# Patient Record
Sex: MALE | Race: WHITE | Employment: FULL TIME | URBAN - METROPOLITAN AREA
[De-identification: names, ages, dates, MRNs, and addresses within clinical notes are randomized per-mention and may not be internally consistent; named-entity substitution may affect disease eponyms.]

---

## 2017-02-09 ENCOUNTER — TRANSFERRED RECORDS (OUTPATIENT)
Dept: HEALTH INFORMATION MANAGEMENT | Facility: CLINIC | Age: 27
End: 2017-02-09

## 2017-02-13 ENCOUNTER — TRANSFERRED RECORDS (OUTPATIENT)
Dept: HEALTH INFORMATION MANAGEMENT | Facility: CLINIC | Age: 27
End: 2017-02-13

## 2017-05-05 ENCOUNTER — TRANSFERRED RECORDS (OUTPATIENT)
Dept: HEALTH INFORMATION MANAGEMENT | Facility: CLINIC | Age: 27
End: 2017-05-05

## 2017-05-08 ENCOUNTER — THERAPY VISIT (OUTPATIENT)
Dept: PHYSICAL THERAPY | Facility: CLINIC | Age: 27
End: 2017-05-08
Payer: COMMERCIAL

## 2017-05-08 DIAGNOSIS — M54.6 PAIN IN THORACIC SPINE: Primary | ICD-10-CM

## 2017-05-08 DIAGNOSIS — G89.29 CHRONIC BILATERAL LOW BACK PAIN WITHOUT SCIATICA: ICD-10-CM

## 2017-05-08 DIAGNOSIS — M54.50 CHRONIC BILATERAL LOW BACK PAIN WITHOUT SCIATICA: ICD-10-CM

## 2017-05-08 PROCEDURE — 97110 THERAPEUTIC EXERCISES: CPT | Mod: GP | Performed by: PHYSICAL THERAPIST

## 2017-05-08 PROCEDURE — 97161 PT EVAL LOW COMPLEX 20 MIN: CPT | Mod: GP | Performed by: PHYSICAL THERAPIST

## 2017-05-08 NOTE — PROGRESS NOTES
Physical Therapy Initial Evaluation  May 8, 2017     Precautions/Restrictions/MD instructions: PT eval and treat. 2x/wk for 12 weeks, stretching and strengthening, US, ergonomics, back school, send back to physician for follow-up when therapy is completed. When pt done with therapy, fax Oswestry scores for first and last visits.     Subjective:   Date of Onset: Chronic since age 11  Primary Symptoms/Location of Pain: Mid back pain as well as bilateral LBP. Quality of pain is dull, aching and stabbing. Pains are described as constant in nature. Pain is worse: constant. Pain is rated 3/10.   History of symptoms: Pains began gradually as the result of insidious onset when he was a child - possibly diagnosed with scoliosis.   Worsened by: Worse as day goes on, but also first thing in the morning. Bending forward makes it worse. Lifting objects.  Alleviated by: Nothing.    General health as reported by patient: good  Pertinent medical/surgical history: refer to health history. Imaging: MRI/discogram (T6,7,8 DDD, herniated nucleus pulposus). 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 Patient's goals are: decrease pain. Return to MD:  When done with therapy.     Therapist Impression:   Hong Santiago is a 26 year old male with chronic history of back pain. He presents with signs and symptoms consistent with mechanical thoracic and lumbar back pain. These impairments limit his ability to bend forward and lift objects for work. Skilled PT services are necessary in order to reduce impairments and improve independent function.    Objective:  LUMBAR EXAMINATION    Posture: Poor/fair in sitting  Baseline mid and LBP  Active ROM Limitation   Flexion     Juan? Min loss, Incr mid back pain  Positive   Extension Min loss, no effect    L R   Sideglide + on R low back -     Movement Loss Percy Mod Min Nil Pain   Flexion   X  Incr midback pain   Extension  X   Incr midback pain   Rotation R    X -   Rotation L    X -    Other          (* = patient s pain)  Myotomal Strength (MMT) L R   Resisted Hip Flexion (L2) 5 5   Resisted Knee Ext (L3) 5 5   Resisted Ankle DF (L4) 5 5   Resisted Great Toe Ext (L5) 5 5   Resisted Peroneals (S1) 5 5   Resisted Knee Flexion (S1-2) 5 5     Sensory/Reflex Tests: light touch sensation symmetrical for bilateral LEs     Dural Signs:   L R   Slump - -     Assessment/Plan:    The patient is a 26 year old male with chief complaint of mid and low back pain.    The patient has the following significant findings with corresponding treatment plan.  Diagnosis 1:  Thoracic pain, HNP  Pain -  hot/cold therapy, US, electric stimulation, mechanical traction, manual therapy, splint/taping/bracing/orthotics, self management, education, directional preference exercise and home program  Decreased ROM/flexibility - manual therapy, therapeutic exercise and home program  Decreased joint mobility - manual therapy and therapeutic exercise  Decreased strength - therapeutic exercise, therapeutic activities and home program  Impaired muscle performance - neuro re-education  Decreased function - therapeutic activities  Impaired posture - neuro re-education    Diagnosis 2:  Mechanical LBP     Pain -  hot/cold therapy, US, electric stimulation, mechanical traction, manual therapy, splint/taping/bracing/orthotics, self management, education, directional preference exercise and home program  Decreased ROM/flexibility - manual therapy, therapeutic exercise and home program  Decreased joint mobility - manual therapy and therapeutic exercise  Decreased strength - therapeutic exercise, therapeutic activities and home program  Impaired muscle performance - neuro re-education  Decreased function - therapeutic activities  Impaired posture - neuro re-education    Therapy Evaluation Codes:   1) History comprised of:   Personal factors that impact the plan of care:      Overall behavior pattern, Past/current experiences and Time since  onset of symptoms.    Comorbidity factors that impact the plan of care are:      None.     Medications impacting care: Pain.  2) Examination of Body Systems comprised of:   Body structures and functions that impact the plan of care:      Lumbar spine and Thoracic Spine.   Activity limitations that impact the plan of care are:      Bending and lifting.   Clinical presentation characteristics are:    Stable/Uncomplicated.  3) Presentation comprised of:   Presentation scored as Low complexity with uncomplicated characteristics..  4) Decision-Making    Low complexity using standardized patient assessment instrument and/or measureable assessment of functional outcome.  Cumulative Therapy Evaluation is: Low complexity.    Previous and current functional limitations:  (See Goal Flow Sheet for this information)    Short term and Long term goals: (See Goal Flow Sheet for this information)     Communication ability:  Patient appears to be able to clearly communicate and understand verbal and written communication and follow directions correctly.  Treatment Explanation - The following has been discussed with the patient: RX ordered/plan of care, anticipated outcomes, and possible risks and side effects.  This patient would benefit from PT intervention to resume normal activities.   Rehab potential is poor given comorbidities listed above as well as failure to improve with prior treatment and chronicity of symptoms.    Frequency:  2 X week, once daily  Duration:  for 3 weeks  Discharge Plan: Achieve all LTGs, be independent in home treatment program, and reach maximal therapeutic benefit.    Please refer to the daily flowsheet for treatment today, total treatment time and time spent performing 1:1 timed codes.

## 2017-05-08 NOTE — PROGRESS NOTES
Subjective:    Patient is a 26 year old male presenting with rehab left ankle/foot hpi.                                      Pertinent medical history includes:  Depression.    Other surgeries include:  Orthopedic surgery (R foot).  Current medications:  Sleep medication, pain medication and anti-depressants.  Current occupation is [employer].    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                                Objective:    System    Physical Exam    General     ROS    Assessment/Plan:

## 2017-05-08 NOTE — LETTER
[facility] PHYSICAL THERAPY  [address]  [place] [address]  [phone]    May 8, 2017  Re: Hong DIOP Pamela   :   1990  MRN:  [id number]   REFERRING PHYSICIAN:   Bobo Lopez    [facility] PHYSICAL [facility]  Date of Initial Evaluation:  17  Visits: 1 Rxs Used: 1  Reason for Referral:     Pain in thoracic spine  Chronic bilateral low back pain without sciatica  Physical Therapy Initial Evaluation  May 8, 2017  Precautions/Restrictions/MD instructions: PT eval and treat. 2x/wk for 12 weeks, stretching and strengthening, US, ergonomics, back school, send back to physician for follow-up when therapy is completed. When pt done with therapy, fax Oswestry scores for first and last visits.     Subjective:   Date of Onset: Chronic since age 11  Primary Symptoms/Location of Pain: Mid back pain as well as bilateral LBP. Quality of pain is dull, aching and stabbing. Pains are described as constant in nature. Pain is worse: constant. Pain is rated 3/10.   History of symptoms: Pains began gradually as the result of insidious onset when he was a child - possibly diagnosed with scoliosis.   Worsened by: Worse as day goes on, but also first thing in the morning. Bending forward makes it worse. Lifting objects.  Alleviated by: Nothing.    General health as reported by patient: good  Pertinent medical/surgical history: Depression. Other surgeries include: Orthopedic surgery (R foot). Current medications: Sleep medication, pain medication and anti-depressants. Current occupation is [employer]. 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  Imaging: MRI/discogram (T6,7,8 DDD, herniated nucleus pulposus). 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 Patient's goals are: decrease pain. Return to MD:  When done with therapy.     Re: Hong DIOP  Pamela   :   1990    Therapist Impression:   Hong Santiago is a 26 year old male with chronic history of back pain. He presents with signs and symptoms consistent with mechanical thoracic and lumbar back pain. These impairments limit his ability to bend forward and lift objects for work. Skilled PT services are necessary in order to reduce impairments and improve independent function.    Objective:  LUMBAR EXAMINATION    Posture: Poor/fair in sitting  Baseline mid and LBP  Active ROM Limitation   Flexion     Juan? Min loss, Incr mid back pain  Positive   Extension Min loss, no effect    L R   Sideglide + on R low back -     Movement Loss Percy Mod Min Nil Pain   Flexion   X  Incr midback pain   Extension  X   Incr midback pain   Rotation R    X -   Rotation L    X -   Other          (* = patient s pain)  Myotomal Strength (MMT) L R   Resisted Hip Flexion (L2) 5 5   Resisted Knee Ext (L3) 5 5   Resisted Ankle DF (L4) 5 5   Resisted Great Toe Ext (L5) 5 5   Resisted Peroneals (S1) 5 5   Resisted Knee Flexion (S1-2) 5 5     Sensory/Reflex Tests: light touch sensation symmetrical for bilateral LEs     Dural Signs:   L R   Slump - -     Assessment/Plan:    The patient is a 26 year old male with chief complaint of mid and low back pain.    The patient has the following significant findings with corresponding treatment plan.  Diagnosis 1:  Thoracic pain, HNP    Re: Hong Santiago   :   1990    Pain -  hot/cold therapy, US, electric stimulation, mechanical traction, manual therapy, splint/taping/bracing/orthotics, self management, education, directional preference exercise and home program  Decreased ROM/flexibility - manual therapy, therapeutic exercise and home program  Decreased joint mobility - manual therapy and therapeutic exercise  Decreased strength - therapeutic exercise, therapeutic activities and home program  Impaired muscle performance - neuro re-education  Decreased function - therapeutic  activities  Impaired posture - neuro re-education  Diagnosis 2:  Mechanical LBP     Pain -  hot/cold therapy, US, electric stimulation, mechanical traction, manual therapy, splint/taping/bracing/orthotics, self management, education, directional preference exercise and home program  Decreased ROM/flexibility - manual therapy, therapeutic exercise and home program  Decreased joint mobility - manual therapy and therapeutic exercise  Decreased strength - therapeutic exercise, therapeutic activities and home program  Impaired muscle performance - neuro re-education  Decreased function - therapeutic activities  Impaired posture - neuro re-education    Therapy Evaluation Codes:   1) History comprised of:   Personal factors that impact the plan of care:      Overall behavior pattern, Past/current experiences and Time since onset of symptoms.    Comorbidity factors that impact the plan of care are:      None.     Medications impacting care: Pain.  2) Examination of Body Systems comprised of:   Body structures and functions that impact the plan of care:      Lumbar spine and Thoracic Spine.   Activity limitations that impact the plan of care are:      Bending and lifting.   Clinical presentation characteristics are:    Stable/Uncomplicated.  3) Presentation comprised of:   Presentation scored as Low complexity with uncomplicated characteristics..  4) Decision-Making    Low complexity using standardized patient assessment instrument and/or measureable assessment of functional outcome.  Cumulative Therapy Evaluation is: Low complexity.  Previous and current functional limitations:  (See Goal Flow Sheet for this information)    Short term and Long term goals: (See Goal Flow Sheet for this information)   Communication ability:  Patient appears to be able to clearly communicate and understand verbal and written communication and follow directions correctly.  Treatment Explanation - The following has been discussed with the  patient: RX ordered/plan of care, anticipated outcomes, and possible risks and side effects.  Re: Hong Santiago   :   1990    This patient would benefit from PT intervention to resume normal activities.   Rehab potential is poor given comorbidities listed above as well as failure to improve with prior treatment and chronicity of symptoms.  Frequency:  2 X week, once daily  Duration:  for 3 weeks  Discharge Plan: Achieve all LTGs, be independent in home treatment program, and reach maximal therapeutic benefit.      Thank you for your referral.    INQUIRIES  Therapist: Flakito Loyola DPT  [facility]  [address] [place] [address] [id number]  [facility] [address]  Phone: [phone]  Fax: [phone]

## 2017-05-12 ENCOUNTER — THERAPY VISIT (OUTPATIENT)
Dept: PHYSICAL THERAPY | Facility: CLINIC | Age: 27
End: 2017-05-12
Payer: COMMERCIAL

## 2017-05-12 DIAGNOSIS — M54.50 CHRONIC BILATERAL LOW BACK PAIN WITHOUT SCIATICA: ICD-10-CM

## 2017-05-12 DIAGNOSIS — G89.29 CHRONIC BILATERAL LOW BACK PAIN WITHOUT SCIATICA: ICD-10-CM

## 2017-05-12 DIAGNOSIS — M54.6 PAIN IN THORACIC SPINE: ICD-10-CM

## 2017-05-12 PROCEDURE — 97110 THERAPEUTIC EXERCISES: CPT | Mod: GP | Performed by: PHYSICAL THERAPIST

## 2017-05-12 PROCEDURE — 97112 NEUROMUSCULAR REEDUCATION: CPT | Mod: GP | Performed by: PHYSICAL THERAPIST

## 2017-05-12 PROCEDURE — 97140 MANUAL THERAPY 1/> REGIONS: CPT | Mod: GP | Performed by: PHYSICAL THERAPIST

## 2017-05-16 ENCOUNTER — THERAPY VISIT (OUTPATIENT)
Dept: PHYSICAL THERAPY | Facility: CLINIC | Age: 27
End: 2017-05-16
Payer: COMMERCIAL

## 2017-05-16 DIAGNOSIS — G89.29 CHRONIC BILATERAL LOW BACK PAIN WITHOUT SCIATICA: ICD-10-CM

## 2017-05-16 DIAGNOSIS — M54.50 CHRONIC BILATERAL LOW BACK PAIN WITHOUT SCIATICA: ICD-10-CM

## 2017-05-16 DIAGNOSIS — M54.6 PAIN IN THORACIC SPINE: ICD-10-CM

## 2017-05-16 PROCEDURE — 97530 THERAPEUTIC ACTIVITIES: CPT | Mod: GP | Performed by: PHYSICAL THERAPIST

## 2017-05-16 PROCEDURE — 97140 MANUAL THERAPY 1/> REGIONS: CPT | Mod: GP | Performed by: PHYSICAL THERAPIST

## 2017-05-24 ENCOUNTER — TELEPHONE (OUTPATIENT)
Dept: PALLIATIVE MEDICINE | Facility: CLINIC | Age: 27
End: 2017-05-24

## 2017-05-24 ENCOUNTER — OFFICE VISIT (OUTPATIENT)
Dept: NEUROSURGERY | Facility: CLINIC | Age: 27
End: 2017-05-24
Attending: NURSE PRACTITIONER
Payer: COMMERCIAL

## 2017-05-24 VITALS
HEART RATE: 97 BPM | BODY MASS INDEX: 24.62 KG/M2 | TEMPERATURE: 97.6 F | DIASTOLIC BLOOD PRESSURE: 73 MMHG | OXYGEN SATURATION: 97 % | SYSTOLIC BLOOD PRESSURE: 136 MMHG | WEIGHT: 186.6 LBS

## 2017-05-24 DIAGNOSIS — M54.9 SPINE PAIN, MULTILEVEL: Primary | ICD-10-CM

## 2017-05-24 PROCEDURE — 99205 OFFICE O/P NEW HI 60 MIN: CPT | Performed by: NURSE PRACTITIONER

## 2017-05-24 PROCEDURE — 99211 OFF/OP EST MAY X REQ PHY/QHP: CPT | Performed by: NURSE PRACTITIONER

## 2017-05-24 RX ORDER — DIAZEPAM 5 MG
5 TABLET ORAL EVERY 12 HOURS PRN
Qty: 40 TABLET | Refills: 0 | Status: SHIPPED | OUTPATIENT
Start: 2017-05-24 | End: 2018-04-23

## 2017-05-24 NOTE — PATIENT INSTRUCTIONS
1. Recommend 2nd opinions.  Possible options:    - Dr. Prince Pineda at [facility]    - Dr. Gracia at the [facility]    Both neurosurgeons.       2.  Pain clinic referral. Someone will contact to schedule    3. No surgery indicated at this time per Dr. Nito Esquivel

## 2017-05-24 NOTE — LETTER
May 24, 2017    Hong Santiago  [address] [address]    Dear Hong                                                                 Welcome to the [facility] at the [facility]. We are located on the 6th floor, [address], of the [facility] located at [address] [place] [address]. For general parking, the [address] is the closest to our building.     Your appointment at the [facility] has been scheduled on June 26th at 3:00pm with Laura Thurman NP.    At your first visit, you will meet your team of caregivers who will help you to develop pain management strategies that will last a lifetime. You will meet with our support staff to validate parking at a reduced rate, review your insurance information, and collect your co-payment if required by your insurance company. You will also meet with a medical pain specialist and care coordinator who will assess your pain and develop a plan of care for your successful pain rehabilitation. You should expect to spend 1-2 hours at your first visit with us. Usually, patients work with us for a period of 6-12 months, and eventually return to their primary doctor once their pain management has stabilized.      To help us make your visit go as smoothly as possible, please bring the following items with you on your visit:   Completed Pain Questionnaire enclosed in this packet.  If you do not bring the completed questionnaire, we may have to reschedule your appointment.  List of any medicines that you are currently taking or have been prescribed  Important NON-[place] medical information such as medical records or tests results (X-rays, or laboratory tests)  Your health insurance card  Financial resources to cover your co-payment or balance due at the time of service (cash, personal check, Visa, and MasterCard are acceptable methods of payment)      Due to the demand for new patient evaluations, you must notify the scheduling department 48 hours in advance if you are not able to keep this appointment.  Failure to do so could affect your ability to reschedule with our clinic. Please do not assume that you will  receive any prescription medications at your first visit.    Please call [phone] with any questions regarding your appointment.  We look forward to meeting you and working to address your health care needs.       Sincerely,    [facility]

## 2017-05-24 NOTE — PROGRESS NOTES
Dr. Nito Esquivel  [facility]  Neurosurgery Clinic Visit        CC: spine pain     Primary care Provider: Yasmine Bonilla      Reason For Visit:   I was asked by Dr. Bonilla to consult on the patient for spine pain.      HPI: Hong Santiago is a 26 year old male with spine pain.  He states that he has been treated at the [facility] pain clinic in the past for his pain. He also has had a discogram. He states that his pain began about 16 years ago in his SI joint area.  He states that it started in the pelvic area.  He states that the pain progressed up to his lumbar, thoracic and neck pain.  He states that he has cervical and lumbar radicular pain. He has seen Dr. Falk who was recommend a SI joint fusion and OLIF procedure. He was told that he would walk the next day and feel so much better.  He has had multiple injections.  He states that he is unable to sleep. 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      Pain at its worst 10  Pain right now:  8    Past Medical History:   Diagnosis Date     Anxiety        Past Medical History reviewed with patient during visit.    Past Surgical History:   Procedure Laterality Date     ORTHOPEDIC SURGERY       Past Surgical History reviewed with patient during visit.    Current Outpatient Prescriptions   Medication     Amphetamine-Dextroamphetamine (ADDERALL PO)     Sertraline HCl (ZOLOFT PO)     cloNIDine (CATAPRES) 0.1 MG tablet     HYDROcodone-acetaminophen 5-325 MG per tablet     CLONAZEPAM PO     No current facility-administered medications for this visit.        No Known Allergies    Social History     Social History     Marital status: Single     Spouse name: N/A     Number of children: N/A     Years of education: N/A     Social History Main Topics     Smoking status: Not on file     Smokeless tobacco: Not on file     Alcohol use Yes     Drug use: No     Sexual activity: Not on file     Other Topics Concern     Not on file     Social  History Narrative     No narrative on file       No family history on file.      Review Of Systems  Skin: negative  Eyes: negative  Ears/Nose/Throat: negative  Respiratory: No shortness of breath, dyspnea on exertion, cough, or hemoptysis  Cardiovascular: negative  Gastrointestinal: negative  Genitourinary: negative  Musculoskeletal: back pain and neck pain  Neurologic: negative  Psychiatric: negative  Hematologic/Lymphatic/Immunologic: negative  Endocrine: negative     ROS: 10 point ROS neg other than the symptoms noted above in the HPI.      Vital Signs: /73 (BP Location: Right arm, Patient Position: Chair, Cuff Size: Adult Large)  Pulse 97  Temp 97.6  F (36.4  C) (Oral)  Wt 186 lb 9.6 oz (84.6 kg)  SpO2 97%  BMI 24.62 kg/m2    Examination:  Constitutional:  Alert, well nourished, NAD.  HEENT: Normocephalic, atraumatic.   Pulm:  Without shortness of breath   CV:  No pitting edema of BLE.    Neurological:  Awake  Alert  Oriented x 3  Speech clear  Cranial nerves II - XII intact  PERRL  EOMI  Face symmetric  Tongue midline  Motor exam   Shoulder Abduction:  Right:  5/5   Left:  5/5  Biceps:                      Right:  5/5   Left:  5/5  Triceps:                     Right:  5/5   Left:  5/5  Wrist Extensors:       Right:  5/5   Left:  5/5  Wrist Flexors:           Right:  5/5   Left:  5/5  Intrinsics:                   Right:  5/5   Left:  5/5   Hip Flexor:                Right: 5/5  Left:  5/5  Hip Adductor:             Right:  5/5  Left:  5/5  Hip Abductor:             Right:  5/5  Left:  5/5  Gastroc Soleus:        Right:  5/5  Left:  5/5  Tib/Ant:                      Right:  5/5  Left:  5/5  EHL:                          Right:  5/5  Left:  5/5   Sensation normal to bilateral upper and lower extremities    Gait: Able to stand from a seated position. Normal non-antalgic, non-myelopathic gait.  Able to heel/toe walk without loss of balance      Lumbar examination reveals tenderness of the spine and  paraspinous muscles. Restricted and painful ROM.   Hip height is symmetrical. Negative SI joint pain with palpation today, sciatic notch or greater trochanteric tenderness to palpation bilaterally.  Straight leg raise is negative bilaterally.      Imaging:   Thoracic discogram  medical imaging 2-:    1. Concordance for symptoms of time of injection is suggested.  2.  Dorsal central full thickness annular tear with contrast and gas/air extravasation at T6-7  3.  Right, prior median, posterior, radial tear with full thickness dorsal annular tear T6-7.  4.  Dorsolateral radial tears with small, right central, annular tear at T10-11.  5. Small multilevel Schmorl nodes.  6.  Thoracic scoliosis convex right.   7. No change given technical differences.    MRI lumbar spine 7-:    Mild thoracolumbar Scheuermann's like changes with a mild lumbar curve to the left and:    1.  Mild L1-L2 disc degeneration.  2.  No disc herniation or facet arthropathy, and no stenosis or neural impingement.   3.  No distal cord lesion, and no neoplasm, fracture or infection.     MRI thoracic spine 7-:      1. Mild to moderate multilevel thoracic disc degeneration with mild thoracic scoliosis.    2.  2-3 mm right posterolateral disc herniation at T7-8 without cord impingement.  3.  2-3 mm right paracentral disc protrusion at T6-7 with right ventral cord abutment.   4. No cord abnormality, and no neoplasm, fracture or infection.      Assessment/Plan:     Hong Santiago is a 26 year old male with spine pain.  He states that he has been treated at the [facility] pain clinic in the past for his pain. He also has had a discogram. He states that his pain began about 16 years ago in his SI joint area.  He states that it started in the pelvic area.  He states that the pain progressed up to his lumbar, thoracic and neck pain.  He states that he has cervical and lumbar radicular pain. He has seen Dr. Falk who was recommend a SI joint  fusion and OLIF procedure to his thoracic spine. He was told that he would walk the next day and feel so much better.  He has had multiple injections.  He states that he is unable to sleep. 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  The pt is here with his parents. He is very tearful during the visit. The pt was also seen by Dr. Nito Esquivel.  His scans including the discogram were reviewed in detail. It was explained to the pt that we would not recommend any surgery at this time. The pt and his parents were very upset and feel that nothing is helping. It was explained that he may want to second other opinions before proceeding with any type of surgery. We will also refer him to the pain clinic for comprehensive care. They agreed to the plan of care.  Greater than 50% of this 60 minute visit was spent in face to face evaluation with the patient and family.      Patient Instructions   1. Recommend 2nd opinions.  Possible options:    - Dr. Prince Pineda at [facility]    - Dr. Gracia at the  of     Both neurosurgeons.       2.  Pain clinic referral. Someone will contact to schedule    3. No surgery indicated at this time per Dr. Nito Gustafson [facility]  [facility]  [address] [address]    Tel [phone]  Pager [phone]  1.

## 2017-05-24 NOTE — TELEPHONE ENCOUNTER
[facility] Referral      1. Confirmed address with patient? Yes  2. Confirmed phone number with patient? Yes  3. Confirmed referring provider? Yes  4. Is the PCP the same as the referring provider? No  5. Has the patient been to any previous pain clinics? Yes [facility] and [facility]   (If yes, send GUCCI with welcome letter)  6. Which insurance are we to bill for this appointment?  [employer]    7. Informed pt of cancellation (48 hour) policy? Yes    REGARDING OPIOID MEDICATIONS: We will always address appropriateness of opioid pain medications, but we generally will not automatically take on a prescribing role. When we do take on prescribing of opioids for chronic pain, it is in collaboration with the referring physician for an intermediate period of time (months), with an expectation that the primary physician or provider will assume the prescribing role if medications are effective at stable doses with demonstrated compliance. Therefore, please do not assume that your prescribing responsibilities end on the day of pain clinic consultation.  7. Informed pt of prescribing policy? Yes      8. Referring Provider: Yasmine Bonilla

## 2017-05-24 NOTE — NURSING NOTE
"Hong Santiago is a 26 year old male who presents for:  Chief Complaint   Patient presents with     Neurologic Problem     New pt second opinion. Referred by [facility];thoracic and cervical pain>8        Initial Vitals:  /73 (BP Location: Right arm, Patient Position: Chair, Cuff Size: Adult Large)  Pulse 97  Temp 97.6  F (36.4  C) (Oral)  Wt 186 lb 9.6 oz (84.6 kg)  SpO2 97%  BMI 24.62 kg/m2 Estimated body mass index is 24.62 kg/(m^2) as calculated from the following:    Height as of 6/24/12: 6' 1\" (1.854 m).    Weight as of this encounter: 186 lb 9.6 oz (84.6 kg).. Body surface area is 2.09 meters squared. BP completed using cuff size: large  Data Unavailable    Do you feel safe in your environment?  Yes  Do you need any refills today? No    Nursing Comments: New pt second opinion. Referred by [facility];thoracic and cervical pain.  Patient rates 8 pain today as 5/24/17      5 min. nursing intake time  Aby Diamond CMA      Discharge plan: See NP dictation  2 min. nursing discharge time  Aby Diamond CMA         "

## 2017-05-24 NOTE — MR AVS SNAPSHOT
After Visit Summary   5/24/2017    Hong Santiago    MRN: [id number]           Patient Information     Date Of Birth          1990        Visit Information        Provider Department      5/24/2017 1:40 PM Vy Gustafson APRN CNP [facility]        Today's Diagnoses     Spine pain, multilevel    -  1      Care Instructions    1. Recommend 2nd opinions.  Possible options:    - Dr. Prince Pineda at [facility]    - Dr. Gracia at the [facility]    Both neurosurgeons.       2.  Pain clinic referral. Someone will contact to schedule    3. No surgery indicated at this time per Dr. Nito Esquivel           Follow-ups after your visit        Additional Services     PAIN MANAGEMENT CENTER ([place]) REFERRAL       Your provider has referred you to the [facility].    Reason for Referral: Comprehensive Evaluation and Management    Please complete the following questions:    What is your diagnosis for the patient's pain? Spine pain     Do you have any specific questions for the pain specialist? No    Are there any red flags that may impact the assessment or management of the patient? None    **ANY DIAGNOSTIC TESTS THAT ARE NOT IN EPIC SHOULD BE SENT TO THE PAIN CENTER**    Please note the Pre-Op Pain Consult must be scheduled 2-3 weeks prior to the patient's surgery.  Patient's trying to schedule within 2 weeks of surgery may not be accommodated.     Pre-Op Pain Consults are only good for 30 days.    REGARDING OPIOID MEDICATIONS:  We will always address appropriateness of opioid pain medications, but we generally will not automatically take on a prescribing role. When we do take on prescribing of opioids for chronic pain, it is in collaboration with the referring physician for an intermediate period of time (months), with an expectation that the primary physician or provider will assume the prescribing role if medications are effective at stable doses with  "demonstrated compliance.  Therefore, please do not assume that your prescribing responsibilities end on the day of pain clinic consultation.  Is this agreeable to you? YES    For any questions, contact the [facility] at [phone].    Please be aware that coverage of these services is subject to the terms and limitations of your health insurance plan.  Call member services at your health plan with any benefit or coverage questions.      Please bring the following with you to your appointment:    (1) Any X-Rays, CTs or MRIs which have been performed.  Contact the facility where they were done to arrange for  prior to your scheduled appointment.    (2) List of current medications   (3) This referral request   (4) Any documents/labs given to you for this referral                  Who to contact     If you have questions or need follow up information about today's clinic visit or your schedule please contact [facility] directly at [phone].  Normal or non-critical lab and imaging results will be communicated to you by MyChart, letter or phone within 4 business days after the clinic has received the results. If you do not hear from us within 7 days, please contact the clinic through [employer] or phone. If you have a critical or abnormal lab result, we will notify you by phone as soon as possible.  Submit refill requests through [employer] or call your pharmacy and they will forward the refill request to us. Please allow 3 business days for your refill to be completed.          Additional Information About Your Visit        [employer] Information     [employer] lets you send messages to your doctor, view your test results, renew your prescriptions, schedule appointments and more. To sign up, go to [email or web address] . Click on \"Log in\" on the left side of the screen, which will take you to the Welcome page. Then click on \"Sign up Now\" on the right side of the page. "     You will be asked to enter the access code listed below, as well as some personal information. Please follow the directions to create your username and password.     Your access code is: [email or web address]  Expires: 2017  2:58 PM     Your access code will  in 90 days. If you need help or a new code, please call your [facility] clinic or [phone].        Care EveryWhere ID     This is your Care EveryWhere ID. This could be used by other organizations to access your [facility] medical records  [id number]        Your Vitals Were     Pulse Temperature Pulse Oximetry BMI (Body Mass Index)          97 97.6  F (36.4  C) (Oral) 97% 24.62 kg/m2         Blood Pressure from Last 3 Encounters:   17 136/73   06/10/13 100/53   12 120/80    Weight from Last 3 Encounters:   17 186 lb 9.6 oz (84.6 kg)   06/10/13 190 lb (86.2 kg)   12 195 lb (88.5 kg)              We Performed the Following     PAIN MANAGEMENT CENTER ([place]) REFERRAL        Primary Care Provider Office Phone # Fax #    Yasmine Bonilla [phone]       [facility] [id number] [place] [id number]        Thank you!     Thank you for choosing [facility]  for your care. Our goal is always to provide you with excellent care. Hearing back from our patients is one way we can continue to improve our services. Please take a few minutes to complete the written survey that you may receive in the mail after your visit with us. Thank you!             Your Updated Medication List - Protect others around you: Learn how to safely use, store and throw away your medicines at www.disposemymeds.org.          This list is accurate as of: 17  2:58 PM.  Always use your most recent med list.                   Brand Name Dispense Instructions for use    ADDERALL PO      Take 20 mg by mouth 2 times daily.       CLONAZEPAM PO      Take 0.5 mg by mouth 3 times daily.       cloNIDine 0.1 MG  tablet    CATAPRES    15 tablet    Take 1 tablet by mouth 3 times daily as needed. Opiate withdrawal       HYDROcodone-acetaminophen 5-325 MG per tablet    NORCO    15 tablet    Take 1-2 tablets by mouth every 4 hours as needed for pain.       ZOLOFT PO      Take 100 mg by mouth daily.

## 2017-06-26 ENCOUNTER — OFFICE VISIT (OUTPATIENT)
Dept: PALLIATIVE MEDICINE | Facility: CLINIC | Age: 27
End: 2017-06-26
Payer: COMMERCIAL

## 2017-06-26 VITALS
BODY MASS INDEX: 25.07 KG/M2 | SYSTOLIC BLOOD PRESSURE: 122 MMHG | DIASTOLIC BLOOD PRESSURE: 77 MMHG | HEART RATE: 85 BPM | WEIGHT: 190 LBS

## 2017-06-26 DIAGNOSIS — G89.29 CHRONIC BILATERAL LOW BACK PAIN WITHOUT SCIATICA: ICD-10-CM

## 2017-06-26 DIAGNOSIS — M54.50 CHRONIC BILATERAL LOW BACK PAIN WITHOUT SCIATICA: ICD-10-CM

## 2017-06-26 DIAGNOSIS — M54.6 PAIN IN THORACIC SPINE: Primary | ICD-10-CM

## 2017-06-26 PROCEDURE — 99244 OFF/OP CNSLTJ NEW/EST MOD 40: CPT | Performed by: NURSE PRACTITIONER

## 2017-06-26 ASSESSMENT — PAIN SCALES - GENERAL: PAINLEVEL: MODERATE PAIN (5)

## 2017-06-26 NOTE — MR AVS SNAPSHOT
After Visit Summary   6/26/2017    oHng Santiago    MRN: [id number]           Patient Information     Date Of Birth          1990        Visit Information        Provider Department      6/26/2017 3:00 PM Laura Thurman APRN CNP [facility]        Care Instructions    After Visit Instructions:     Thank you for coming to [facility] for your care. It is my goal to partner with you to help you reach your optimal state of health.     I am recommending multidisciplinary care at this time.  The focus of care will be to continue gradual rehabilitation and pain management with medication adjustments as needed.    Continue daily self-care, identifying contributing factors, and monitoring variations in pain level. Continue to integrate self-care into your life.      1. Schedule follow-up with MOISES [place], NPELDA in 2-3 weeks to discuss treatment options. Schedule 60 minutes    MOISES Davenport, NP-C  [facility]    Contact information: [facility]    Please call if any side effects, questions, or concerns arise.    Nurse Triage line:  [phone]   Call this number with any questions or concerns. You may leave a detailed message anytime. Calls are typically returned Monday through Friday between 8 AM and 4:30 PM. We usually get back to you within 2 business days depending on the issue/request.    Scheduling number: [phone].  Call this number to schedule or change appointments.          Medication Refills Policy:    For non-narcotic medications, please your pharmacy directly to request a refill and the pharmacy will call the [facility] for authorization. Please allow 3-4 days for these refills.    For narcotic refills, call the nurse triage line and leave a message requesting your refill along with the name of the pharmacy that you use. Narcotic prescriptions will be  mailed to your pharmacy or you may pick them up at the clinic.  Please call 7-10 days before your refill is due  The above policy allows adequate time so that you do not run out of medication.    No Show - Late Cancellation - Late Arrival Policy  We believe regular attendance is key to your success in our program.    Any time you are unable to keep your appointment we ask that you call us at  least 24 hours in advance to let us know. This will allow us to offer the appointment time to another patient. The following is our policy for missed appointments. This also applies to appointments cancelled with less than 24 hours notice.    You will receive a letter after missing your 1st and 2nd appointments without contacting the clinic before your scheduled appointment time.     After missing 3 appointments without calling first, we will cancel all of your future appointments at [facility].    At that point, you will not be able to resume services unless approved by your care team  We understand that unforseen circumstances arise, however, out of respect for all concerned and to provide this appointment to another patient, this policy will be enforced.    Please note that most follow up appointments are 30 minutes long. If you arrive late, your provider may not be able to see you for the entire 30 minutes. Please also note that if you arrive more than 15 minutes for any appointment, it may be rescheduled.                                   Follow-ups after your visit        Who to contact     If you have questions or need follow up information about today's clinic visit or your schedule please contact [facility] directly at [phone].  Normal or non-critical lab and imaging results will be communicated to you by MyChart, letter or phone within 4 business days after the clinic has received the results. If you do not hear from us within 7 days, please contact the clinic through  "[employer] or phone. If you have a critical or abnormal lab result, we will notify you by phone as soon as possible.  Submit refill requests through [employer] or call your pharmacy and they will forward the refill request to us. Please allow 3 business days for your refill to be completed.          Additional Information About Your Visit        [employer] Information     [employer] lets you send messages to your doctor, view your test results, renew your prescriptions, schedule appointments and more. To sign up, go to [email or web address] . Click on \"Log in\" on the left side of the screen, which will take you to the Welcome page. Then click on \"Sign up Now\" on the right side of the page.     You will be asked to enter the access code listed below, as well as some personal information. Please follow the directions to create your username and password.     Your access code is: [email or web address]  Expires: 2017  2:58 PM     Your access code will  in 90 days. If you need help or a new code, please call your [facility] clinic or [phone].        Care EveryWhere ID     This is your Care EveryWhere ID. This could be used by other organizations to access your [place] [facility] medical records  [id number]        Your Vitals Were     Pulse BMI (Body Mass Index)                85 25.07 kg/m2           Blood Pressure from Last 3 Encounters:   17 122/77   17 136/73   06/10/13 100/53    Weight from Last 3 Encounters:   17 86.2 kg (190 lb)   17 84.6 kg (186 lb 9.6 oz)   06/10/13 86.2 kg (190 lb)              Today, you had the following     No orders found for display       Primary Care Provider Office Phone # Fax #    Yasmine Bonilla [phone]       [place] [address] [facility] [address]        Equal Access to Services     [facility] DELTA : Daniel Owens, ramone bowman, neo ortega . So [facility] " [phone].    ATENCIÓN: Si ramirez braden, tiene a santiago disposición servicios gratuitos de asistencia lingüística. Cris lewis [phone].    We comply with applicable federal civil rights laws and [place] laws. We do not discriminate on the basis of race, color, national origin, age, disability sex, sexual orientation or gender identity.            Thank you!     Thank you for choosing [facility]  for your care. Our goal is always to provide you with excellent care. Hearing back from our patients is one way we can continue to improve our services. Please take a few minutes to complete the written survey that you may receive in the mail after your visit with us. Thank you!             Your Updated Medication List - Protect others around you: Learn how to safely use, store and throw away your medicines at www.disposemymeds.org.          This list is accurate as of: 6/26/17  4:06 PM.  Always use your most recent med list.                   Brand Name Dispense Instructions for use Diagnosis    ADDERALL PO      Take 20 mg by mouth 2 times daily.        CLONAZEPAM PO      Take 0.5 mg by mouth 3 times daily.        diazepam 5 MG tablet    VALIUM    40 tablet    Take 1 tablet (5 mg) by mouth every 12 hours as needed for muscle spasms or pain    Spine pain, multilevel       ZOLOFT PO      Take 100 mg by mouth daily.

## 2017-06-26 NOTE — PATIENT INSTRUCTIONS
After Visit Instructions:     Thank you for coming to [facility] for your care. It is my goal to partner with you to help you reach your optimal state of health.     I am recommending multidisciplinary care at this time.  The focus of care will be to continue gradual rehabilitation and pain management with medication adjustments as needed.    Continue daily self-care, identifying contributing factors, and monitoring variations in pain level. Continue to integrate self-care into your life.      1. Schedule follow-up with MOISES [facility] NP-C in 2-3 weeks to discuss treatment options. Schedule 60 minutes    MOISES Davenport, NP-C  [facility]  [facility]    Contact information: [facility]    Please call if any side effects, questions, or concerns arise.    Nurse Triage line:  [phone]   Call this number with any questions or concerns. You may leave a detailed message anytime. Calls are typically returned Monday through Friday between 8 AM and 4:30 PM. We usually get back to you within 2 business days depending on the issue/request.    Scheduling number: [phone].  Call this number to schedule or change appointments.          Medication Refills Policy:    For non-narcotic medications, please your pharmacy directly to request a refill and the pharmacy will call the Pain Management Center for authorization. Please allow 3-4 days for these refills.    For narcotic refills, call the nurse triage line and leave a message requesting your refill along with the name of the pharmacy that you use. Narcotic prescriptions will be mailed to your pharmacy or you may pick them up at the clinic.  Please call 7-10 days before your refill is due  The above policy allows adequate time so that you do not run out of medication.    No Show - Late Cancellation - Late Arrival Policy  We believe regular attendance is key to your success in our program.     Any time you are unable to keep your appointment we ask that you call us at  least 24 hours in advance to let us know. This will allow us to offer the appointment time to another patient. The following is our policy for missed appointments. This also applies to appointments cancelled with less than 24 hours notice.    You will receive a letter after missing your 1st and 2nd appointments without contacting the clinic before your scheduled appointment time.     After missing 3 appointments without calling first, we will cancel all of your future appointments at [facility].    At that point, you will not be able to resume services unless approved by your care team  We understand that unforseen circumstances arise, however, out of respect for all concerned and to provide this appointment to another patient, this policy will be enforced.    Please note that most follow up appointments are 30 minutes long. If you arrive late, your provider may not be able to see you for the entire 30 minutes. Please also note that if you arrive more than 15 minutes for any appointment, it may be rescheduled.

## 2017-06-26 NOTE — NURSING NOTE
"No chief complaint on file.      Initial /77  Pulse 85  Wt 86.2 kg (190 lb)  BMI 25.07 kg/m2 Estimated body mass index is 25.07 kg/(m^2) as calculated from the following:    Height as of 6/24/12: 1.854 m (6' 1\").    Weight as of this encounter: 86.2 kg (190 lb).  Medication Reconciliation: complete    "

## 2017-06-26 NOTE — PROGRESS NOTES
"Hong Santiago is a 26 year old male     This patient is being seen at the request of his [facility] medical providers Dr. Esquivel and Vy Gustafson NP-C, for evaluation of his pain issues and recommendations for management, with specific emphasis on back and neck pain    Primary Care Provider is Yasmine Bonilla    The current pain medications are being prescribed by multiple providers    Please see the [facility] health questionnaire which the patient completed and reviewed with me in detail    CHIEF COMPLAINT:  \"To get relief from chronic pain\"    HISTORY OF PRESENT ILLNESS:  Hong Santiago is a 26 year old male with history of low back pain and upper back pain , SI joints, hips    Pain Information:   Onset/Progression:  Pain started at age 10. No specific injury   Pain quality: Aching and dull     Pain timing: Constant     Pain rating: intensity ranges from 4/10 to 8/10, and averages 6/10 on a 0-10 scale.   Aggravating factors include: Standing too long in one place   Relieving factors include: Hot water and using inversion table    Past Pain Treatments:    Pain Clinic:   Yes: [facility] in [place]: had consultation, \"felt unprofessional\".  [facility]: Good care,saw Vy Pruitt: Was discharged for marijuana use   PT: Yes 12-15 different places, [place]   Psychologist: No   Relaxation techniques/biofeedback: No   Chiropractor: Yes: Short term help for neck and low back   Acupuncture: No   Pharmacotherapy:     Opioids: Yes      Non-opioids:  Yes    TENs Unit: Yes: [facility]   Injections: Yes: three SI joint injections, H short term,    Self-care:   No     Current Pain Relevant Medications:    Tylenol #3, 6 tabs per day  Clonazepam 2 mg BID  Valium 10 mg at HS  Adderall 20 mg daily  Sertraline 50 mg daily    Previous Pain Relevant Medications: (H--helped; HI--Helped initially; [facility]--Somewhat helpful; [facility]--No help; W--worse; SE--side effects; ?--Unsure if helpful)   NOTE: This medication information taken " from patient's intake form, not medical records.    Opiates: Codeine: NH, Morphine: SE fatigue, Tramadol: NH, Buprenorphine:NH   NSAIDS: Ibuprofen:Nh, Naproxen:NH,     Muscle Relaxants: SOMA:H for sleep, Clonazepam: [facility], Cyclobenzaprine: NH, Methocarbamol: NH   Anti-migraine mediations: none   Anti-depressants: Buprenorphine: NH, Citalopram: NH, Prozac: Reduced libido, Paxil: SE< Trazodone: Bad dreams, dry mouth, Sertraline: Currently taking.    Sleep aids:Xanax:J for sleep. Clonazepam: H for anxiety, Diazepam:H for sleep    Anxiolytics: Xanax: H, Clonazepam: H, currently taking, Diazepam:H   Anti-convulsants: Gabapentin: tired, sedation    Topicals: Lidocaine: NH   Other medications not covered above: Tylenol:NH, Medical Cannabis: certified by [facility]. [facility]    Any illicit drug use: None  EtOH use: twice monthly few drinks,   Caffeine use: one daily  Nicotine use: daily, e-cig  Any use of prescriptions other than how they were prescribed: notes taking extra pain pills at times when pain is not controlled.   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  Current stressors: pain  Safety concerns: denies    FAMILY MEDICAL HISTORY:  Chronic pain: No   Family history of headaches:  No      HEALTH & LIFESTYLE PRACTICES:  Social History     Social History     Marital status: Single     Spouse name: N/A     Number of children: N/A     Years of education: N/A     Social History Main Topics     Smoking status: Not on file     Smokeless tobacco: Not on file     Alcohol use Yes     Drug use: No     Sexual activity: Not on file     Other Topics Concern     Not on file     Social History Narrative     No narrative on file  "      ALLERGIES:  No Known Allergies    MEDICATIONS:  Current Outpatient Prescriptions   Medication Sig Dispense Refill     diazepam (VALIUM) 5 MG tablet Take 1 tablet (5 mg) by mouth every 12 hours as needed for muscle spasms or pain 40 tablet 0     Amphetamine-Dextroamphetamine (ADDERALL PO) Take 20 mg by mouth 2 times daily.       Sertraline HCl (ZOLOFT PO) Take 100 mg by mouth daily.       CLONAZEPAM PO Take 0.5 mg by mouth 3 times daily.       PHQ-9: Patient Score: 5, \"Extremely difficult\"  Oswestry Disability Index: Patient score: 36%    REVIEW OF SYSTEMS:   Constitutional:  Fatigue and Weight Loss  Eyes/Head: Headache from neck pain  Ears/Nose/Throat: Negative  Allergy/Immune: Negative  Skin:Negative  Hematologic/Lymphatic/Immunologic:Negative  Respiratory: Negative  Cardiovascular: Chest pain r/t stress  Gastrointestinal: Abdominal pain, wrapping from back  Endocrine: Negative  Musculoskeletal: Back pain, Neck pain and Stiffness  Urinary:  Negative   Any bowel or bladder incontinence: Denies   Neurologic: Numbness/Tingling: in feet and hands  Psychiatric: Anxiety, Depression, Mood swings and Stress    PHYSICAL EXAM    /77  Pulse 85  Wt 86.2 kg (190 lb)  BMI 25.07 kg/m2     Appearance:     A&O. Patient is appropriate.   Patient is in NAD.   Patient is well groomed and appears stated age.   Patient is not overweight/underweight.     HEENT:   Normocephalic, atraumatic, sclera, conjunctiva and pharynx normal. Pupils are equal, round. Hearing is adequate for exam .  Neck: Supple.  No deformities or adenopathy  Cardiovascular:  Regular rate and rhythm, no murmur. No edema or JVD appreciated. Peripheral pulses are palpable, no edema on bilateral lower extremities.   Pulmonary:  Clear to auscultation bilaterally. No crackles or wheezing.   Abdominal/Pelvic:   Soft, non-tender with good bowel sounds, no masses felt  Skin:  No rashes, erythema, breakdowns, lesions.   Hematologic:  No bruises, petechiae or " ecchymosis.  Musculoskeletal:  Posture upright, shoulders and pelvis are leveled.   Deltoid: R: 5/5 L: 5/5  Biceps: R: 5/5 L: 5/5  Triceps: R: 5/5 L: 5/5  Intrinsic hand:R: 5/5 L: 5/5  Hip flexion: R: 5/5 L: 4/5  Knee ext: R: 5/5 L: 4/5  Knee flex: R: 5/5 L: 4/5  Dorsiflexion: R: 5/5 L: 4/5  Plantarflexion:R: 5/5 L: 4/5    Gait pattern:  Able to walk on the heels and toes with moderate difficulty. Patient has no antalgic gait.     Neurological:   Deep Tendon Reflex exam:   Biceps:     R:  2/4   L: 2/4   Brachioradialis   R:  2/4   L: 2/4:   Triceps:  R:  2/4   L: 2/4   Patella:  R:  2/4   L: 2/4   Achilles:  R:  2/4   L: 2/4    Saddle anesthesia: Denies    Sensory exam:   Light touch: normal bilateral upper and lower extremities    Vibration: normal in LE   No allodynia, dysesthesia, or hyperalgesia.    Cervical spine:   Flex:  30 degrees   Ext: 30 degrees   Rotation to right: 30 degrees   Rotation to left: 30 degrees   Rotation/ext to right: stiff   Rotation/ext to left: stiff   Tenderness in the cervical spine at midline. No   Tenderness in the cervical paraspinal muscles. No  Thoracic spine:    Kyphosis. Yes   Tenderness in the thoracic spine at midline. No   Tenderness in the thoracic paraspinal muscles. No  Lumbar/Sacral spine:   Forward Flexion:  90 degrees   Ext: 30 degrees, painful   Rotation/ext to right: painful    Rotation/ext to left:: pain free   Lordosis. No   Tenderness in the lumbar spine at midline. No   Tenderness in the lumbar paraspinal muscles.No   Straight leg exam:    Right: negative     Left:  negative   Evangelina/Medardo's test:      Right: negative     Left:  negative   Passive internal rotation:     Right: negative     Left: negative    Active external rotation:      Right: negative     Left: negative   Tenderness over piriformis:     Right: positive     Left:  negative   Tenderness over Trochanteric Bursa:     Right: negative     Left: negative       Psychiatric:  mentation appears normal.,  "affect and mood elevated, rapid speech pattern    DIRE Score for ongoing opioid management is calculated as follows:    Diagnosis = 2 pts (slowly progressive; moderate pain/objective findings)    Intractability = 2 pts (most treatments tried; patient not fully engaged/barriers)    Risk        Psych = 1 pt (serious personality dysfunction/mental illness that significantly interferes with care)         Chem Hlth = 2 pts (use of medications to cope with stress; chemical dependency in remission)       Reliability = 2 pts (occasional difficulties with compliance; generally reliable)       Social = 2 pts (reduction in some relationships/life rolls)       (Psych + Chem hlth + Reliability + Social) = 11    Efficacy = 1 pt (poor function; minimal pain relief despite mod/high med dose)    DIRE Score = 12        7-13: likely NOT suitable candidate for long-term opioid analgesia       14-21: may be a suitable candidate for long-term opioid analgesia      Previous Diagnostic Tests:   Imaging Studies:   See scanned imaging reports.     ASSESSMENT:   1.  Thoracic DDD, MIld scoliosis of mid to lower thoracic spine.   2.  Chemical coping style vs chemical dependency  3.  Chronic low back, indeterminate cause, no imaging available.     Hong presents in the company of his father. He has a long standing history of thoracic and lumbar back pain since age 10. No specific injury or inciting event r/t start of pain. He has been through many evaluations over the years and states that he has been to PT \"12-15 different places over the years\". Opiates are the only treatment which provide relief per his report. He was recently evaluated by Nito Esquivel MD through  Neurosurgery dept who did not recommend a surgical repair. He also saw another surgeon, Dr Falk (?) who was willing to operate on his thoracic spine. He is wondering if he should have a third opinion.     Previously seen at [facility]. He brings in several " office notes and procedure/imaging reports which have all been reviewed. There is no imaging r/t lumbar spine however thoracic discogram notes radial tears at T6-7, T 7-8, and T-10-11 as well as Schmoral's Nodes at T 9-10 and T-11-12.     Pertinent [facility] records scanned into EMR. Noted to self medicate with THC and ETOH in office note dated 6/5/15. Overuse of opiate (OxyContin) noted in office note dated 7/29/15. THC noted in UDS in office note dated 4/4/16. Concern for chemical coping vs chemical dependency. Throughout this office visit he repeatedly states that he needs stronger pain medication.     As patient was a bit late to appt and he had extensive records to review, I will have him return in 2-3 weeks for an additional 60 minute appt to to discuss treatment recommendations.   Recommendations will include:    Increased exercise including waking and swimming as we core strengthening    Ice/heat alternating prn   Health psychology: will refer to Dr Jana Kirkpatrick due to current psychology assessments in our clinic booked out to three months.    Do not recommend opiates for long term chronic pain.    Imaging of lumbar spine unless he has recent imaging elsewhere.      PLAN:    Diagnosis reviewed, treatment option addressed, and risk/benefits discussed.  Self-care instructions given.  I am recommending a multidisciplinary treatment plan to help this patient better manage pain.       1. Schedule follow-up with MOISES Morris, NP-C in 2-3 weeks to discuss treatment options. Schedule 60 minutes    TIME SPENT:   A total of 45  minutes was spent on the patient today, greater than 50% of that time was spent on face to face counseling and care coordination regarding diagnoses and treatment options as mentioned above.    I would like to thank Dr. Esquivel and LOI Acosta for allowing me to participate in the management of this patient.     MOISES Davenport, NP-C  [facility]    Chart  documentation done in part with Dragon Voice recognition Software. Although reviewed after completion, some word and grammatical error may remain.

## 2017-07-07 ASSESSMENT — PATIENT HEALTH QUESTIONNAIRE - PHQ9: SUM OF ALL RESPONSES TO PHQ QUESTIONS 1-9: 9

## 2017-07-19 ENCOUNTER — OFFICE VISIT (OUTPATIENT)
Dept: PALLIATIVE MEDICINE | Facility: CLINIC | Age: 27
End: 2017-07-19
Payer: COMMERCIAL

## 2017-07-19 VITALS — SYSTOLIC BLOOD PRESSURE: 128 MMHG | HEART RATE: 79 BPM | DIASTOLIC BLOOD PRESSURE: 73 MMHG | RESPIRATION RATE: 16 BRPM

## 2017-07-19 DIAGNOSIS — G89.29 CHRONIC BILATERAL LOW BACK PAIN WITHOUT SCIATICA: ICD-10-CM

## 2017-07-19 DIAGNOSIS — M54.50 CHRONIC BILATERAL LOW BACK PAIN WITHOUT SCIATICA: ICD-10-CM

## 2017-07-19 DIAGNOSIS — M54.6 PAIN IN THORACIC SPINE: Primary | ICD-10-CM

## 2017-07-19 PROCEDURE — 99214 OFFICE O/P EST MOD 30 MIN: CPT | Performed by: NURSE PRACTITIONER

## 2017-07-19 ASSESSMENT — PAIN SCALES - GENERAL: PAINLEVEL: SEVERE PAIN (6)

## 2017-07-19 NOTE — MR AVS SNAPSHOT
After Visit Summary   7/19/2017    Hong Santiago    MRN: [id number]           Patient Information     Date Of Birth          1990        Visit Information        Provider Department      7/19/2017 2:00 PM Laura Thurman APRN CNP [facility] [place]        Care Instructions    After Visit Instructions:     Thank you for coming to [facility] for your care. It is my goal to partner with you to help you reach your optimal state of health.     I am recommending multidisciplinary care at this time.  The focus of care will be to continue gradual rehabilitation and pain management with medication adjustments as needed.    Continue daily self-care, identifying contributing factors, and monitoring variations in pain level. Continue to integrate self-care into your life.      1. Talk with your psychiatrist about non-benzodiazapine medications for anxiety ie: Buspar, SSRIs.    2. Schedule appt with Dr Jana Kirkpatrick, health psychologist. Contact info provided.   3. Schedule follow-up with MOISES Morris, NP-C in 1 week after discontinuing Diazepam and clonazepam. We will collect a drug screen at that time.   4. Let us know if you need a referral to the [place] pain program.  5. Medication recommendations:   1. Discontinue meds as noted above with the guidance of your psychiatrist.   2. No change to other medications at this time.       MOISES Davenport, NP-C  [facility]    Contact information: [facility]    Please call if any side effects, questions, or concerns arise.    Nurse Triage line:  [phone]   Call this number with any questions or concerns. You may leave a detailed message anytime. Calls are typically returned Monday through Friday between 8 AM and 4:30 PM. We usually get back to you within 2 business days depending on the issue/request.    Scheduling number: [phone].  Call this  number to schedule or change appointments.          Medication Refills Policy:    For non-narcotic medications, please your pharmacy directly to request a refill and the pharmacy will call the Pain Management Center for authorization. Please allow 3-4 days for these refills.    For narcotic refills, call the nurse triage line and leave a message requesting your refill along with the name of the pharmacy that you use. Narcotic prescriptions will be mailed to your pharmacy or you may pick them up at the clinic.  Please call 7-10 days before your refill is due  The above policy allows adequate time so that you do not run out of medication.    No Show - Late Cancellation - Late Arrival Policy  We believe regular attendance is key to your success in our program.    Any time you are unable to keep your appointment we ask that you call us at  least 24 hours in advance to let us know. This will allow us to offer the appointment time to another patient. The following is our policy for missed appointments. This also applies to appointments cancelled with less than 24 hours notice.    You will receive a letter after missing your 1st and 2nd appointments without contacting the clinic before your scheduled appointment time.     After missing 3 appointments without calling first, we will cancel all of your future appointments at [facility].    At that point, you will not be able to resume services unless approved by your care team  We understand that unforseen circumstances arise, however, out of respect for all concerned and to provide this appointment to another patient, this policy will be enforced.    Please note that most follow up appointments are 30 minutes long. If you arrive late, your provider may not be able to see you for the entire 30 minutes. Please also note that if you arrive more than 15 minutes for any appointment, it may be rescheduled.                                     Follow-ups after  "your visit        Who to contact     If you have questions or need follow up information about today's clinic visit or your schedule please contact [facility] directly at [phone].  Normal or non-critical lab and imaging results will be communicated to you by MyChart, letter or phone within 4 business days after the clinic has received the results. If you do not hear from us within 7 days, please contact the clinic through MyChart or phone. If you have a critical or abnormal lab result, we will notify you by phone as soon as possible.  Submit refill requests through [employer] or call your pharmacy and they will forward the refill request to us. Please allow 3 business days for your refill to be completed.          Additional Information About Your Visit        [employer] Information     [employer] lets you send messages to your doctor, view your test results, renew your prescriptions, schedule appointments and more. To sign up, go to [email or web address] . Click on \"Log in\" on the left side of the screen, which will take you to the Welcome page. Then click on \"Sign up Now\" on the right side of the page.     You will be asked to enter the access code listed below, as well as some personal information. Please follow the directions to create your username and password.     Your access code is: [email or web address]  Expires: 2017  2:58 PM     Your access code will  in 90 days. If you need help or a new code, please call your [place] clinic or [phone].        Care EveryWhere ID     This is your Care EveryWhere ID. This could be used by other organizations to access your [place] medical records  [id number]        Your Vitals Were     Pulse Respirations                79 16           Blood Pressure from Last 3 Encounters:   17 128/73   17 122/77   17 136/73    Weight from Last 3 Encounters:   17 86.2 kg (190 lb)   17 84.6 kg (186 lb 9.6 oz)   06/10/13 86.2 kg " (190 lb)              Today, you had the following     No orders found for display       Primary Care Provider    No Pcp Confirmed       No address on file        Equal Access to Services     RACHAELDEANDRE DELTA : Daniel aad ku hadyesenia Owens, wajanda lujudy, misty kainessada fabiola, neo campuzano laDorabre moses. So [facility] [phone].    ATENCIÓN: Si habla español, tiene a santiago disposición servicios gratuitos de asistencia lingüística. Llame al [phone].    We comply with applicable federal civil rights laws and [place] laws. We do not discriminate on the basis of race, color, national origin, age, disability sex, sexual orientation or gender identity.            Thank you!     Thank you for choosing [facility]  for your care. Our goal is always to provide you with excellent care. Hearing back from our patients is one way we can continue to improve our services. Please take a few minutes to complete the written survey that you may receive in the mail after your visit with us. Thank you!             Your Updated Medication List - Protect others around you: Learn how to safely use, store and throw away your medicines at www.disposemymeds.org.          This list is accurate as of: 7/19/17  2:39 PM.  Always use your most recent med list.                   Brand Name Dispense Instructions for use Diagnosis    ADDERALL PO      Take 20 mg by mouth 2 times daily.        CLONAZEPAM PO      Take 0.5 mg by mouth 3 times daily.        diazepam 5 MG tablet    VALIUM    40 tablet    Take 1 tablet (5 mg) by mouth every 12 hours as needed for muscle spasms or pain    Spine pain, multilevel       ZOLOFT PO      Take 100 mg by mouth daily.

## 2017-07-19 NOTE — PROGRESS NOTES
"[facility]    CHIEF COMPLAINT: Pain in back, neck and SI joint pain     INTERVAL HISTORY:  Last seen on 6/26/17     Recommendations/plan at the last visit included:  1.                  Schedule follow-up with MOISES Morris NP-C in 2-3 weeks to discuss treatment options. Schedule 60 minutes    Since his last visit, Hong Santiago reports:  - He and his family have looked into the 3 week inpatient pain program at [facility] and are interested in applying for admission.     Pain Information:   Pain quality: Miserable    Pain timing: Constant     Pain rating: intensity ranges from 3/10 to 7/10, and averages 5/10 on a 0-10 scale.   Aggravating factors include: \"My job\"   Relieving factors include: Hot showers      Current Pain Relevant Medications:    Tylenol #3, 6 tabs per day  Clonazepam 2 mg BID  Valium 10 mg at HS  Adderall 20 mg daily  Sertraline 50 mg daily     Previous Pain Relevant Medications: (H--helped; HI--Helped initially; SWH--Somewhat helpful; NH--No help; W--worse; SE--side effects; ?--Unsure if helpful)   NOTE: This medication information taken from patient's intake form, not medical records.                         Opiates: Codeine: NH, Morphine: SE fatigue, Tramadol: NH, Buprenorphine:NH                        NSAIDS: Ibuprofen:Nh, Naproxen:NH,                         Muscle Relaxants: SOMA:H for sleep, Clonazepam: SWH, Cyclobenzaprine: NH, Methocarbamol: NH                        Anti-migraine mediations: none                        Anti-depressants: Buprenorphine: NH, Citalopram: NH, Prozac: Reduced libido, Paxil: SE< Trazodone: Bad dreams, dry mouth, Sertraline: Currently taking.                         Sleep aids:Xanax:J for sleep. Clonazepam: H for anxiety, Diazepam:H for sleep                         Anxiolytics: Xanax: H, Clonazepam: H, currently taking, Diazepam:H                        Anti-convulsants: Gabapentin: tired, sedation                                         "     Topicals: Lidocaine: NH                        Other medications not covered above: Tylenol:NH, Medical Cannabis: certified by [facility]. [facility]     Any illicit drug use: History of discharge from pain clinic for THC use.   EtOH use: twice monthly few drinks,   Caffeine use: one daily  Nicotine use: daily, e-cig  Any use of prescriptions other than how they were prescribed: notes taking extra pain pills at times when pain is not controlled.       [place] Board of Pharmacy Data Base Reviewed:    YES; Multiple controlled substances    SELF CARE:   How often do you practice SELF-CARE (relaxing, stretching, pacing, monitoring posture, taking mini-breaks) in a typical day:  6+ times daily      Medications:  Current Outpatient Prescriptions   Medication Sig Dispense Refill     diazepam (VALIUM) 5 MG tablet Take 1 tablet (5 mg) by mouth every 12 hours as needed for muscle spasms or pain 40 tablet 0     Amphetamine-Dextroamphetamine (ADDERALL PO) Take 20 mg by mouth 2 times daily.       Sertraline HCl (ZOLOFT PO) Take 100 mg by mouth daily.       CLONAZEPAM PO Take 0.5 mg by mouth 3 times daily.         Review of Systems: A 10-point review of systems was negative, with the exception of chronic pain issues.      Social History: Reviewed; unchanged from initial consultation.      Family history: Reviewed; unchanged from initial consultation.     PHYSICAL EXAM    Vitals:    07/19/17 1413   BP: 128/73   BP Location: Left arm   Patient Position: Chair   Cuff Size: Adult Small   Pulse: 79   Resp: 16       Constitutional: healthy, alert and no distress  HEENT: Head atraumatic, normocephalic. Eyes without conjunctival injection or jaundice. Neck supple. No obvious neck masses.  Skin: No rash, lesions, or petechiae of exposed skin.   Extremities: Peripheral pulses intact. No clubbing, cyanosis, or edema.   Psychiatric/mental status: Alert, without lethargy or stupor. Appropriate affect. Mood normal.     Neurologic  exam:  CN:  Cranial nerves 2-12 are grossly normal.  Motor:  5/5 UE and LE strength    Musculoskeletal exam:  Cervical spine:                       Flex:  30 degrees                       Ext: 30 degrees                       Rotation to right: 30 degrees                       Rotation to left: 30 degrees                       Rotation/ext to right: stiff                       Rotation/ext to left: stiff                       Tenderness in the cervical spine at midline. No                       Tenderness in the cervical paraspinal muscles. No  Thoracic spine:                        Kyphosis. Yes                       Tenderness in the thoracic spine at midline. No                       Tenderness in the thoracic paraspinal muscles. No  Lumbar/Sacral spine:                       Forward Flexion:  90 degrees                       Ext: 30 degrees, painful                       Rotation/ext to right: painful                        Rotation/ext to left:: pain free                       Lordosis. No                       Tenderness in the lumbar spine at midline. No                       Tenderness in the lumbar paraspinal muscles.No                       Straight leg exam:                                            Right: negative                                             Left:  negative                       Evangelina/Medardo's test:                                              Right: negative                                             Left:  negative                       Passive internal rotation:                                            Right: negative                                             Left: negative                        Active external rotation:                                             Right: negative                                             Left: negative                       Tenderness over piriformis:                                            Right: positive                                              Left:  negative                       Tenderness over Trochanteric Bursa:                                            Right: negative                                             Left: negative         Psychiatric:  mentation appears normal., affect and mood elevated, rapid speech pattern     DIRE Score for ongoing opioid management is calculated as follows:    Diagnosis = 2 pts (slowly progressive; moderate pain/objective findings)    Intractability = 2 pts (most treatments tried; patient not fully engaged/barriers)    Risk        Psych = 1 pt (serious personality dysfunction/mental illness that significantly interferes with care)         Chem Hlth = 2 pts (use of medications to cope with stress; chemical dependency in remission)       Reliability = 2 pts (occasional difficulties with compliance; generally reliable)       Social = 2 pts (reduction in some relationships/life rolls)       (Psych + Chem hlth + Reliability + Social) = 11    Efficacy = 1 pt (poor function; minimal pain relief despite mod/high med dose)    DIRE Score = 12        7-13: likely NOT suitable candidate for long-term opioid analgesia       14-21: may be a suitable candidate for long-term opioid analgesia        Previous Diagnostic Tests:   Imaging Studies:   See scanned imaging reports.      ASSESSMENT:   1.  Thoracic DDD, MIld scoliosis of mid to lower thoracic spine.   2.  Chemical coping style vs chemical dependency  3.  Chronic low back, indeterminate cause, no imaging available.     Hong returns to discuss treatment options. As previously noted, he has had differing opinions on whether he should proceed with an SI fusion. He has had multiple injecitons which have not proven to be helpful. Reviewed multidisciplinary pain management approach and he is interested in health psychology. I have provided a referral.     Medications: recommend talking with psychiatrist about non-benzodiazapine medications for anxiety ie: Buspar, SSRIs.   We will consider assuming prescribing of opiates when he is no longer on benzos.     He will be applying to [place] for their inpatient pain management program and will keep us appraised of that situation.     Plan:    Diagnosis reviewed, treatment option addressed, and risk/benifits discussed.  Self-care instructions given.  I am recommending a multidisciplinary treatment plan to help this patient better manage pain.      1. Talk with your psychiatrist about non-benzodiazapine medications for anxiety ie: Buspar, SSRIs.    2. Schedule appt with Dr Jana Kirkpatrick, health psychologist. Contact info provided.   3. Schedule follow-up with MOISES Morris, NP-C in 1 week after discontinuing Diazepam and clonazepam. We will collect a drug screen at that time.   4. Let us know if you need a referral to the [place] pain program.  5. Medication recommendations:   1. Discontinue meds as noted above with the guidance of your psychiatrist.   2. No change to other medications at this time.     Total time spent face to face was 30 minutes and more than 50% of face to face time was spent in counseling and/or coordination of care regarding the diagnosis and recommendations above.      MOISES Davenport, NP-C   [facility]

## 2017-07-19 NOTE — NURSING NOTE
"Chief Complaint   Patient presents with     Pain       Initial /73 (BP Location: Left arm, Patient Position: Chair, Cuff Size: Adult Small)  Pulse 79  Resp 16 Estimated body mass index is 25.07 kg/(m^2) as calculated from the following:    Height as of 6/24/12: 1.854 m (6' 1\").    Weight as of 6/26/17: 86.2 kg (190 lb).  Medication Reconciliation: complete       Jaymie Martinez MA  [facility]      "

## 2017-07-19 NOTE — PATIENT INSTRUCTIONS
After Visit Instructions:     Thank you for coming to [facility] for your care. It is my goal to partner with you to help you reach your optimal state of health.     I am recommending multidisciplinary care at this time.  The focus of care will be to continue gradual rehabilitation and pain management with medication adjustments as needed.    Continue daily self-care, identifying contributing factors, and monitoring variations in pain level. Continue to integrate self-care into your life.      1. Talk with your psychiatrist about non-benzodiazapine medications for anxiety ie: Buspar, SSRIs.    2. Schedule appt with Dr Jnaa Kirkpatrick, health psychologist. Contact info provided.   3. Schedule follow-up with MOISES Morris, NP-C in 1 week after discontinuing Diazepam and clonazepam. We will collect a drug screen at that time.   4. Let us know if you need a referral to the [place] pain program.  5. Medication recommendations:   1. Discontinue meds as noted above with the guidance of your psychiatrist.   2. No change to other medications at this time.       MOISES Davenport, NP-C  [facility]    Contact information: [facility]    Please call if any side effects, questions, or concerns arise.    Nurse Triage line:  [phone]   Call this number with any questions or concerns. You may leave a detailed message anytime. Calls are typically returned Monday through Friday between 8 AM and 4:30 PM. We usually get back to you within 2 business days depending on the issue/request.    Scheduling number: [phone].  Call this number to schedule or change appointments.          Medication Refills Policy:    For non-narcotic medications, please your pharmacy directly to request a refill and the pharmacy will call the Pain Management Center for authorization. Please allow 3-4 days for these refills.    For narcotic refills, call the nurse triage  line and leave a message requesting your refill along with the name of the pharmacy that you use. Narcotic prescriptions will be mailed to your pharmacy or you may pick them up at the clinic.  Please call 7-10 days before your refill is due  The above policy allows adequate time so that you do not run out of medication.    No Show - Late Cancellation - Late Arrival Policy  We believe regular attendance is key to your success in our program.    Any time you are unable to keep your appointment we ask that you call us at  least 24 hours in advance to let us know. This will allow us to offer the appointment time to another patient. The following is our policy for missed appointments. This also applies to appointments cancelled with less than 24 hours notice.    You will receive a letter after missing your 1st and 2nd appointments without contacting the clinic before your scheduled appointment time.     After missing 3 appointments without calling first, we will cancel all of your future appointments at [facility].    At that point, you will not be able to resume services unless approved by your care team  We understand that unforseen circumstances arise, however, out of respect for all concerned and to provide this appointment to another patient, this policy will be enforced.    Please note that most follow up appointments are 30 minutes long. If you arrive late, your provider may not be able to see you for the entire 30 minutes. Please also note that if you arrive more than 15 minutes for any appointment, it may be rescheduled.

## 2017-08-03 ENCOUNTER — TELEPHONE (OUTPATIENT)
Dept: PALLIATIVE MEDICINE | Facility: CLINIC | Age: 27
End: 2017-08-03

## 2017-08-03 NOTE — LETTER
RE: Hong Santiago  [address] [place] [address]      August 31, 2017    To Whom it May Concern:     Hong Santiago has been my patient since 6/26/17 for chronic thoracic and lumbar pain. He has previously been a patient at [facility] and [facility] in [place]. He has previously participated in multiple sessions of physical therapy, had interventional injections and has tried medications as documented in my office notes. These interventions have not proven to provide long lasting pain relief. Therefore I am referring him to the [facility] service to explore additional options for treating his pain.     Thank you,           MOISES Davenport, NP-C  [facility]

## 2017-08-03 NOTE — TELEPHONE ENCOUNTER
Flor (mother) calling, she states that the patient has been trying to get into the [facility] rehab clinic but they ar needing a referral. The [facility] number is [phone], please call that number to start the process. Flor's number is [phone]      Alta JACKMAN    [facility]

## 2017-08-07 NOTE — TELEPHONE ENCOUNTER
Amanda, Please call and find out what [facility] needs specifically so I can write the referral. I recommended their inpatient pain program but I am not sure how they need that referral written.     Thanks, Laura

## 2017-08-08 NOTE — TELEPHONE ENCOUNTER
"Called Physician Referral Center at the [facility]: [phone]. I spoke to [place]. This is not an inpatient program. It is a  3 week day program where the patient retreats to there own lodging at night. There is no overnight \"inpatient\" program at the [facility].    Things to be included in the referral are as follows:  Referral letter  Current med list  List of past pain medications used  Comprehensive medical evaluation such as a physical from PCP  Pain related interventions the patients has tried  Problem list  Faxed to [phone].     "

## 2017-08-21 NOTE — TELEPHONE ENCOUNTER
VM left today at: 1:22 pm    Mother calling in regards to update about [facility] referral. Please advise. Ph. [phone]      Miriam BLANTON    [facility]

## 2017-08-21 NOTE — TELEPHONE ENCOUNTER
Routing to provider to review referral request with information needed below.     Luci Cheng  BSN-RN Care Coordinator  [facility]

## 2017-08-22 NOTE — TELEPHONE ENCOUNTER
The website for this program is:   [email or web address]    Sending to provider as she will need to do a formal referral with the below noted information and then we can fax over to [place].     Frida Hart RN, [facility]  Pain Clinic Care Coordinator

## 2017-08-29 NOTE — TELEPHONE ENCOUNTER
Alo (father) is calling to follow up on a referral from Laura. They are still waiting for this referral,please call to discuss. You can call Alo or Hong ( he works over nights and is not up until afternoon otherwise leave a detailed message).      Alta JACKMAN    [facility]

## 2017-08-30 NOTE — TELEPHONE ENCOUNTER
Patients father Alo is calling again. I spoke to him at length. He is very concerned with Neville current state related to his pain and would like to have this referral expedited.    CALEB CanoN, RN  Care Coordinator  [facility]

## 2017-08-31 NOTE — TELEPHONE ENCOUNTER
Letter written, printed and placed in RN basket with my last two office notes.     MOISES Davenport, NP-C  [facility]

## 2017-09-05 NOTE — TELEPHONE ENCOUNTER
The below information was faxed to the [facility] at [phone]. The patient was notified. He will be contacted by the Clinic after the nurses review the referral packet. They will let him know if he has been accepted and if he has they will mail him the information about the program to review. The patient was notified.    JOHANN Cano, RN  Care Coordinator  [facility]

## 2018-04-23 ENCOUNTER — OFFICE VISIT (OUTPATIENT)
Dept: FAMILY MEDICINE | Facility: CLINIC | Age: 28
End: 2018-04-23

## 2018-04-23 VITALS
BODY MASS INDEX: 24.13 KG/M2 | WEIGHT: 188 LBS | HEIGHT: 74 IN | DIASTOLIC BLOOD PRESSURE: 60 MMHG | OXYGEN SATURATION: 96 % | SYSTOLIC BLOOD PRESSURE: 132 MMHG | HEART RATE: 64 BPM | TEMPERATURE: 97.7 F | RESPIRATION RATE: 20 BRPM

## 2018-04-23 DIAGNOSIS — F41.1 GAD (GENERALIZED ANXIETY DISORDER): ICD-10-CM

## 2018-04-23 DIAGNOSIS — R05.9 COUGH: Primary | ICD-10-CM

## 2018-04-23 DIAGNOSIS — R03.0 ELEVATED BLOOD PRESSURE READING WITHOUT DIAGNOSIS OF HYPERTENSION: ICD-10-CM

## 2018-04-23 PROBLEM — G89.29 CHRONIC BILATERAL LOW BACK PAIN WITHOUT SCIATICA: Status: RESOLVED | Noted: 2017-05-08 | Resolved: 2018-04-23

## 2018-04-23 PROBLEM — M54.50 CHRONIC BILATERAL LOW BACK PAIN WITHOUT SCIATICA: Status: RESOLVED | Noted: 2017-05-08 | Resolved: 2018-04-23

## 2018-04-23 PROBLEM — M54.6 PAIN IN THORACIC SPINE: Status: RESOLVED | Noted: 2017-05-08 | Resolved: 2018-04-23

## 2018-04-23 PROBLEM — M51.34 DEGENERATION OF THORACIC INTERVERTEBRAL DISC: Status: ACTIVE | Noted: 2018-04-23

## 2018-04-23 LAB
% GRANULOCYTES: 56.9 %
HCT VFR BLD AUTO: 47.2 % (ref 40–53)
HEMOGLOBIN: 15.5 G/DL (ref 13.3–17.7)
LYMPHOCYTES NFR BLD AUTO: 33.7 %
MCH RBC QN AUTO: 30.2 PG (ref 26–33)
MCHC RBC AUTO-ENTMCNC: 32.8 G/DL (ref 31–36)
MCV RBC AUTO: 92.1 FL (ref 78–100)
MONOCYTES NFR BLD AUTO: 9.4 %
PLATELET COUNT - QUEST: 240 10^9/L (ref 150–375)
RBC # BLD AUTO: 5.13 10*12/L (ref 4.4–5.9)
WBC # BLD AUTO: 5.7 10*9/L (ref 4–11)

## 2018-04-23 PROCEDURE — 71046 X-RAY EXAM CHEST 2 VIEWS: CPT | Performed by: PHYSICIAN ASSISTANT

## 2018-04-23 PROCEDURE — 99202 OFFICE O/P NEW SF 15 MIN: CPT | Performed by: PHYSICIAN ASSISTANT

## 2018-04-23 PROCEDURE — 85025 COMPLETE CBC W/AUTO DIFF WBC: CPT | Performed by: PHYSICIAN ASSISTANT

## 2018-04-23 PROCEDURE — 36415 COLL VENOUS BLD VENIPUNCTURE: CPT | Performed by: PHYSICIAN ASSISTANT

## 2018-04-23 RX ORDER — ALBUTEROL SULFATE 90 UG/1
2 AEROSOL, METERED RESPIRATORY (INHALATION) EVERY 6 HOURS PRN
Qty: 1 INHALER | Refills: 0 | COMMUNITY
Start: 2018-04-23 | End: 2018-08-16

## 2018-04-23 RX ORDER — DIAZEPAM 10 MG
10 TABLET ORAL EVERY 12 HOURS PRN
Qty: 20 TABLET | Refills: 0 | COMMUNITY
Start: 2018-04-23 | End: 2018-08-16 | Stop reason: DRUGHIGH

## 2018-04-23 RX ORDER — ALBUTEROL SULFATE 90 UG/1
2 AEROSOL, METERED RESPIRATORY (INHALATION) EVERY 6 HOURS PRN
Qty: 1 INHALER | Refills: 1 | COMMUNITY
Start: 2018-04-23 | End: 2018-08-16

## 2018-04-23 RX ORDER — AZITHROMYCIN 250 MG/1
TABLET, FILM COATED ORAL
Qty: 6 TABLET | Refills: 0 | Status: SHIPPED | OUTPATIENT
Start: 2018-04-23 | End: 2018-08-16

## 2018-04-23 RX ORDER — CLONAZEPAM 1 MG/1
1.5 TABLET, ORALLY DISINTEGRATING ORAL 2 TIMES DAILY PRN
Qty: 90 TABLET | COMMUNITY
Start: 2018-04-23 | End: 2019-09-03

## 2018-04-23 RX ORDER — FLUVOXAMINE MALEATE 100 MG
100 TABLET ORAL AT BEDTIME
COMMUNITY
Start: 2018-04-23 | End: 2018-08-16

## 2018-04-23 NOTE — PROGRESS NOTES
"SUBJECTIVE:                                                    Hong Santiago is a 27 year old male who presents to clinic today for the following health issues:    Hong is here for URI sx that started 7 days ago.   Cough is productive.  Slight SOB  No recent contacts have been ill    OTC: Mucinex Cough and Cold    Seen at [facility] yesterday - dg with bronichitis  Albuterol 2 puffs this am - doesn't seem to help sx.         ROS:  C: NEGATIVE for fever, chills, change in weight  Eyes: NEGATIVE for vision changes or irritation  Ears: NEGATIVE for pain, discharge, decreased hearing  + Facial pressure   Nose: NEGATIVE for rhinorrhea, epistaxis or congestion  Mouth: NEGATIVE for ulcerations, sore throat.   R: + significant cough or SOB  CV: NEGATIVE for chest pain, palpitations or peripheral edema  GI: Stomach feel \"twisting for the last week\", looser stools  : NEGATIVE for frequency, dysuria, or hematuria    Pt is new to our clinic.    I have reviewed the following histories: Past Medical History, Past Surgical History, Social History, Family History, Problem List, Medication List and Allergies      EDWARD:   Martha Ahn  [place]  [facility]          BP Readings from Last 3 Encounters:   04/23/18 132/60   07/19/17 128/73   06/26/17 122/77    Wt Readings from Last 3 Encounters:   04/23/18 85.3 kg (188 lb)   06/26/17 86.2 kg (190 lb)   05/24/17 84.6 kg (186 lb 9.6 oz)            Patient Active Problem List   Diagnosis     Degeneration of thoracic intervertebral disc     Elevated blood pressure reading without diagnosis of hypertension     Past Surgical History:   Procedure Laterality Date     ORTHOPEDIC SURGERY Right 2010    [facility]       Social History   Substance Use Topics     Smoking status: Current Some Day Smoker     Smokeless tobacco: Never Used      Comment: Pack a month     Alcohol use Yes      Comment: rare     Family History   Problem Relation Age of Onset     Depression Mother      " "Bipolar Disorder Brother      Alcoholism Brother          Current Outpatient Prescriptions   Medication Sig Dispense Refill     albuterol (PROAIR HFA/PROVENTIL HFA/VENTOLIN HFA) 108 (90 Base) MCG/ACT Inhaler Inhale 2 puffs into the lungs every 6 hours as needed for shortness of breath / dyspnea or wheezing 1 Inhaler 1     albuterol (PROAIR HFA/PROVENTIL HFA/VENTOLIN HFA) 108 (90 Base) MCG/ACT Inhaler Inhale 2 puffs into the lungs every 6 hours as needed for shortness of breath / dyspnea or wheezing 1 Inhaler 0     clonazePAM (KLONOPIN) 2 MG tablet Take 1 tablet (2 mg) by mouth 2 times daily as needed for anxiety 90 tablet      diazepam (VALIUM) 10 MG tablet Take 1 tablet (10 mg) by mouth every 12 hours as needed for anxiety or sleep 20 tablet 0     fluvoxaMINE (LUVOX) 100 MG tablet Take 1 tablet (100 mg) by mouth At Bedtime       [DISCONTINUED] CLONAZEPAM PO Take 0.5 mg by mouth 3 times daily.         No Known Allergies    OBJECTIVE:                                                    /60 (BP Location: Right arm, Patient Position: Chair, Cuff Size: Adult Regular)  Pulse 64  Temp 97.7  F (36.5  C) (Oral)  Resp 20  Ht 1.867 m (6' 1.5\")  Wt 85.3 kg (188 lb)  SpO2 96%  BMI 24.47 kg/m2 Body mass index is 24.47 kg/(m^2).     GENERAL: alert and no distress  HEAD: Normocephalic, atraumatic  EYES: Eyes grossly normal to inspection, extraocular movements - intact  EARS:   Right: External ear and canal normal, TM normal  Left: External ear and canal normal, TM normal  NOSE: No discharge noted  MOUTH/THROAT: no ulcers, no lesions, pharynx non-erythematous, no exudates present, tonsils without hypertrophy, mucous membranes moist.   NECK: no tenderness, no adenopathy, no asymmetry, no masses, no stiffness; thyroid- normal to palpation  RESP: bilateral insp. Wheezing and rhonchi - no crackles   CV: regular rates and rhythm, normal S1 S2, no S3 or S4 and no murmur, no click or rub -    Labs Resulted Today:   Results for " orders placed or performed in visit on 04/23/18   CL AFF HEMOGRAM/PLATE/DIFF (BFP)   Result Value Ref Range    WBC 5.7 4.0 - 11 10*9/L    RBC Count 5.13 4.4 - 5.9 10*12/L    Hemoglobin 15.5 13.3 - 17.7 g/dL    Hematocrit 47.2 40.0 - 53.0 %    MCV 92.1 78 - 100 fL    MCH 30.2 26 - 33 pg    MCHC 32.8 31 - 36 g/dL    Platelet Count 240 150 - 375 10^9/L    % Granulocytes 56.9 %    % Lymphocytes 33.7 %    % Monocytes 9.4 %     Xray: linear opacity right lung         ASSESSMENT/PLAN:                                                      1. Cough    - albuterol (PROAIR HFA/PROVENTIL HFA/VENTOLIN HFA) 108 (90 Base) MCG/ACT Inhaler; Inhale 2 puffs into the lungs every 6 hours as needed for shortness of breath / dyspnea or wheezing  Dispense: 1 Inhaler; Refill: 1  - CL AFF HEMOGRAM/PLATE/DIFF (BFP)  - albuterol (PROAIR HFA/PROVENTIL HFA/VENTOLIN HFA) 108 (90 Base) MCG/ACT Inhaler; Inhale 2 puffs into the lungs every 6 hours as needed for shortness of breath / dyspnea or wheezing  Dispense: 1 Inhaler; Refill: 0  - HC XRAY CHEST, 2 VIEWS  - VENOUS COLLECTION  - azithromycin (ZITHROMAX) 250 MG tablet; Two tablets first day, then one tablet daily for four days.  Dispense: 6 tablet; Refill: 0    Continue albuterol  Add Zpack  See me end of week if not improving    2. Elevated blood pressure reading without diagnosis of hypertension  The patient has high blood pressure today.  I reviewed what hypertension is with the patient.  Handouts on sodium and hypertension provided.    I have advised lifestyle changes including DASH diet, Sodium restriction, smoking cessation (if needed), moderate exercise (goal 150 min per week).    He will Return to the clinic in 6-8 weeks to recheck BP after lifestyle modification.  If BP remains high, will discuss medication management.           3. EDWARD (generalized anxiety disorder)  Psych managed  - clonazePAM (KLONOPIN) 2 MG tablet; Take 1 tablet (2 mg) by mouth 2 times daily as needed for anxiety   Dispense: 90 tablet  - diazepam (VALIUM) 10 MG tablet; Take 1 tablet (10 mg) by mouth every 12 hours as needed for anxiety or sleep  Dispense: 20 tablet; Refill: 0  - fluvoxaMINE (LUVOX) 100 MG tablet; Take 1 tablet (100 mg) by mouth At Bedtime        RTC Fasting CPE 6-8 weeks    Osiris Faust PA-C  [facility]

## 2018-04-23 NOTE — NURSING NOTE
Hong Santiago is here for a recheck of his cough. Went to [facility] yesterday and they recommended him to be seen today at a regular clinic with x-ray and lab.    Questioned patient about current smoking habits.  Pt. currently smokes.  Advised about smoking cessation.  PULSE regular  My Chart:   CLASSIFICATION OF OVERWEIGHT AND OBESITY BY BMI                        Obesity Class           BMI(kg/m2)  Underweight                                    < 18.5  Normal                                         18.5-24.9  Overweight                                     25.0-29.9  OBESITY                     I                  30.0-34.9                             II                 35.0-39.9  EXTREME OBESITY             III                >40                            Patient's  BMI Body mass index is 24.47 kg/(m^2).  http://hin.nhlbi.nih.gov/menuplanner/menu.cgi  Pre-visit planning  Immunizations - up to date  Colonoscopy -   Mammogram -   Asthma -   PHQ9 -    EDWARD-7 -

## 2018-04-23 NOTE — MR AVS SNAPSHOT
"              After Visit Summary   4/23/2018    Hong Santiago    MRN: [id number]           Patient Information     Date Of Birth          1990        Visit Information        Provider Department      4/23/2018 10:30 AM Osiris Faust PA [facility]        Today's Diagnoses     Cough    -  1    Elevated blood pressure reading without diagnosis of hypertension        EDWARD (generalized anxiety disorder)           Follow-ups after your visit        Who to contact     If you have questions or need follow up information about today's clinic visit or your schedule please contact [facility] directly at [phone].  Normal or non-critical lab and imaging results will be communicated to you by MyChart, letter or phone within 4 business days after the clinic has received the results. If you do not hear from us within 7 days, please contact the clinic through Omazehart or phone. If you have a critical or abnormal lab result, we will notify you by phone as soon as possible.  Submit refill requests through IdenTrust or call your pharmacy and they will forward the refill request to us. Please allow 3 business days for your refill to be completed.          Additional Information About Your Visit        MyChart Information     IdenTrust gives you secure access to your electronic health record. If you see a primary care provider, you can also send messages to your care team and make appointments. If you have questions, please call your primary care clinic.  If you do not have a primary care provider, please call [phone] and they will assist you.        Care EveryWhere ID     This is your Care EveryWhere ID. This could be used by other organizations to access your [facility] medical records  [id number]        Your Vitals Were     Pulse Temperature Respirations Height Pulse Oximetry BMI (Body Mass Index)    64 97.7  F (36.5  C) (Oral) 20 1.867 m (6' 1.5\") 96% 24.47 " kg/m2       Blood Pressure from Last 3 Encounters:   04/23/18 132/60   07/19/17 128/73   06/26/17 122/77    Weight from Last 3 Encounters:   04/23/18 85.3 kg (188 lb)   06/26/17 86.2 kg (190 lb)   05/24/17 84.6 kg (186 lb 9.6 oz)              We Performed the Following     CL AFF HEMOGRAM/PLATE/DIFF (BFP)     HC XRAY CHEST, 2 VIEWS     VENOUS COLLECTION          Today's Medication Changes          These changes are accurate as of 4/23/18 11:35 AM.  If you have any questions, ask your nurse or doctor.               Start taking these medicines.        Dose/Directions    azithromycin 250 MG tablet   Commonly known as:  ZITHROMAX   Used for:  Cough   Started by:  Osiris Faust PA        Two tablets first day, then one tablet daily for four days.   Quantity:  6 tablet   Refills:  0            Where to get your medicines      These medications were sent to [facility] [address] [place] [address] [address] AT [facility] [address]  [address], [facility] [address]     Phone:  [phone]     azithromycin 250 MG tablet                Primary Care Provider Office Phone # Fax #    NIKKY Varela [phone] [address] [facility] [address]        Equal Access to Services     SANTO SORENSON AH: Hadii koby ku hadasho Soomaali, waaxda luqadaha, qaybta kaalmada adeegyada, neo crews haybre ramey . So [facility] [place] [facility] [phone].    ATENCIÓN: Si habla español, tiene a santiago disposición servicios gratuitos de asistencia lingüística. Llame al [phone].    We comply with applicable federal civil rights laws and [place] laws. We do not discriminate on the basis of race, color, national origin, age, disability, sex, sexual orientation, or gender identity.            Thank you!     Thank you for choosing [facility]  for your care. Our goal is always to provide you with excellent care. Hearing back from our  patients is one way we can continue to improve our services. Please take a few minutes to complete the written survey that you may receive in the mail after your visit with us. Thank you!             Your Updated Medication List - Protect others around you: Learn how to safely use, store and throw away your medicines at www.disposemymeds.org.          This list is accurate as of 4/23/18 11:35 AM.  Always use your most recent med list.                   Brand Name Dispense Instructions for use Diagnosis    * albuterol 108 (90 Base) MCG/ACT Inhaler    PROAIR HFA/PROVENTIL HFA/VENTOLIN HFA    1 Inhaler    Inhale 2 puffs into the lungs every 6 hours as needed for shortness of breath / dyspnea or wheezing    Cough       * albuterol 108 (90 Base) MCG/ACT Inhaler    PROAIR HFA/PROVENTIL HFA/VENTOLIN HFA    1 Inhaler    Inhale 2 puffs into the lungs every 6 hours as needed for shortness of breath / dyspnea or wheezing    Cough       azithromycin 250 MG tablet    ZITHROMAX    6 tablet    Two tablets first day, then one tablet daily for four days.    Cough       clonazePAM 2 MG tablet    klonoPIN    90 tablet    Take 1 tablet (2 mg) by mouth 2 times daily as needed for anxiety    EDWARD (generalized anxiety disorder)       fluvoxaMINE 100 MG tablet    LUVOX     Take 1 tablet (100 mg) by mouth At Bedtime    EDWARD (generalized anxiety disorder)       VALIUM 10 MG tablet   Generic drug:  diazepam     20 tablet    Take 1 tablet (10 mg) by mouth every 12 hours as needed for anxiety or sleep    EDWARD (generalized anxiety disorder)       * Notice:  This list has 2 medication(s) that are the same as other medications prescribed for you. Read the directions carefully, and ask your doctor or other care provider to review them with you.

## 2018-08-16 ENCOUNTER — OFFICE VISIT (OUTPATIENT)
Dept: FAMILY MEDICINE | Facility: CLINIC | Age: 28
End: 2018-08-16

## 2018-08-16 VITALS
DIASTOLIC BLOOD PRESSURE: 70 MMHG | TEMPERATURE: 98.5 F | HEART RATE: 55 BPM | WEIGHT: 189 LBS | OXYGEN SATURATION: 96 % | BODY MASS INDEX: 24.6 KG/M2 | SYSTOLIC BLOOD PRESSURE: 110 MMHG

## 2018-08-16 DIAGNOSIS — Z76.89 HEALTH CARE HOME: ICD-10-CM

## 2018-08-16 DIAGNOSIS — F41.1 GAD (GENERALIZED ANXIETY DISORDER): ICD-10-CM

## 2018-08-16 DIAGNOSIS — M51.34 DEGENERATION OF THORACIC INTERVERTEBRAL DISC: Primary | ICD-10-CM

## 2018-08-16 DIAGNOSIS — Z71.89 ACP (ADVANCE CARE PLANNING): ICD-10-CM

## 2018-08-16 PROCEDURE — 99213 OFFICE O/P EST LOW 20 MIN: CPT | Performed by: PHYSICIAN ASSISTANT

## 2018-08-16 RX ORDER — DIAZEPAM 2 MG
TABLET ORAL
Qty: 20 TABLET | Refills: 0 | COMMUNITY
Start: 2018-08-16 | End: 2019-03-19

## 2018-08-16 RX ORDER — FLUVOXAMINE MALEATE 100 MG
300 TABLET ORAL AT BEDTIME
COMMUNITY
Start: 2018-08-16 | End: 2021-06-10

## 2018-08-16 ASSESSMENT — ANXIETY QUESTIONNAIRES
IF YOU CHECKED OFF ANY PROBLEMS ON THIS QUESTIONNAIRE, HOW DIFFICULT HAVE THESE PROBLEMS MADE IT FOR YOU TO DO YOUR WORK, TAKE CARE OF THINGS AT HOME, OR GET ALONG WITH OTHER PEOPLE: SOMEWHAT DIFFICULT
1. FEELING NERVOUS, ANXIOUS, OR ON EDGE: NOT AT ALL
2. NOT BEING ABLE TO STOP OR CONTROL WORRYING: SEVERAL DAYS
7. FEELING AFRAID AS IF SOMETHING AWFUL MIGHT HAPPEN: SEVERAL DAYS
3. WORRYING TOO MUCH ABOUT DIFFERENT THINGS: SEVERAL DAYS
6. BECOMING EASILY ANNOYED OR IRRITABLE: NOT AT ALL

## 2018-08-16 ASSESSMENT — PATIENT HEALTH QUESTIONNAIRE - PHQ9: 5. POOR APPETITE OR OVEREATING: NOT AT ALL

## 2018-08-16 NOTE — PROGRESS NOTES
SUBJECTIVE:   Hong Santiago is a 27 year old male who presents to clinic today for the following health issues:    1. Patient is here for clearance to attend [facility] physical therapy.    See notes in Epic for chronic back pain  Chronic thoracic and lumbar pain  Surgery is option, he doesn't want this.  Has done PT in the past few years  Now going to [facility] for tx.    Was at [facility] but discharged due to Cannabis use  Pt states he has enrolled in medical marijuana program which has helped but is too costly.      2. Anxiety - on daily Benzos per Psychiatry    Martha Ahn - PATRICIA  Message [employer] [facility]    3. Working FT at [employer]  Is asking for evaluation for work restrictions which someone has already given him and he would like extended    -------------------------------------    Problem list and histories reviewed & adjusted, as indicated.  Additional history: as documented    Patient Active Problem List   Diagnosis     Degeneration of thoracic intervertebral disc     Elevated blood pressure reading without diagnosis of hypertension     EDWARD (generalized anxiety disorder)     Health Care Home     ACP (advance care planning)     Past Surgical History:   Procedure Laterality Date     ORTHOPEDIC SURGERY Right 2010    bunion       Social History   Substance Use Topics     Smoking status: Former Smoker     Types: Cigarettes     Smokeless tobacco: Former User     Types: Chew      Comment: Pack a month     Alcohol use Yes      Comment: rare     Family History   Problem Relation Age of Onset     Depression Mother      Bipolar Disorder Brother      Alcoholism Brother          Current Outpatient Prescriptions   Medication Sig Dispense Refill     clonazePAM (KLONOPIN) 2 MG tablet Take 1 tablet (2 mg) by mouth 2 times daily as needed for anxiety 90 tablet      diazepam (VALIUM) 2 MG tablet 2 mg at bedtime 20 tablet 0     fluvoxaMINE (LUVOX) 100 MG tablet Take 2 tablets (200 mg) by mouth At Bedtime        [DISCONTINUED] fluvoxaMINE (LUVOX) 100 MG tablet Take 1 tablet (100 mg) by mouth At Bedtime       No Known Allergies    Reviewed and updated as needed this visit by clinical staff  Tobacco  Allergies  Problems  Soc Hx      Reviewed and updated as needed this visit by Provider         ROS:  Constitutional, HEENT, cardiovascular, pulmonary, gi and gu systems are negative, except as otherwise noted.    OBJECTIVE:     /70 (BP Location: Left arm, Patient Position: Sitting, Cuff Size: Adult Large)  Pulse 55  Temp 98.5  F (36.9  C) (Oral)  Wt 85.7 kg (189 lb)  SpO2 96%  BMI 24.6 kg/m2  Body mass index is 24.6 kg/(m^2).       GENERAL: healthy, alert and no distress  EYES: Eyes grossly normal to inspection, PERRL and conjunctivae and sclerae normal  HENT: ear canals and TM's normal, nose and mouth without ulcers or lesions  NECK: no adenopathy, no asymmetry, masses, or scars and thyroid normal to palpation  RESP: lungs clear to auscultation - no rales, rhonchi or wheezes  CV: regular rate and rhythm, normal S1 S2, no S3 or S4, no murmur, click or rub, no peripheral edema and peripheral pulses strong  MS: no gross musculoskeletal defects noted, no edema  Full strength upper/lower extremities  Full ROM cervical/thoracic and lumbar spine        ASSESSMENT/PLAN:         (M51.34) Degeneration of thoracic intervertebral disc  (primary encounter diagnosis)  Comment: stable  Plan: OCCUPATIONAL MEDICINE REFERRAL        OK to attend PT  I advised he needs eval. With [facility] Med provider for long term restrictions  Pt will work with [facility] to get this scheduled    (Z76.89) Health Care Home      (Z71.89) ACP (advance care planning)      (F41.1) EDWARD (generalized anxiety disorder)  Comment: stable  Plan: fluvoxaMINE (LUVOX) 100 MG tablet        Advised caution with daily Benzo use  Advised caution with driving  Pt to be followed by Psych for continued care          NIKKY Varela  [facility]

## 2018-08-16 NOTE — MR AVS SNAPSHOT
After Visit Summary   8/16/2018    Hong Santiago    MRN: [id number]           Patient Information     Date Of Birth          1990        Visit Information        Provider Department      8/16/2018 3:30 PM Osiris Faust PA [facility]        Today's Diagnoses     Degeneration of thoracic intervertebral disc    -  1    Health Care Home        ACP (advance care planning)        EDWARD (generalized anxiety disorder)           Follow-ups after your visit        Additional Services     OCCUPATIONAL MEDICINE REFERRAL       Your provider has referred you to: PREFERRED PROVIDERS:    Please be aware that coverage of these services is subject to the terms and limitations of your health insurance plan.  Call member services at your health plan with any benefit or coverage questions.      Please bring the following to your appointment:  >>   Any x-rays, CTs or MRIs which have been performed.  Contact the facility where they were done to arrange for  prior to your scheduled appointment.    >>   List of current medications   >>   This referral request   >>   Any documents/labs given to you for this referral                  Who to contact     If you have questions or need follow up information about today's clinic visit or your schedule please contact [facility] directly at [phone].  Normal or non-critical lab and imaging results will be communicated to you by MyChart, letter or phone within 4 business days after the clinic has received the results. If you do not hear from us within 7 days, please contact the clinic through MyChart or phone. If you have a critical or abnormal lab result, we will notify you by phone as soon as possible.  Submit refill requests through Biomoda or call your pharmacy and they will forward the refill request to us. Please allow 3 business days for your refill to be completed.          Additional Information  About Your Visit        HomeSphereharTalentSky Information     Augmate gives you secure access to your electronic health record. If you see a primary care provider, you can also send messages to your care team and make appointments. If you have questions, please call your primary care clinic.  If you do not have a primary care provider, please call [phone] and they will assist you.        Care EveryWhere ID     This is your Care EveryWhere ID. This could be used by other organizations to access your [facility] medical records  [id number]        Your Vitals Were     Pulse Temperature Pulse Oximetry BMI (Body Mass Index)          55 98.5  F (36.9  C) (Oral) 96% 24.6 kg/m2         Blood Pressure from Last 3 Encounters:   08/16/18 110/70   04/23/18 132/60   07/19/17 128/73    Weight from Last 3 Encounters:   08/16/18 85.7 kg (189 lb)   04/23/18 85.3 kg (188 lb)   06/26/17 86.2 kg (190 lb)              We Performed the Following     OCCUPATIONAL MEDICINE REFERRAL          Today's Medication Changes          These changes are accurate as of 8/16/18 11:59 PM.  If you have any questions, ask your nurse or doctor.               These medicines have changed or have updated prescriptions.        Dose/Directions    diazepam 2 MG tablet   Commonly known as:  VALIUM   This may have changed:  Another medication with the same name was removed. Continue taking this medication, and follow the directions you see here.   Changed by:  Osiris Faust PA        2 mg at bedtime   Quantity:  20 tablet   Refills:  0       fluvoxaMINE 100 MG tablet   Commonly known as:  LUVOX   This may have changed:  how much to take   Used for:  EDWARD (generalized anxiety disorder)   Changed by:  Osiris Faust PA        Dose:  200 mg   Take 2 tablets (200 mg) by mouth At Bedtime   Refills:  0                Primary Care Provider Office Phone # Fax #    NIKKY Varela [phone] [address]  [facility] [address]        Equal Access to Services     [facility] DELTA : Hadii koby houston padmaja Sofelix, wajanda luqadaha, qaybta kainessaronnie hicks, neo lopezhumbertokerrie lopes. So [facility] [phone].    ATENCIÓN: Si habla español, tiene a santiago disposición servicios gratuitos de asistencia lingüística. Llame al [phone].    We comply with applicable federal civil rights laws and [place] laws. We do not discriminate on the basis of race, color, national origin, age, disability, sex, sexual orientation, or gender identity.            Thank you!     Thank you for choosing [facility]  for your care. Our goal is always to provide you with excellent care. Hearing back from our patients is one way we can continue to improve our services. Please take a few minutes to complete the written survey that you may receive in the mail after your visit with us. Thank you!             Your Updated Medication List - Protect others around you: Learn how to safely use, store and throw away your medicines at www.disposemymeds.org.          This list is accurate as of 8/16/18 11:59 PM.  Always use your most recent med list.                   Brand Name Dispense Instructions for use Diagnosis    clonazePAM 2 MG tablet    klonoPIN    90 tablet    Take 1 tablet (2 mg) by mouth 2 times daily as needed for anxiety    EDWARD (generalized anxiety disorder)       diazepam 2 MG tablet    VALIUM    20 tablet    2 mg at bedtime        fluvoxaMINE 100 MG tablet    LUVOX     Take 2 tablets (200 mg) by mouth At Bedtime    EDWARD (generalized anxiety disorder)

## 2018-08-16 NOTE — NURSING NOTE
Hong is here for clearance for exercise participation at [employer]          Pre-visit Screening:  Immunizations:  up to date  Colonoscopy:  NA  Mammogram: NA  Asthma Action Test/Plan:  NA  PHQ9:  Done today  GAD7:  Done today  Questioned patient about current smoking habits Pt. quit smoking some time ago.  Ok to leave detailed message on voice mail for today's visit only Yes, phone # [phone]

## 2018-08-17 ENCOUNTER — TELEPHONE (OUTPATIENT)
Dept: FAMILY MEDICINE | Facility: CLINIC | Age: 28
End: 2018-08-17

## 2018-08-17 PROBLEM — R03.0 ELEVATED BLOOD PRESSURE READING WITHOUT DIAGNOSIS OF HYPERTENSION: Status: RESOLVED | Noted: 2018-04-23 | Resolved: 2018-08-17

## 2018-08-17 ASSESSMENT — PATIENT HEALTH QUESTIONNAIRE - PHQ9: SUM OF ALL RESPONSES TO PHQ QUESTIONS 1-9: 6

## 2018-08-17 NOTE — TELEPHONE ENCOUNTER
I talked to patient RE note below. At this time he will hold off. He said that he as an upcoming appointment @ [facility]. He will ask that they write the letter for restrictions for work.     Patient will call me back with update.

## 2018-08-17 NOTE — TELEPHONE ENCOUNTER
I left a message for patient to call me back RE the referral for Occupational Medicine. I will suggest     [facility] ( [facility] )   [address] [facility] [address]  [phone] - appt   [phone] - fax     [place] [facility]  [address] [place] [address] [address]  [place] [address]  [phone] -- appt line  [phone] -- fax    Patient will need to call to make his appt. Will also advise that he talks with his work comp  RE this referral / appointment.

## 2019-02-22 ENCOUNTER — TRANSFERRED RECORDS (OUTPATIENT)
Dept: FAMILY MEDICINE | Facility: CLINIC | Age: 29
End: 2019-02-22

## 2019-03-19 ENCOUNTER — OFFICE VISIT (OUTPATIENT)
Dept: FAMILY MEDICINE | Facility: CLINIC | Age: 29
End: 2019-03-19

## 2019-03-19 VITALS
WEIGHT: 210 LBS | HEART RATE: 55 BPM | BODY MASS INDEX: 27.83 KG/M2 | SYSTOLIC BLOOD PRESSURE: 114 MMHG | OXYGEN SATURATION: 98 % | DIASTOLIC BLOOD PRESSURE: 60 MMHG | HEIGHT: 73 IN | TEMPERATURE: 98.1 F

## 2019-03-19 DIAGNOSIS — J01.00 ACUTE NON-RECURRENT MAXILLARY SINUSITIS: Primary | ICD-10-CM

## 2019-03-19 PROCEDURE — 99213 OFFICE O/P EST LOW 20 MIN: CPT | Performed by: PHYSICIAN ASSISTANT

## 2019-03-19 ASSESSMENT — MIFFLIN-ST. JEOR: SCORE: 1966.43

## 2019-03-19 NOTE — PATIENT INSTRUCTIONS
Given that Hong has not tried any treatments for his symptoms, recommended trial of applying heat 2-3 times daily to face, as well as taking a daytime decongestant like Sudafed. He should avoid taking this at night, as it may prevent him from sleeping. If in the next 2-3 days, his sinus symptoms are not improving, given duration of symptoms not responding to over the counter treatment, recommended he complete 10 day course of Augmentin. He should take this twice daily with food. Warned him of possible side effects including loose stool. He should contact me in 1 week if symptoms are not improving, or sooner with any intolerable side effects or worsening symptoms.

## 2019-03-19 NOTE — NURSING NOTE
Hong is here for yellow/green sinus and labored breathing for two weeks.          Pre-visit Screening:  Immunizations:  up to date  Colonoscopy:  NA  Mammogram: NA  Asthma Action Test/Plan:  NA  PHQ9:  NA to today's visit  GAD7:  NA to today's visit  Questioned patient about current smoking habits Pt. Smokes occasionally  Ok to leave detailed message on voice mail for today's visit only Yes, phone # [phone]

## 2019-03-19 NOTE — PROGRESS NOTES
"CC: Sinus symptoms    History:  Hong is here today with 2 weeks of symptoms. His symptoms include yellow and green drainage both out nose and down back of throat. Throat feels very dry and irritated. Has been getting some sweats. He also feels like his breathing has taken more effort. Is getting some facial pain over cheeks, as well as a headache. Has had some body aches, and mild nausea as well. He denies any teeth pain, fever, chills, sore throat, ear pain, cough.     Has not tried any treatments to this point.     PMH, MEDICATIONS, ALLERGIES, SOCIAL AND FAMILY HISTORY in [facility] and reviewed by me personally.      ROS negative other than the symptoms noted above in the HPI.      Examination   /60 (BP Location: Right arm, Patient Position: Sitting, Cuff Size: Adult Large)   Pulse 55   Temp 98.1  F (36.7  C) (Oral)   Ht 1.854 m (6' 1\")   Wt 95.3 kg (210 lb)   SpO2 98%   BMI 27.71 kg/m         Constitutional: Sitting comfortably, in no acute distress. Vital signs noted  Eyes: pupils equal round reactive to light and accomodation, extra ocular movements intact  Ears: external canals and TMs free of abnormalities  Nose: patent, without mucosal abnormalities. Mild tenderness to palpation over maxillary sinuses.   Mouth and throat: without erythema or lesions of the mucosa  Neck:  no adenopathy, trachea midline and normal to palpation  Cardiovascular:  regular rate and rhythm, no murmurs, clicks, or gallops  Respiratory:  normal respiratory rate and rhythm, lungs clear to auscultation  SKIN: No jaundice/pallor/rash.   Psychiatric: mentation appears normal and affect normal/bright        A/P    ICD-10-CM    1. Acute non-recurrent maxillary sinusitis J01.00 amoxicillin-clavulanate (AUGMENTIN) 875-125 MG tablet       DISCUSSION:  Given that Hong has not tried any treatments for his symptoms, recommended trial of applying heat 2-3 times daily to face, as well as taking a daytime decongestant like Sudafed. He " should avoid this at night, as it may prevent him from sleeping. If in the next 2-3 days, his symptoms are not improving, given duration of symptoms, recommended he complete 10 day course of Augmentin. He should take this twice daily with food. Warned him of possible side effects including loose stool. He should contact me in 1 week if symptoms are not improving, or sooner with any intolerable side effects or worsening symptoms.      follow up visit: As needed    Hillary Lujan PA-C  [facility]

## 2019-04-18 ENCOUNTER — OFFICE VISIT (OUTPATIENT)
Dept: FAMILY MEDICINE | Facility: CLINIC | Age: 29
End: 2019-04-18

## 2019-04-18 VITALS
BODY MASS INDEX: 27.94 KG/M2 | WEIGHT: 210.8 LBS | HEART RATE: 67 BPM | TEMPERATURE: 98.5 F | HEIGHT: 73 IN | OXYGEN SATURATION: 97 % | SYSTOLIC BLOOD PRESSURE: 128 MMHG | DIASTOLIC BLOOD PRESSURE: 74 MMHG

## 2019-04-18 DIAGNOSIS — Z11.3 SCREEN FOR STD (SEXUALLY TRANSMITTED DISEASE): ICD-10-CM

## 2019-04-18 DIAGNOSIS — Z20.2 EXPOSURE TO CHLAMYDIA: Primary | ICD-10-CM

## 2019-04-18 PROBLEM — R53.81 OTHER MALAISE: Status: ACTIVE | Noted: 2019-04-18

## 2019-04-18 PROBLEM — F32.A DEPRESSIVE DISORDER: Status: ACTIVE | Noted: 2019-04-18

## 2019-04-18 PROCEDURE — 36415 COLL VENOUS BLD VENIPUNCTURE: CPT | Performed by: PHYSICIAN ASSISTANT

## 2019-04-18 PROCEDURE — 87389 HIV-1 AG W/HIV-1&-2 AB AG IA: CPT | Mod: 90 | Performed by: PHYSICIAN ASSISTANT

## 2019-04-18 PROCEDURE — 99213 OFFICE O/P EST LOW 20 MIN: CPT | Performed by: PHYSICIAN ASSISTANT

## 2019-04-18 PROCEDURE — 87491 CHLMYD TRACH DNA AMP PROBE: CPT | Mod: 90 | Performed by: PHYSICIAN ASSISTANT

## 2019-04-18 PROCEDURE — 87591 N.GONORRHOEAE DNA AMP PROB: CPT | Mod: 90 | Performed by: PHYSICIAN ASSISTANT

## 2019-04-18 PROCEDURE — 86592 SYPHILIS TEST NON-TREP QUAL: CPT | Mod: 90 | Performed by: PHYSICIAN ASSISTANT

## 2019-04-18 RX ORDER — AZITHROMYCIN 500 MG/1
1000 TABLET, FILM COATED ORAL DAILY
Qty: 2 TABLET | Refills: 0 | Status: SHIPPED | OUTPATIENT
Start: 2019-04-18 | End: 2019-06-24

## 2019-04-18 ASSESSMENT — MIFFLIN-ST. JEOR: SCORE: 1970.06

## 2019-04-18 NOTE — NURSING NOTE
Hong is here today for STD testing.    Pre-visit Screening:  Immunizations:  up to date  Colonoscopy:  NA  Mammogram: NA  Asthma Action Test/Plan:  NA  PHQ9:  NA  GAD7:  NA  Questioned patient about current smoking habits Pt. has never smoked.  Ok to leave detailed message on voice mail for today's visit only Yes, phone # [phone]

## 2019-04-18 NOTE — PROGRESS NOTES
"CC: STI testing    History:  Hong recently heard from his ex girlfriend that she tested positive for chlamydia. They have been sexually active together as recently as 1 month ago. He denies any symptoms of an STI, other than a weird sensation at tip of penis before he urinates but this has been going on long term. He cannot remember the last time he had an STD check was, and would like to have full test done today.     PMH, MEDICATIONS, ALLERGIES, SOCIAL AND FAMILY HISTORY in [facility] and reviewed by me personally.      ROS negative other than the symptoms noted above in the HPI.        Examination   /74 (BP Location: Left arm, Patient Position: Sitting, Cuff Size: Adult Large)   Pulse 67   Temp 98.5  F (36.9  C) (Oral)   Ht 1.854 m (6' 1\")   Wt 95.6 kg (210 lb 12.8 oz)   SpO2 97%   BMI 27.81 kg/m         Constitutional: Sitting comfortably, in no acute distress. Vital signs noted  Cardiovascular:  regular rate and rhythm, no murmurs, clicks, or gallops  Respiratory:  normal respiratory rate and rhythm, lungs clear to auscultation  SKIN: No jaundice/pallor/rash.   Psychiatric: mentation appears normal and affect normal/bright        A/P    ICD-10-CM    1. Exposure to chlamydia Z20.2 Chlam Trach/N. Gonorrhoeae RNA TMA([facility]     azithromycin (ZITHROMAX) 500 MG tablet   2. Screen for STD (sexually transmitted disease) Z11.3 VENOUS COLLECTION     Chlam Trach/N. Gonorrhoeae RNA TMA([facility]     HIV 1/2 Agn Brenda 4th Gen w Reflex ([facility])     RPR W/REFLEX TITER & CONFIRM       DISCUSSION:  Agreed with Hong that he should undergo full STD screen. Will order serum tests for HIV/syphilis and urine test for chlamydia/gonnorhea. Discussed pros and cons of herpes serum testing, and pt declined testing this. Given exposure to chlamydia, advised he complete the one time dose of azithromycin, and refrain from sexual activity for 2 weeks to ensure treatment. Take with food, warned of side effects.  Will inform him of " results next week, and if positive for chlamydia, should return in 4 weeks to confirm clearance.     He understands, and agrees with this plan, and will contact me with any further questions.     follow up visit: As needed    [facility]

## 2019-04-19 LAB
CHLAMYDIA TRACHOMATIS RNA, TMA - QUEST: NOT DETECTED
HIV 1/2 AGN ABY 4TH GEN WITH REFLEX: NORMAL
NEISSERIA GONORRHOEAE RNA TMA: NOT DETECTED
RPR SCREEN - QUEST: NORMAL
SEE NOTE - QUEST: NORMAL

## 2019-06-17 ENCOUNTER — THERAPY VISIT (OUTPATIENT)
Dept: PHYSICAL THERAPY | Facility: CLINIC | Age: 29
End: 2019-06-17
Payer: COMMERCIAL

## 2019-06-17 DIAGNOSIS — M51.34 DEGENERATION OF THORACIC INTERVERTEBRAL DISC: ICD-10-CM

## 2019-06-17 PROCEDURE — 97162 PT EVAL MOD COMPLEX 30 MIN: CPT | Mod: GP | Performed by: PHYSICAL THERAPIST

## 2019-06-17 PROCEDURE — 97110 THERAPEUTIC EXERCISES: CPT | Mod: GP | Performed by: PHYSICAL THERAPIST

## 2019-06-17 PROCEDURE — 97112 NEUROMUSCULAR REEDUCATION: CPT | Mod: GP | Performed by: PHYSICAL THERAPIST

## 2019-06-17 NOTE — LETTER
"LONI BARRY PT  [address]   [address]  [facility] [address]  [phone]    2019    Re: Hong Santiago   :   1990  MRN:  [id number]   REFERRING PHYSICIAN:   Bobo BARRY PT    Date of Initial Evaluation:  2019  Visits:  Rxs Used: 1  Reason for Referral:  Degeneration of thoracic intervertebral disc    EVALUATION SUMMARY    [facility] Initial Evaluation -- Thoracic    Evaluation Date: 2019  Hong Santiago is a 28 year old male with a thoracic spine condition.   Referral: Dr. 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 Symptoms (improving/unchanging/worsening):  unchanging.  Symptoms commenced as a result of: not sure; pitched in high school; became chronic over last ten years  Condition occurred in the following environment:    Symptoms at onset: back  Constant symptoms: back  Intermittent symptoms:     Symptoms are made worse with the following: Always Bending, Always Sitting, Always Turning trunk, Time of day - Sometimes as the day progresses and lifting   Symptoms are made better with the following: Always Lying and inversion table    Disturbed sleep (yes/no):  no    Pillows:  1  Sleeping postures(prone/sup/side R/L): back    Previous episodes (0/1-5/6-10/+):  multiple  Year of first episode:     Previous history: chronic over last ten years  Previous treatments: PT multiple episodes over the last ten years.    Re: Hong JADON Anastacioelisa "   :   1990    Specific Questions:   Cough/Sneeze/Deep Breath (pos/neg):  positive  Gait (normal/abnormal):  normal  Medications (nil/NSAIDS/analg/steroids/anticoag/other):  Other - Anti-depressants  Medical allergies:  none  General health (excellent/good/fair/poor):  good  Pertinent medical history:  None and Depression  Imaging (NA/Xray/MRI):  MRI, not recent  Recent or major surgery (yes/no):  no  Night pain (yes/no):  no  Accidents (yes/no):  no  Unexplained weight loss (yes/no):  none  Barriers at home:  none  Other red flags:  none    EXAMINATION    Posture:   Sitting (good/fair/poor): fair   Standing (good/fair/poor): fair  Protruded head (yes/no): yes  Kyphosis (red/acc/normal): red  Correction of posture (better/worse/no effect): worse  Other observations:  none  Neurological:  Motor deficit:  none    Reflexes:    Sensory deficit:  none    Dural signs:  Not tested  Movement Loss:   Percy Mod Min Nil Pain   Flexion   x     Extension  x   pdm   Rotation R   x     Rotation L  x   erp   Other          Cervical Differential Testing:  Rep Pro    Rep Ret    Rep Ret Ext    Rep SB - R    Rep SB - L    Rep Rot - R    Rep Rot - L    Rep Flex      Test Movements:   During: produces, abolishes, increases, decreases, no effect, centralizing, peripheralizing  After: better, worse, no better, no worse, no effect, centralized, peripheralized    Pretest symptoms sitting:    Symptoms During Symptoms After ROM increased ROM decreased No Effect   FLEX        Rep FLEX        EXT        Rep EXT        Re: Hong Santiago   :   1990    Pretest symptoms lying:     Symptoms During Symptoms After ROM increased ROM decreased No Effect   EIL (prone)        Rep EIL (prone)        EIL (supine)        Rep EIL (supine)        Pretest symptoms sitting:  L thoracic   Symptoms During Symptoms After ROM increased ROM decreased No Effect   ROT - R        Rep ROT - R        ROT - L Increases    No Worse         Rep ROT - L  "Increases    No Worse    ?  x   Other          Static Tests:  Flexion:       Rotation R:    Extension (prone/supine):    Rotation L:     Other Tests: scap stabs grossly 4-/5; winging L>R scap;  Provisional Classification: Inconclusive/Other - Chronic Pain Syndrome and Mechanically Inconclusive  Principle of Management:  Education:  Centralization, therapeutic dose of exercise, \"hurt does not equal harm\",    Equipment provided:  none  Mechanical therapy (Y/N):  ?    Extension principle:     Flexion principle:    Lateral principle:      Other: Repeated L Rotation in sitting x 10/2 hrs  ASSESSMENT/PLAN:    Patient is a 28 year old male with thoracic complaints.    Patient has the following significant findings with corresponding treatment plan.                Diagnosis 1:  Thoracic spine pain  Pain -  manual therapy, self management, education, directional preference exercise and home program  Decreased ROM/flexibility - manual therapy and therapeutic exercise  Decreased joint mobility - manual therapy and therapeutic exercise  Decreased strength - therapeutic exercise and therapeutic activities  Decreased function - therapeutic activities  Impaired posture - neuro re-education    Therapy Evaluation Codes:   1) History comprised of:   Personal factors that impact the plan of care:      Anxiety and Time since onset of symptoms.    Comorbidity factors that impact the plan of care are:      Depression and Anxiety.     Medications impacting care: Anti-depressant.  2) Examination of Body Systems comprised of:   Body structures and functions that impact the plan of care:      Thoracic Spine.   Activity limitations that impact the plan of care are:      Lifting, Sitting and Working.  Re: Hong Santiago   :   1990    3) Clinical presentation characteristics are:   Evolving/Changing.  4) Decision-Making    Moderate complexity using standardized patient assessment instrument and/or measureable assessment of functional " outcome.  Cumulative Therapy Evaluation is: Moderate complexity.    Previous and current functional limitations:  (See Goal Flow Sheet for this information)    Short term and Long term goals: (See Goal Flow Sheet for this information)     Communication ability:  Patient appears to be able to clearly communicate and understand verbal and written communication and follow directions correctly.  Treatment Explanation - The following has been discussed with the patient:   RX ordered/plan of care  Anticipated outcomes  Possible risks and side effects  This patient would benefit from PT intervention to resume normal activities.   Rehab potential is fair.    Frequency:  2 X week, once daily  Duration:  for 12 weeks (per doctor order)  Discharge Plan:  Achieve all LTG.  Independent in home treatment program.  Reach maximal therapeutic benefit.    Please refer to the daily flowsheet for treatment today, total treatment time and time spent performing 1:1 timed codes.     Thank you for your referral.    INQUIRIES  Therapist: Mona Wilks, PT, Cert, MDT  [facility]  [address]  [address] [place] [id number]  Phone: [phone]  Fax: [phone]

## 2019-06-17 NOTE — PROGRESS NOTES
"[facility] Initial Evaluation -- Thoracic    Evaluation Date: June 17, 2019  Hong Santiago is a 28 year old male with a thoracic spine condition.   Referral: Dr. 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 Symptoms (improving/unchanging/worsening):  unchanging.  Symptoms commenced as a result of: not sure; pitched in high school; became chronic over last ten years  Condition occurred in the following environment:      Symptoms at onset: back  Constant symptoms: back  Intermittent symptoms:     Symptoms are made worse with the following: Always Bending, Always Sitting, Always Turning trunk, Time of day - Sometimes as the day progresses and lifting   Symptoms are made better with the following: Always Lying and inversion table    Disturbed sleep (yes/no):  no    Pillows:  1  Sleeping postures(prone/sup/side R/L): back    Previous episodes (0/1-5/6-10/11+):  multiple  Year of first episode: 2009    Previous history: chronic over last ten years  Previous treatments: PT multiple episodes over the last ten years.    Specific Questions:   Cough/Sneeze/Deep Breath (pos/neg):  positive  Gait (normal/abnormal):  normal  Medications (nil/NSAIDS/analg/steroids/anticoag/other):  Other - Anti-depressants  Medical allergies:  none  General health (excellent/good/fair/poor):  good  Pertinent medical history:  None and Depression  Imaging (NA/Xray/MRI):  MRI, not recent  Recent or major surgery (yes/no):  no  Night pain " "(yes/no):  no  Accidents (yes/no):  no  Unexplained weight loss (yes/no):  none  Barriers at home:  none  Other red flags:  none    EXAMINATION    Posture:   Sitting (good/fair/poor): fair   Standing (good/fair/poor): fair  Protruded head (yes/no): yes  Kyphosis (red/acc/normal): red    Correction of posture (better/worse/no effect): worse  Other observations:  none    Neurological:    Motor deficit:  none    Reflexes:    Sensory deficit:  none    Dural signs:  Not tested    Movement Loss:   Percy Mod Min Nil Pain   Flexion   x     Extension  x   pdm   Rotation R   x     Rotation L  x   erp   Other          Cervical Differential Testing:  Rep Pro    Rep Ret    Rep Ret Ext    Rep SB - R    Rep SB - L    Rep Rot - R    Rep Rot - L    Rep Flex      Test Movements:   During: produces, abolishes, increases, decreases, no effect, centralizing, peripheralizing  After: better, worse, no better, no worse, no effect, centralized, peripheralized    Pretest symptoms sitting:    Symptoms During Symptoms After ROM increased ROM decreased No Effect   FLEX        Rep FLEX        EXT        Rep EXT        Pretest symptoms lying:     Symptoms During Symptoms After ROM increased ROM decreased No Effect   EIL (prone)        Rep EIL (prone)        EIL (supine)        Rep EIL (supine)        Pretest symptoms sitting:  L thoracic   Symptoms During Symptoms After ROM increased ROM decreased No Effect   ROT - R        Rep ROT - R        ROT - L Increases    No Worse         Rep ROT - L Increases    No Worse    ?  x   Other          Static Tests:  Flexion:       Rotation R:    Extension (prone/supine):    Rotation L:       Other Tests: scap stabs grossly 4-/5; winging L>R scap;    Provisional Classification: Inconclusive/Other - Chronic Pain Syndrome and Mechanically Inconclusive    Principle of Management:  Education:  Centralization, therapeutic dose of exercise, \"hurt does not equal harm\",     Equipment provided:  none  Mechanical therapy " (Y/N):  ?    Extension principle:     Flexion principle:    Lateral principle:      Other: Repeated L Rotation in sitting x 10/2 hrs    ASSESSMENT/PLAN:    Patient is a 28 year old male with thoracic complaints.    Patient has the following significant findings with corresponding treatment plan.                Diagnosis 1:  Thoracic spine pain  Pain -  manual therapy, self management, education, directional preference exercise and home program  Decreased ROM/flexibility - manual therapy and therapeutic exercise  Decreased joint mobility - manual therapy and therapeutic exercise  Decreased strength - therapeutic exercise and therapeutic activities  Decreased function - therapeutic activities  Impaired posture - neuro re-education    Therapy Evaluation Codes:   1) History comprised of:   Personal factors that impact the plan of care:      Anxiety and Time since onset of symptoms.    Comorbidity factors that impact the plan of care are:      Depression and Anxiety.     Medications impacting care: Anti-depressant.  2) Examination of Body Systems comprised of:   Body structures and functions that impact the plan of care:      Thoracic Spine.   Activity limitations that impact the plan of care are:      Lifting, Sitting and Working.  3) Clinical presentation characteristics are:   Evolving/Changing.  4) Decision-Making    Moderate complexity using standardized patient assessment instrument and/or measureable assessment of functional outcome.  Cumulative Therapy Evaluation is: Moderate complexity.    Previous and current functional limitations:  (See Goal Flow Sheet for this information)    Short term and Long term goals: (See Goal Flow Sheet for this information)     Communication ability:  Patient appears to be able to clearly communicate and understand verbal and written communication and follow directions correctly.  Treatment Explanation - The following has been discussed with the patient:   RX ordered/plan of  care  Anticipated outcomes  Possible risks and side effects  This patient would benefit from PT intervention to resume normal activities.   Rehab potential is fair.    Frequency:  2 X week, once daily  Duration:  for 12 weeks (per doctor order)  Discharge Plan:  Achieve all LTG.  Independent in home treatment program.  Reach maximal therapeutic benefit.    Please refer to the daily flowsheet for treatment today, total treatment time and time spent performing 1:1 timed codes.

## 2019-06-24 ENCOUNTER — OFFICE VISIT (OUTPATIENT)
Dept: FAMILY MEDICINE | Facility: CLINIC | Age: 29
End: 2019-06-24

## 2019-06-24 VITALS
WEIGHT: 211 LBS | SYSTOLIC BLOOD PRESSURE: 110 MMHG | BODY MASS INDEX: 27.84 KG/M2 | TEMPERATURE: 98.4 F | HEART RATE: 74 BPM | OXYGEN SATURATION: 97 % | DIASTOLIC BLOOD PRESSURE: 64 MMHG

## 2019-06-24 DIAGNOSIS — J01.90 ACUTE NON-RECURRENT SINUSITIS, UNSPECIFIED LOCATION: ICD-10-CM

## 2019-06-24 DIAGNOSIS — J20.9 ACUTE BRONCHITIS, UNSPECIFIED ORGANISM: Primary | ICD-10-CM

## 2019-06-24 PROCEDURE — 99213 OFFICE O/P EST LOW 20 MIN: CPT | Performed by: PHYSICIAN ASSISTANT

## 2019-06-24 NOTE — NURSING NOTE
Que is here for coughing up flem and SOB, also has sinus congestion, symptoms have been going on for 5 days.        Pre-visit Screening:  Immunizations:  up to date  Colonoscopy:  NA  Mammogram: NA  Asthma Action Test/Plan:  NA  PHQ9:  NA to today's visit  GAD7:  NA  Questioned patient about current smoking habits Pt. Casual smoking  Ok to leave detailed message on voice mail for today's visit only Yes, phone # [phone]

## 2019-06-24 NOTE — PROGRESS NOTES
CC: Cough, sinus congestion    History:  Hong is here today with symptoms for 5 days. Started with sore throat, which has improved but still feels mildly irritated. Now for the past 2-3 days, has been having a productive, deep cough. Cough is day and night, but is still able to sleep somewhat well. Feels like it takes more effort to breath. Can hear crackles of mucous when he takes a deep breath. No significant facial pain, but has had a lot of yellow/green nasal discharge. Has been using Advil Cold & Sinus and Tylenol. No cough suppressant, Mucinex. No fever, sweats, chills. No known exposures or traveling.     PMH, MEDICATIONS, ALLERGIES, SOCIAL AND FAMILY HISTORY in [facility] and reviewed by me personally.      ROS negative other than the symptoms noted above in the HPI.        Examination   /64 (BP Location: Right arm, Patient Position: Sitting, Cuff Size: Adult Large)   Pulse 74   Temp 98.4  F (36.9  C) (Oral)   Wt 95.7 kg (211 lb)   SpO2 97%   BMI 27.84 kg/m         Constitutional: Sitting comfortably, in no acute distress. Vital signs noted  Eyes: pupils equal round reactive to light and accomodation, extra ocular movements intact  Ears: external canals and TMs free of abnormalities  Nose: patent, without mucosal abnormalities  Mouth and throat: without erythema or lesions of the mucosa  Neck:  no adenopathy, trachea midline and normal to palpation  Cardiovascular:  regular rate and rhythm, no murmurs, clicks, or gallops  Respiratory:  normal respiratory rate and rhythm, lungs clear to auscultation  SKIN: No jaundice/pallor/rash.   Psychiatric: mentation appears normal and affect normal/bright        A/P    ICD-10-CM    1. Acute bronchitis, unspecified organism J20.9    2. Acute non-recurrent sinusitis, unspecified location J01.90        DISCUSSION:  Reassured by normal vitals today. Hong has a mixture of sinus symptoms (nasal drainage) and bronchitis (productive cough). He was successfully  treated with Augmentin for sinus infection 3 months ago. Explained to Hong that after 5 days these symptoms are likely still viral. Would like him to do trial of Mucinex and consider Allegra as I suspect part of this is due to cat allergy now living with a cat. If sinus symptoms persist 2 more days, agreed he can contact me and would consider treating sinusitis with abx. However, explained to Hong that is it is mainly the coughing bothering him, I would not prescribe antibiotics until closer to 3 weeks, as cough is very likely to be viral.     Hong understands this. Warned him I will be out of office Thurs/Fri of this week, but he will keep me updated as needed.    follow up visit: As needed    [facility]

## 2019-06-27 ENCOUNTER — MYC MEDICAL ADVICE (OUTPATIENT)
Dept: FAMILY MEDICINE | Facility: CLINIC | Age: 29
End: 2019-06-27

## 2019-06-27 NOTE — TELEPHONE ENCOUNTER
Please review my chart message below for SRB  Respond to pt on my chart  [place]  [phone] (home)

## 2019-07-01 NOTE — TELEPHONE ENCOUNTER
Noted update from last week, which was addressed appropriately. Will wait for further updates from pt.

## 2019-07-02 ENCOUNTER — THERAPY VISIT (OUTPATIENT)
Dept: PHYSICAL THERAPY | Facility: CLINIC | Age: 29
End: 2019-07-02
Payer: COMMERCIAL

## 2019-07-02 DIAGNOSIS — M51.34 DEGENERATION OF THORACIC INTERVERTEBRAL DISC: ICD-10-CM

## 2019-07-02 PROCEDURE — 97112 NEUROMUSCULAR REEDUCATION: CPT | Mod: GP | Performed by: PHYSICAL THERAPIST

## 2019-07-02 PROCEDURE — 97110 THERAPEUTIC EXERCISES: CPT | Mod: GP | Performed by: PHYSICAL THERAPIST

## 2019-07-11 ENCOUNTER — THERAPY VISIT (OUTPATIENT)
Dept: PHYSICAL THERAPY | Facility: CLINIC | Age: 29
End: 2019-07-11
Payer: COMMERCIAL

## 2019-07-11 DIAGNOSIS — M51.34 DEGENERATION OF THORACIC INTERVERTEBRAL DISC: ICD-10-CM

## 2019-07-11 PROCEDURE — 97110 THERAPEUTIC EXERCISES: CPT | Mod: GP | Performed by: PHYSICAL THERAPY ASSISTANT

## 2019-07-11 PROCEDURE — 97112 NEUROMUSCULAR REEDUCATION: CPT | Mod: GP | Performed by: PHYSICAL THERAPY ASSISTANT

## 2019-07-11 NOTE — LETTER
LONI FRASER BURNSROSALBA PT  [address] [address]  [address]  [facility] [address]  [phone]    2019    Re: Hong Santiago   :   1990  MRN:  [id number]   REFERRING PHYSICIAN:   Bobo BARRY PT    Date of Initial Evaluation:  2019  Visits:  Rxs Used: 3  Reason for Referral:  Degeneration of thoracic intervertebral disc    DISCHARGE REPORT    Progress reporting period is from 19 to 19.       SUBJECTIVE  Subjective changes noted by patient:  .  Subjective: Patient came into the clinic today and had more flare ups with the exercises.  He is frustrated at the lack of progress as he does try to make the pain diminish with exercises that have been issued to him.    Current Pain level: 5/10(constant ).     Initial Pain level: 3/10.   Changes in function:  None  Adverse reaction to treatment or activity: treatment - increased pain  HPI  Oswestry Score: 38 %         OBJECTIVE  Changes noted in objective findings:  The objective findings below are from DOS .  Objective: Noted that patient has atrophied on the left side of the scapula and para spinal mm.  Patient has difficulty activating this area with tactile cueing.  Had patient perform the exercises today with visual aide for the feedback on the weakness.  Patient was able to see the lack of the mm tone in this area.MATEUSZ=38, STarT Back Screening Tool=HIGH     ASSESSMENT/PLAN  Updated problem list and treatment plan: Diagnosis 1:  Thoracic spine pain  Pain -  self management, education, directional preference exercise and home program  Decreased ROM/flexibility - manual therapy and therapeutic exercise  Decreased strength - therapeutic exercise and therapeutic activities  Impaired muscle performance - neuro re-education  Decreased function - therapeutic activities  STG/LTGs have been met or progress has been made towards goals:  None  Assessment of Progress: The patient's condition is unchanged.  Self Management  Plans:  Patient is independent in a home treatment program.  Patient is independent in self management of symptoms.                Re: Hong Santiago   :   1990    I have re-evaluated this patient and find that the nature, scope, duration and intensity of the therapy is appropriate for the medical condition of the patient.  Hong continues to require the following intervention to meet STG and LTG's:  PT intervention is no longer required to meet STG/LTG.    Recommendations:  This patient would benefit from further evaluation.    Thank you for your referral.    INQUIRIES  Therapist:  DENIA Sin, PT, Cert, MDT  [facility]  [address] [address] [address]  Phone: [phone]  Fax: [phone]

## 2019-07-17 PROBLEM — M51.34 DEGENERATION OF THORACIC INTERVERTEBRAL DISC: Status: RESOLVED | Noted: 2018-04-23 | Resolved: 2019-07-17

## 2019-07-17 NOTE — PROGRESS NOTES
Subjective:  HPI  Oswestry Score: 38 %                 Objective:  System    Physical Exam    General     ROS    Assessment/Plan:    DISCHARGE REPORT    Progress reporting period is from 6-17-19 to 7-11-19.       SUBJECTIVE  Subjective changes noted by patient:  .  Subjective: Patient came into the clinic today and had more flare ups with the exercises.  He is frustrated at the lack of progress as he does try to make the pain diminish with exercises that have been issued to him.    Current Pain level: 5/10(constant ).     Initial Pain level: 3/10.   Changes in function:  None  Adverse reaction to treatment or activity: treatment - increased pain    OBJECTIVE  Changes noted in objective findings:  The objective findings below are from DOS 7-11`-19.  Objective: Noted that patient has atrophied on the left side of the scapula and para spinal mm.  Patient has difficulty activating this area with tactile cueing.  Had patient perform the exercises today with visual aide for the feedback on the weakness.  Patient was able to see the lack of the mm tone in this area.MATEUSZ=38, STarT Back Screening Tool=HIGH     ASSESSMENT/PLAN  Updated problem list and treatment plan: Diagnosis 1:  Thoracic spine pain  Pain -  self management, education, directional preference exercise and home program  Decreased ROM/flexibility - manual therapy and therapeutic exercise  Decreased strength - therapeutic exercise and therapeutic activities  Impaired muscle performance - neuro re-education  Decreased function - therapeutic activities  STG/LTGs have been met or progress has been made towards goals:  None  Assessment of Progress: The patient's condition is unchanged.  Self Management Plans:  Patient is independent in a home treatment program.  Patient is independent in self management of symptoms.  I have re-evaluated this patient and find that the nature, scope, duration and intensity of the therapy is appropriate for the medical condition of the  patient.  Hong continues to require the following intervention to meet STG and LTG's:  PT intervention is no longer required to meet STG/LTG.    Recommendations:  This patient would benefit from further evaluation.    Please refer to the daily flowsheet for treatment today, total treatment time and time spent performing 1:1 timed codes.

## 2019-08-03 ENCOUNTER — OFFICE VISIT (OUTPATIENT)
Dept: FAMILY MEDICINE | Facility: CLINIC | Age: 29
End: 2019-08-03

## 2019-08-03 VITALS
HEART RATE: 73 BPM | HEIGHT: 73 IN | BODY MASS INDEX: 28.31 KG/M2 | SYSTOLIC BLOOD PRESSURE: 108 MMHG | OXYGEN SATURATION: 97 % | DIASTOLIC BLOOD PRESSURE: 66 MMHG | TEMPERATURE: 98 F | WEIGHT: 213.6 LBS
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
## 2019-08-03 PROCEDURE — 99213 OFFICE O/P EST LOW 20 MIN: CPT | Performed by: PHYSICIAN ASSISTANT

## 2019-08-03 RX ORDER — DOXYCYCLINE HYCLATE 100 MG
100 TABLET ORAL 2 TIMES DAILY
Qty: 28 TABLET | Refills: 0 | Status: SHIPPED | OUTPATIENT
Start: 2019-08-03 | End: 2019-09-10

## 2019-08-03 ASSESSMENT — MIFFLIN-ST. JEOR: SCORE: 1982.76

## 2019-08-03 NOTE — NURSING NOTE
Que is here today for a tick bite on his back.    Pre-visit Screening:  Immunizations:  up to date  Colonoscopy:  NA  Mammogram: NA  Asthma Action Test/Plan:  NA  PHQ9:  NA  GAD7:  NA  Questioned patient about current smoking habits Pt. has never smoked.  Ok to leave detailed message on voice mail for today's visit only Yes, phone # [phone]

## 2019-08-03 NOTE — PROGRESS NOTES
"Subjective     Hong Santiago is a 28 year old male who presents to clinic today for the following health issues:    HPI       Here with concerns about tick bite.  10 days ago noticed tick. Girlfriend pulled it off.  Thinks it was on him for a couple days.   Says tick was small. Now has target lesion on back. Denies fevers/joint pain.        Patient Active Problem List   Diagnosis     EDWARD (generalized anxiety disorder)     Health Care Home     ACP (advance care planning)     Depressive disorder     Other malaise     Past Surgical History:   Procedure Laterality Date     ORTHOPEDIC SURGERY Right 2010    bunion       Social History     Tobacco Use     Smoking status: Current Every Day Smoker     Types: Other     Smokeless tobacco: Former User     Types: Chew     Tobacco comment: e-cig   Substance Use Topics     Alcohol use: Yes     Comment: rare     Family History   Problem Relation Age of Onset     Depression Mother      Bipolar Disorder Brother      Alcoholism Brother            Current Outpatient Medications   Medication Sig Dispense Refill     clonazePAM (KLONOPIN) 2 MG tablet Take 1 tablet (2 mg) by mouth 2 times daily as needed for anxiety 90 tablet      fluvoxaMINE (LUVOX) 100 MG tablet Take 2 tablets (200 mg) by mouth At Bedtime       No Known Allergies  No lab results found.     BP Readings from Last 3 Encounters:   08/03/19 108/66   06/24/19 110/64   04/18/19 128/74    Wt Readings from Last 3 Encounters:   08/03/19 96.9 kg (213 lb 9.6 oz)   06/24/19 95.7 kg (211 lb)   04/18/19 95.6 kg (210 lb 12.8 oz)                        Review of Systems       ROS COMP: Constitutional, HEENT, cardiovascular, pulmonary, gi and gu systems are negative, except as otherwise noted.          Objective    /66 (BP Location: Left arm, Patient Position: Sitting, Cuff Size: Adult Large)   Pulse 73   Temp 98  F (36.7  C) (Oral)   Ht 1.854 m (6' 1\")   Wt 96.9 kg (213 lb 9.6 oz)   SpO2 97%   BMI 28.18 kg/m    Body mass " index is 28.18 kg/m .  Physical Exam               Erythematous raised 2x1.5 cm  Macule on back as above    Assessment & Plan     1. Tick bite, initial encounter  new  - doxycycline hyclate (VIBRA-TABS) 100 MG tablet; Take 1 tablet (100 mg) by mouth 2 times daily for 14 days  Dispense: 28 tablet; Refill: 0  2 weeks doxy prescribed.  I reviewed the patient information handout from Up To Date on this medication including side effects with the patient.  Probiotic encouraged  RTC with any joint pain or fevers after tx or any other signs of infection        NIKKY Varela  [facility]

## 2019-09-03 RX ORDER — CLONAZEPAM 0.5 MG/1
0.25 TABLET ORAL 2 TIMES DAILY
COMMUNITY
End: 2020-09-23

## 2019-09-03 RX ORDER — CLONAZEPAM 1 MG/1
1 TABLET ORAL AT BEDTIME
COMMUNITY
End: 2020-09-23

## 2019-09-04 ENCOUNTER — HOSPITAL ENCOUNTER (OUTPATIENT)
Dept: LAB | Facility: CLINIC | Age: 29
Discharge: HOME | End: 2019-09-04
Attending: NEUROLOGICAL SURGERY | Admitting: NEUROLOGICAL SURGERY
Payer: COMMERCIAL

## 2019-09-04 DIAGNOSIS — Z01.812 PRE-OPERATIVE LABORATORY EXAMINATION: ICD-10-CM

## 2019-09-04 LAB
MRSA DNA SPEC QL NAA+PROBE: NEGATIVE
SPECIMEN SOURCE: NORMAL

## 2019-09-04 PROCEDURE — 40000830 ZZHCL STATISTIC STAPH AUREUS METH RESIST SCREEN PCR: Performed by: NEUROLOGICAL SURGERY

## 2019-09-04 PROCEDURE — 40000829 ZZHCL STATISTIC STAPH AUREUS SUSCEPT SCREEN PCR: Performed by: NEUROLOGICAL SURGERY

## 2019-09-04 PROCEDURE — 87641 MR-STAPH DNA AMP PROBE: CPT | Performed by: NEUROLOGICAL SURGERY

## 2019-09-04 PROCEDURE — 87640 STAPH A DNA AMP PROBE: CPT | Performed by: NEUROLOGICAL SURGERY

## 2019-09-10 ENCOUNTER — TELEPHONE (OUTPATIENT)
Dept: FAMILY MEDICINE | Facility: CLINIC | Age: 29
End: 2019-09-10

## 2019-09-10 ENCOUNTER — OFFICE VISIT (OUTPATIENT)
Dept: FAMILY MEDICINE | Facility: CLINIC | Age: 29
End: 2019-09-10

## 2019-09-10 ENCOUNTER — RECORDS - HEALTHEAST (OUTPATIENT)
Dept: ADMINISTRATIVE | Facility: OTHER | Age: 29
End: 2019-09-10

## 2019-09-10 VITALS
DIASTOLIC BLOOD PRESSURE: 68 MMHG | BODY MASS INDEX: 27.43 KG/M2 | TEMPERATURE: 98.6 F | OXYGEN SATURATION: 97 % | HEART RATE: 67 BPM | HEIGHT: 73 IN | WEIGHT: 207 LBS | SYSTOLIC BLOOD PRESSURE: 112 MMHG

## 2019-09-10 DIAGNOSIS — M51.24 HERNIATED THORACIC DISC WITHOUT MYELOPATHY: ICD-10-CM

## 2019-09-10 DIAGNOSIS — F41.1 GAD (GENERALIZED ANXIETY DISORDER): ICD-10-CM

## 2019-09-10 DIAGNOSIS — F42.9 OBSESSIVE-COMPULSIVE DISORDER, UNSPECIFIED TYPE: ICD-10-CM

## 2019-09-10 DIAGNOSIS — R55 SYNCOPE, UNSPECIFIED SYNCOPE TYPE: ICD-10-CM

## 2019-09-10 DIAGNOSIS — Z01.818 PREOP GENERAL PHYSICAL EXAM: Primary | ICD-10-CM

## 2019-09-10 DIAGNOSIS — R94.31 ABNORMAL EKG: ICD-10-CM

## 2019-09-10 LAB
BUN SERPL-MCNC: 15 MG/DL (ref 7–25)
BUN/CREATININE RATIO: 13.9 (ref 6–22)
CALCIUM SERPL-MCNC: 10.2 MG/DL (ref 8.6–10.3)
CHLORIDE SERPLBLD-SCNC: 101 MMOL/L (ref 98–110)
CO2 SERPL-SCNC: 31.8 MMOL/L (ref 20–32)
CREAT SERPL-MCNC: 1.08 MG/DL (ref 0.7–1.18)
ERYTHROCYTE [DISTWIDTH] IN BLOOD BY AUTOMATED COUNT: 11.8 %
GLUCOSE SERPL-MCNC: 98 MG/DL (ref 60–99)
HCT VFR BLD AUTO: 47.5 % (ref 40–53)
HEMOGLOBIN: 16 G/DL (ref 13.3–17.7)
MCH RBC QN AUTO: 31.4 PG (ref 26–33)
MCHC RBC AUTO-ENTMCNC: 33.7 G/DL (ref 31–36)
MCV RBC AUTO: 93.2 FL (ref 78–100)
PLATELET COUNT - QUEST: 194 10^9/L (ref 150–375)
POTASSIUM SERPL-SCNC: 4.16 MMOL/L (ref 3.5–5.3)
RBC # BLD AUTO: 5.1 10*12/L (ref 4.4–5.9)
SODIUM SERPL-SCNC: 140.6 MMOL/L (ref 135–146)
WBC # BLD AUTO: 7.3 10*9/L (ref 4–11)

## 2019-09-10 PROCEDURE — 80048 BASIC METABOLIC PNL TOTAL CA: CPT | Performed by: PHYSICIAN ASSISTANT

## 2019-09-10 PROCEDURE — 93000 ELECTROCARDIOGRAM COMPLETE: CPT | Performed by: PHYSICIAN ASSISTANT

## 2019-09-10 PROCEDURE — 36415 COLL VENOUS BLD VENIPUNCTURE: CPT | Performed by: PHYSICIAN ASSISTANT

## 2019-09-10 PROCEDURE — 85027 COMPLETE CBC AUTOMATED: CPT | Performed by: PHYSICIAN ASSISTANT

## 2019-09-10 PROCEDURE — 86618 LYME DISEASE ANTIBODY: CPT | Mod: 90 | Performed by: PHYSICIAN ASSISTANT

## 2019-09-10 PROCEDURE — 99214 OFFICE O/P EST MOD 30 MIN: CPT | Performed by: PHYSICIAN ASSISTANT

## 2019-09-10 ASSESSMENT — MIFFLIN-ST. JEOR: SCORE: 1952.83

## 2019-09-10 NOTE — TELEPHONE ENCOUNTER
I called [facility] ( [phone] ) first available for the Echo is 9/20/2019 -- surgery is scheduled for 9/19/2019.     I called [facility] ( [phone] ) talked with Riya in scheduling. I was advised to fax the order with the demographics so she/they can enter in the outside order. Riya will call patient to make his appt after we fax the information. I asked Riya to call me back lena patient has been scheduled.     I left a message for patient to call me back RE echo    LYUBOV

## 2019-09-10 NOTE — PROGRESS NOTES
[facility]  [address]  [address]  [facility] [address]  [phone]  Dept: [phone]    PRE-OP EVALUATION:  Today's date: 9/10/2019    Hong Santiago (: 1990) presents for pre-operative evaluation assessment as requested by Dr. Falk .  He requires evaluation and anesthesia risk assessment prior to undergoing surgery/procedure for treatment of Thoracic Fusion T6-T8 .    Proposed Surgery/ Procedure: Thoracic Fusion T6-T8 .  Date of Surgery/ Procedure: 19  Time of Surgery/ Procedure: 10AM  Hospital[facility]urgical [facility]     Primary Physician: Osiris Faust  Type of Anesthesia Anticipated: to be determined    Patient has a Health Care Directive or Living Will:  NO    1. NO - Do you have a history of heart attack, stroke, stent, bypass or surgery on an artery in the head, neck, heart or legs?  2. NO - Do you ever have any pain or discomfort in your chest?  3. NO - Do you have a history of  Heart Failure?  4. NO - Are you troubled by shortness of breath when: walking on the level, up a slight hill or at night?   5. NO - Do you currently have a cold, bronchitis or other respiratory infection?  6. NO - Do you have a cough, shortness of breath or wheezing?  7. NO - Do you sometimes get pains in the calves of your legs when you walk?  8. NO - Do you or anyone in your family have previous history of blood clots?  9. NO - Do you or does anyone in your family have a serious bleeding problem such as prolonged bleeding following surgeries or cuts?  10. NO - Have you ever had problems with anemia or been told to take iron pills?  11. NO - Have you had any abnormal blood loss such as black, tarry or bloody stools?  12. NO - Have you ever had a blood transfusion?  13. NO - Have you or any of your relatives ever had problems with anesthesia?  14. NO - Do you have sleep apnea, excessive snoring or daytime drowsiness?  15. NO - Do you have any prosthetic heart  valves?  16. NO - Do you have prosthetic joints?      HPI:     HPI related to upcoming procedure:   Years of back Pain - Herniated  T6-T8          11:30 pm a couple weeks ago - came in from bathroom - felt lightheaded then had syncopal episode  He thinks he lost consciousness for < 1 min. No head trauma. Has been working with his psychiatrist to dec. His benzodiazepines. He doesn't think he accidentally took too many. Had no SOB or CP with the episode.  Has not had any sx since this time.   Luvox dose has gone up.    He was treated for Lyme disease several weeks ago.           See problem list for active medical problems.  Problems all longstanding and stable, except as noted/documented.  See ROS for pertinent symptoms related to these conditions.      MEDICAL HISTORY:     Patient Active Problem List    Diagnosis Date Noted     Depressive disorder 04/18/2019     Priority: Medium     Other malaise 04/18/2019     Priority: Medium     EDWARD (generalized anxiety disorder) 04/23/2018     Priority: Medium     Health Care Home 08/16/2018     Priority: Low     ACP (advance care planning) 08/16/2018     Priority: Low      Past Medical History:   Diagnosis Date     Anxiety      Past Surgical History:   Procedure Laterality Date     ORTHOPEDIC SURGERY Right 2010    bunion     Current Outpatient Medications   Medication Sig Dispense Refill     clonazePAM (KLONOPIN) 0.25 mg TABS half-tab Take 0.25 mg by mouth 2 times daily       clonazePAM (KLONOPIN) 1 MG tablet Take 1 mg by mouth daily       fluvoxaMINE (LUVOX) 100 MG tablet Take 300 mg by mouth At Bedtime        HEMP OIL OR EXTRACT OR OTHER CBD CANNABINOID, NOT MEDICAL CANNABIS,        OTC products: none    No Known Allergies   Latex Allergy: NO    Social History     Tobacco Use     Smoking status: Current Some Day Smoker     Types: Other     Smokeless tobacco: Former User     Types: Chew     Tobacco comment: e-cig   Substance Use Topics     Alcohol use: Yes     Comment: 2  "drinks per week     History   Drug Use No       REVIEW OF SYSTEMS:   Constitutional, HEENT, cardiovascular, pulmonary, gi and gu systems are negative, except as otherwise noted.    EXAM:   /68 (BP Location: Right arm, Patient Position: Sitting, Cuff Size: Adult Large)   Pulse 67   Temp 98.6  F (37  C) (Oral)   Ht 1.854 m (6' 1\")   Wt 93.9 kg (207 lb)   SpO2 97%   BMI 27.31 kg/m          GENERAL APPEARANCE: healthy, alert and no distress     EYES: EOMI,  PERRL     HENT: ear canals and TM's normal and nose and mouth without ulcers or lesions     NECK: no adenopathy, no asymmetry, masses, or scars and thyroid normal to palpation     RESP: lungs clear to auscultation - no rales, rhonchi or wheezes     CV: regular rates and rhythm, normal S1 S2, no S3 or S4 and no murmur, click or rub     ABDOMEN:  soft, nontender, no HSM or masses and bowel sounds normal     MS: extremities normal- no gross deformities noted, no evidence of inflammation in joints, FROM in all extremities.     SKIN: no suspicious lesions or rashes     NEURO: Normal strength and tone, sensory exam grossly normal, mentation intact and speech normal     PSYCH: mentation appears normal. and affect normal/bright     LYMPHATICS: No cervical adenopathy      DIAGNOSTICS:     Component      Latest Ref Rng & Units 9/10/2019   WBC      4.0 - 11 10*9/L 7.3   RBC Count      4.4 - 5.9 10*12/L 5.10   Hemoglobin      13.3 - 17.7 g/dL 16.0   Hematocrit      40.0 - 53.0 % 47.5   MCV      78 - 100 fL 93.2   MCH      26 - 33 pg 31.4   MCHC      31 - 36 g/dL 33.7   RDW      % 11.8   Platelet Count      150 - 375 10:9/L 194   Carbon Dioxide      20 - 32 mmol/L 31.8   Creatinine      0.70 - 1.18 mg/dL 1.08   Glucose      60 - 99 mg/dL 98   Sodium      135 - 146 mmol/L 140.6   Potassium      3.5 - 5.3 mmol/L 4.16   Chloride      98 - 110 mmol/L 101.0   Urea Nitrogen      7 - 25 mg/dL 15   Calcium      8.6 - 10.3 mg/dL 10.2   BUN/Creatinine Ratio      6 - 22 13.9 "       EKG: Possible LVH, Bradycardia rate 57            ECHO COMPLETED ON 9/16    Normal left ventricular size and systolic function.The calculated left   ventricular ejection fraction is 60%. This represents a normal ejection   fraction. The left ventricular wall thickness is normal. E/e' < 8,   suggesting normal LV filling pressure.Left ventricular diastolic function   is normal.    Left atrial volume is mildly increased.    Normal right ventricular size and systolic function.    No hemodynamically significant valvular heart abnormalities.    No previous study for comparison.    IMPRESSION:   Reason for surgery/procedure: spinal fusion  Diagnosis/reason for consult:  Preoperative clearance      The proposed surgical procedure is considered INTERMEDIATE risk.    REVISED CARDIAC RISK INDEX  The patient has the following serious cardiovascular risks for perioperative complications such as (MI, PE, VFib and 3  AV Block):  No serious cardiac risks  INTERPRETATION: 0 risks: Class I (very low risk - 0.4% complication rate)    The patient has the following additional risks for perioperative complications:  No identified additional risks      ICD-10-CM    1. Preop general physical exam Z01.818 EKG 12-lead complete w/read - Clinics     Echocardiogram Complete   2. Herniated thoracic disc without myelopathy M51.24    3. Syncope, unspecified syncope type R55 Basic Metabolic Panel (BFP)     VENOUS COLLECTION     HEMOGRAM/PLATELET (BFP)     EKG 12-lead complete w/read - Clinics     [place] Antibodies Total ([facility])     Echocardiogram Complete   4. EDWARD (generalized anxiety disorder) F41.1    5. Obsessive-compulsive disorder, unspecified type F42.9 Basic Metabolic Panel (BFP)     VENOUS COLLECTION   6. Abnormal EKG R94.31 Echocardiogram Complete       RECOMMENDATIONS:       ADDENDUM   9/18/2019    Pt has completed echo, labs - all Normal.    Pt medically cleared for surgery without further workup    R    Signed Electronically by:  NIKKY Varela    Copy of this evaluation report is provided to requesting physician.    [facility] Preop Guidelines    Revised Cardiac Risk Index

## 2019-09-10 NOTE — NURSING NOTE
Chief Complaint   Patient presents with     Pre-Op Exam     Throacic Fusion  T6-T8, 9/19/19, [facility] in [place], Dr. Bobo Mooney

## 2019-09-11 NOTE — TELEPHONE ENCOUNTER
Patient is scheduled for his ECHO 9/16/2019 with [facility] @ 12:45 [facility] location ( [address] [place] [id number] )     LYUBOV

## 2019-09-12 LAB — LYME SCREEN IGG AND IGM: <0.9 INDEX

## 2019-09-16 ENCOUNTER — HOSPITAL ENCOUNTER (OUTPATIENT)
Dept: CARDIOLOGY | Facility: CLINIC | Age: 29
Discharge: HOME | End: 2019-09-16

## 2019-09-16 DIAGNOSIS — R55 SYNCOPE: ICD-10-CM

## 2019-09-16 LAB
AORTIC ROOT: 3.5 CM
AORTIC VALVE MEAN VELOCITY: 88.6 CM/S
AR DECEL SLOPE: 5690 MM/S2
AR PEAK VELOCITY: 446 CM/S
ASCENDING AORTA: 3.4 CM
AV DIMENSIONLESS INDEX VTI: 0.7
AV MEAN GRADIENT: 4 MMHG
AV PEAK GRADIENT: 7.2 MMHG
AV REGURGITANT PEAK GRADIENT: 79.6 MMHG
AV REGURGITATION PRESSURE HALF TIME: 275 MS
AV VALVE AREA: 3.8 CM2
AV VELOCITY RATIO: 0.7
BSA FOR ECHO PROCEDURE: 1.74 M2
CV BLOOD PRESSURE: ABNORMAL MMHG
CV ECHO HEIGHT: 68 IN
CV ECHO WEIGHT: 140 LBS
DOP CALC AO PEAK VEL: 134 CM/S
DOP CALC AO VTI: 22.9 CM
DOP CALC LVOT AREA: 5.72 CM2
DOP CALC LVOT DIAMETER: 2.7 CM
DOP CALC LVOT PEAK VEL: 91 CM/S
DOP CALC LVOT STROKE VOLUME: 87.6 CM3
DOP CALC MV VTI: 19.2 CM
DOP CALCLVOT PEAK VEL VTI: 15.3 CM
EJECTION FRACTION: 60 % (ref 55–75)
INTERVENTRICULAR SEPTUM IN END DIASTOLE: 0.8 CM (ref 0.6–1)
IVS/PW RATIO: 1
LA AREA 1: 20 CM2
LA AREA 2: 20.3 CM2
LEFT ATRIUM LENGTH: 5.24 CM
LEFT ATRIUM SIZE: 3.9 CM
LEFT ATRIUM TO AORTIC ROOT RATIO: 1.14 NO UNITS
LEFT ATRIUM VOLUME INDEX: 37.8 ML/M2
LEFT ATRIUM VOLUME: 65.9 ML
LEFT VENTRICLE DIASTOLIC VOLUME INDEX: 96.6 CM3/M2 (ref 34–74)
LEFT VENTRICLE DIASTOLIC VOLUME: 168 CM3 (ref 62–150)
LEFT VENTRICLE MASS INDEX: 116.5 G/M2
LEFT VENTRICLE SYSTOLIC VOLUME INDEX: 38.5 CM3/M2 (ref 11–31)
LEFT VENTRICLE SYSTOLIC VOLUME: 67 CM3 (ref 21–61)
LEFT VENTRICULAR INTERNAL DIMENSION IN DIASTOLE: 6.3 CM (ref 4.2–5.8)
LEFT VENTRICULAR MASS: 202.8 G
LEFT VENTRICULAR OUTFLOW TRACT MEAN GRADIENT: 2 MMHG
LEFT VENTRICULAR OUTFLOW TRACT MEAN VELOCITY: 60.9 CM/S
LEFT VENTRICULAR OUTFLOW TRACT PEAK GRADIENT: 3 MMHG
LEFT VENTRICULAR POSTERIOR WALL IN END DIASTOLE: 0.8 CM (ref 0.6–1)
LV STROKE VOLUME INDEX: 50.3 ML/M2
MITRAL VALVE E/A RATIO: 2
MITRAL VALVE MEAN INFLOW VELOCITY: 40.1 CM/S
MITRAL VALVE PEAK VELOCITY: 89.2 CM/S
MV AREA VTI: 4.56 CM2
MV AVERAGE E/E' RATIO: 3.8 CM/S
MV DECELERATION TIME: 187 MS
MV E'TISSUE VEL-LAT: 21.2 CM/S
MV E'TISSUE VEL-MED: 17.7 CM/S
MV LATERAL E/E' RATIO: 3.5
MV MEAN GRADIENT: 1 MMHG
MV MEDIAL E/E' RATIO: 4.2
MV PEAK A VELOCITY: 37.8 CM/S
MV PEAK E VELOCITY: 74.4 CM/S
MV PEAK GRADIENT: 3.2 MMHG
MV VALVE AREA BY CONTINUITY EQUATION: 4.6 CM2
NUC REST DIASTOLIC VOLUME INDEX: 2240 LBS
NUC REST SYSTOLIC VOLUME INDEX: 68 IN
PR MAX PG: 2 MMHG
PR PEAK VELOCITY: 71.7 CM/S
PV PEAK GRADIENT: 4.4 MMHG
PV VMAX: 105 CM/S
TRICUSPID VALVE ANULAR PLANE SYSTOLIC EXCURSION: 3.3 CM

## 2019-09-16 ASSESSMENT — MIFFLIN-ST. JEOR: SCORE: 1559.54

## 2019-09-17 ENCOUNTER — MYC MEDICAL ADVICE (OUTPATIENT)
Dept: FAMILY MEDICINE | Facility: CLINIC | Age: 29
End: 2019-09-17

## 2019-09-18 ENCOUNTER — HOSPITAL ENCOUNTER (OUTPATIENT)
Dept: LAB | Facility: CLINIC | Age: 29
Discharge: HOME | End: 2019-09-18
Attending: PHYSICIAN ASSISTANT | Admitting: NEUROLOGICAL SURGERY
Payer: COMMERCIAL

## 2019-09-18 DIAGNOSIS — Z01.812 PRE-OPERATIVE LABORATORY EXAMINATION: ICD-10-CM

## 2019-09-18 LAB
ABO + RH BLD: NORMAL
ABO + RH BLD: NORMAL
APTT PPP: 30 SEC (ref 22–37)
BLD GP AB SCN SERPL QL: NORMAL
BLOOD BANK CMNT PATIENT-IMP: NORMAL
INR PPP: 1.06 (ref 0.86–1.14)
SPECIMEN EXP DATE BLD: NORMAL

## 2019-09-18 PROCEDURE — 85730 THROMBOPLASTIN TIME PARTIAL: CPT | Performed by: NEUROLOGICAL SURGERY

## 2019-09-18 PROCEDURE — 85610 PROTHROMBIN TIME: CPT | Performed by: NEUROLOGICAL SURGERY

## 2019-09-18 PROCEDURE — 86901 BLOOD TYPING SEROLOGIC RH(D): CPT | Performed by: NEUROLOGICAL SURGERY

## 2019-09-18 PROCEDURE — 86900 BLOOD TYPING SEROLOGIC ABO: CPT | Performed by: NEUROLOGICAL SURGERY

## 2019-09-18 PROCEDURE — 86850 RBC ANTIBODY SCREEN: CPT | Performed by: NEUROLOGICAL SURGERY

## 2019-09-18 PROCEDURE — 36415 COLL VENOUS BLD VENIPUNCTURE: CPT | Performed by: NEUROLOGICAL SURGERY

## 2019-09-19 ENCOUNTER — HOSPITAL ENCOUNTER (INPATIENT)
Facility: CLINIC | Age: 29
LOS: 2 days | Discharge: HOME | End: 2019-09-21
Attending: NEUROLOGICAL SURGERY | Admitting: NEUROLOGICAL SURGERY
Payer: COMMERCIAL

## 2019-09-19 ENCOUNTER — APPOINTMENT (OUTPATIENT)
Dept: GENERAL RADIOLOGY | Facility: CLINIC | Age: 29
End: 2019-09-19
Attending: NEUROLOGICAL SURGERY
Payer: COMMERCIAL

## 2019-09-19 ENCOUNTER — ANESTHESIA EVENT (OUTPATIENT)
Dept: SURGERY | Facility: CLINIC | Age: 29
End: 2019-09-19
Payer: COMMERCIAL

## 2019-09-19 ENCOUNTER — ANESTHESIA (OUTPATIENT)
Dept: SURGERY | Facility: CLINIC | Age: 29
End: 2019-09-19
Payer: COMMERCIAL

## 2019-09-19 ENCOUNTER — APPOINTMENT (OUTPATIENT)
Dept: CT IMAGING | Facility: CLINIC | Age: 29
End: 2019-09-19
Attending: PHYSICIAN ASSISTANT
Payer: COMMERCIAL

## 2019-09-19 DIAGNOSIS — Z98.1 S/P FUSION OF THORACIC SPINE: Primary | ICD-10-CM

## 2019-09-19 PROCEDURE — 25000132 ZZH RX MED GY IP 250 OP 250 PS 637: Performed by: ANESTHESIOLOGY

## 2019-09-19 PROCEDURE — 25000125 ZZHC RX 250: Performed by: NURSE ANESTHETIST, CERTIFIED REGISTERED

## 2019-09-19 PROCEDURE — 25800030 ZZH RX IP 258 OP 636: Performed by: ANESTHESIOLOGY

## 2019-09-19 PROCEDURE — 25000128 H RX IP 250 OP 636: Performed by: NEUROLOGICAL SURGERY

## 2019-09-19 PROCEDURE — 25000128 H RX IP 250 OP 636: Performed by: NURSE ANESTHETIST, CERTIFIED REGISTERED

## 2019-09-19 PROCEDURE — 4A11X4G MONITORING OF PERIPHERAL NERVOUS ELECTRICAL ACTIVITY, INTRAOPERATIVE, EXTERNAL APPROACH: ICD-10-PCS | Performed by: NEUROLOGICAL SURGERY

## 2019-09-19 PROCEDURE — 37000009 ZZH ANESTHESIA TECHNICAL FEE, EACH ADDTL 15 MIN: Performed by: NEUROLOGICAL SURGERY

## 2019-09-19 PROCEDURE — 25000132 ZZH RX MED GY IP 250 OP 250 PS 637: Performed by: PHYSICIAN ASSISTANT

## 2019-09-19 PROCEDURE — 25000128 H RX IP 250 OP 636: Performed by: PHYSICIAN ASSISTANT

## 2019-09-19 PROCEDURE — C1763 CONN TISS, NON-HUMAN: HCPCS | Performed by: NEUROLOGICAL SURGERY

## 2019-09-19 PROCEDURE — 71250 CT THORAX DX C-: CPT

## 2019-09-19 PROCEDURE — 3E0R3BZ INTRODUCTION OF ANESTHETIC AGENT INTO SPINAL CANAL, PERCUTANEOUS APPROACH: ICD-10-PCS | Performed by: NEUROLOGICAL SURGERY

## 2019-09-19 PROCEDURE — 71000012 ZZH RECOVERY PHASE 1 LEVEL 1 FIRST HR: Performed by: NEUROLOGICAL SURGERY

## 2019-09-19 PROCEDURE — 0RB90ZZ EXCISION OF THORACIC VERTEBRAL DISC, OPEN APPROACH: ICD-10-PCS | Performed by: NEUROLOGICAL SURGERY

## 2019-09-19 PROCEDURE — 25000132 ZZH RX MED GY IP 250 OP 250 PS 637: Performed by: NEUROLOGICAL SURGERY

## 2019-09-19 PROCEDURE — 37000008 ZZH ANESTHESIA TECHNICAL FEE, 1ST 30 MIN: Performed by: NEUROLOGICAL SURGERY

## 2019-09-19 PROCEDURE — 27210794 ZZH OR GENERAL SUPPLY STERILE: Performed by: NEUROLOGICAL SURGERY

## 2019-09-19 PROCEDURE — 25000128 H RX IP 250 OP 636: Performed by: ANESTHESIOLOGY

## 2019-09-19 PROCEDURE — 27211024 ZZHC OR SUPPLY OTHER OPNP: Performed by: NEUROLOGICAL SURGERY

## 2019-09-19 PROCEDURE — 12000000 ZZH R&B MED SURG/OB

## 2019-09-19 PROCEDURE — 40000277 XR SURGERY CARM FLUORO LESS THAN 5 MIN W STILLS: Mod: TC

## 2019-09-19 PROCEDURE — 93010 ELECTROCARDIOGRAM REPORT: CPT | Performed by: INTERNAL MEDICINE

## 2019-09-19 PROCEDURE — 40000306 ZZH STATISTIC PRE PROC ASSESS II: Performed by: NEUROLOGICAL SURGERY

## 2019-09-19 PROCEDURE — 36000071 ZZH SURGERY LEVEL 5 W FLUORO 1ST 30 MIN: Performed by: NEUROLOGICAL SURGERY

## 2019-09-19 PROCEDURE — 3E0R33Z INTRODUCTION OF ANTI-INFLAMMATORY INTO SPINAL CANAL, PERCUTANEOUS APPROACH: ICD-10-PCS | Performed by: NEUROLOGICAL SURGERY

## 2019-09-19 PROCEDURE — 71000013 ZZH RECOVERY PHASE 1 LEVEL 1 EA ADDTL HR: Performed by: NEUROLOGICAL SURGERY

## 2019-09-19 PROCEDURE — 40000986 XR CHEST PORT 1 VW

## 2019-09-19 PROCEDURE — 36000069 ZZH SURGERY LEVEL 5 EA 15 ADDTL MIN: Performed by: NEUROLOGICAL SURGERY

## 2019-09-19 PROCEDURE — 01N80ZZ RELEASE THORACIC NERVE, OPEN APPROACH: ICD-10-PCS | Performed by: NEUROLOGICAL SURGERY

## 2019-09-19 PROCEDURE — 0RG70K1 FUSION OF 2 TO 7 THORACIC VERTEBRAL JOINTS WITH NONAUTOLOGOUS TISSUE SUBSTITUTE, POSTERIOR APPROACH, POSTERIOR COLUMN, OPEN APPROACH: ICD-10-PCS | Performed by: NEUROLOGICAL SURGERY

## 2019-09-19 PROCEDURE — 95940 IONM IN OPERATNG ROOM 15 MIN: CPT | Performed by: NEUROLOGICAL SURGERY

## 2019-09-19 PROCEDURE — 25800030 ZZH RX IP 258 OP 636: Performed by: NURSE ANESTHETIST, CERTIFIED REGISTERED

## 2019-09-19 PROCEDURE — 25000125 ZZHC RX 250: Performed by: NEUROLOGICAL SURGERY

## 2019-09-19 PROCEDURE — 0RG70AJ FUSION OF 2 TO 7 THORACIC VERTEBRAL JOINTS WITH INTERBODY FUSION DEVICE, POSTERIOR APPROACH, ANTERIOR COLUMN, OPEN APPROACH: ICD-10-PCS | Performed by: NEUROLOGICAL SURGERY

## 2019-09-19 PROCEDURE — 25000132 ZZH RX MED GY IP 250 OP 250 PS 637: Performed by: INTERNAL MEDICINE

## 2019-09-19 PROCEDURE — C1713 ANCHOR/SCREW BN/BN,TIS/BN: HCPCS | Performed by: NEUROLOGICAL SURGERY

## 2019-09-19 RX ORDER — SODIUM CHLORIDE, SODIUM LACTATE, POTASSIUM CHLORIDE, CALCIUM CHLORIDE 600; 310; 30; 20 MG/100ML; MG/100ML; MG/100ML; MG/100ML
INJECTION, SOLUTION INTRAVENOUS CONTINUOUS
Status: DISCONTINUED | OUTPATIENT
Start: 2019-09-19 | End: 2019-09-19 | Stop reason: HOSPADM

## 2019-09-19 RX ORDER — DEXAMETHASONE SODIUM PHOSPHATE 4 MG/ML
4 INJECTION, SOLUTION INTRA-ARTICULAR; INTRALESIONAL; INTRAMUSCULAR; INTRAVENOUS; SOFT TISSUE
Status: DISCONTINUED | OUTPATIENT
Start: 2019-09-19 | End: 2019-09-19 | Stop reason: HOSPADM

## 2019-09-19 RX ORDER — METOCLOPRAMIDE HYDROCHLORIDE 5 MG/ML
10 INJECTION INTRAMUSCULAR; INTRAVENOUS EVERY 6 HOURS PRN
Status: DISCONTINUED | OUTPATIENT
Start: 2019-09-19 | End: 2019-09-21 | Stop reason: HOSPADM

## 2019-09-19 RX ORDER — BUPIVACAINE HYDROCHLORIDE 7.5 MG/ML
INJECTION, SOLUTION EPIDURAL; RETROBULBAR PRN
Status: DISCONTINUED | OUTPATIENT
Start: 2019-09-19 | End: 2019-09-19 | Stop reason: HOSPADM

## 2019-09-19 RX ORDER — CYCLOBENZAPRINE HCL 10 MG
10 TABLET ORAL 3 TIMES DAILY PRN
Qty: 60 TABLET | Refills: 3 | Status: SHIPPED | OUTPATIENT
Start: 2019-09-19 | End: 2020-09-23

## 2019-09-19 RX ORDER — CEFAZOLIN SODIUM 1 G/3ML
1 INJECTION, POWDER, FOR SOLUTION INTRAMUSCULAR; INTRAVENOUS SEE ADMIN INSTRUCTIONS
Status: DISCONTINUED | OUTPATIENT
Start: 2019-09-19 | End: 2019-09-19 | Stop reason: HOSPADM

## 2019-09-19 RX ORDER — METOCLOPRAMIDE 5 MG/1
10 TABLET ORAL EVERY 6 HOURS PRN
Status: DISCONTINUED | OUTPATIENT
Start: 2019-09-19 | End: 2019-09-21 | Stop reason: HOSPADM

## 2019-09-19 RX ORDER — ONDANSETRON 2 MG/ML
4 INJECTION INTRAMUSCULAR; INTRAVENOUS EVERY 30 MIN PRN
Status: DISCONTINUED | OUTPATIENT
Start: 2019-09-19 | End: 2019-09-19 | Stop reason: HOSPADM

## 2019-09-19 RX ORDER — SODIUM CHLORIDE AND POTASSIUM CHLORIDE 150; 900 MG/100ML; MG/100ML
INJECTION, SOLUTION INTRAVENOUS CONTINUOUS
Status: DISCONTINUED | OUTPATIENT
Start: 2019-09-19 | End: 2019-09-20

## 2019-09-19 RX ORDER — HYDRALAZINE HYDROCHLORIDE 20 MG/ML
2.5-5 INJECTION INTRAMUSCULAR; INTRAVENOUS EVERY 10 MIN PRN
Status: DISCONTINUED | OUTPATIENT
Start: 2019-09-19 | End: 2019-09-19 | Stop reason: HOSPADM

## 2019-09-19 RX ORDER — HYDROMORPHONE HYDROCHLORIDE 1 MG/ML
.3-.5 INJECTION, SOLUTION INTRAMUSCULAR; INTRAVENOUS; SUBCUTANEOUS
Status: DISCONTINUED | OUTPATIENT
Start: 2019-09-19 | End: 2019-09-21

## 2019-09-19 RX ORDER — ACETAMINOPHEN 325 MG/1
975 TABLET ORAL ONCE
Status: COMPLETED | OUTPATIENT
Start: 2019-09-19 | End: 2019-09-19

## 2019-09-19 RX ORDER — CEFAZOLIN SODIUM 2 G/100ML
2 INJECTION, SOLUTION INTRAVENOUS
Status: COMPLETED | OUTPATIENT
Start: 2019-09-19 | End: 2019-09-19

## 2019-09-19 RX ORDER — LIDOCAINE HYDROCHLORIDE 10 MG/ML
INJECTION, SOLUTION INFILTRATION; PERINEURAL PRN
Status: DISCONTINUED | OUTPATIENT
Start: 2019-09-19 | End: 2019-09-19

## 2019-09-19 RX ORDER — GLYCOPYRROLATE 0.2 MG/ML
INJECTION, SOLUTION INTRAMUSCULAR; INTRAVENOUS PRN
Status: DISCONTINUED | OUTPATIENT
Start: 2019-09-19 | End: 2019-09-19

## 2019-09-19 RX ORDER — ONDANSETRON 2 MG/ML
INJECTION INTRAMUSCULAR; INTRAVENOUS PRN
Status: DISCONTINUED | OUTPATIENT
Start: 2019-09-19 | End: 2019-09-19

## 2019-09-19 RX ORDER — FLUVOXAMINE MALEATE 100 MG
300 TABLET ORAL AT BEDTIME
Status: DISCONTINUED | OUTPATIENT
Start: 2019-09-19 | End: 2019-09-21 | Stop reason: HOSPADM

## 2019-09-19 RX ORDER — AMOXICILLIN 250 MG
1 CAPSULE ORAL 2 TIMES DAILY
Status: DISCONTINUED | OUTPATIENT
Start: 2019-09-19 | End: 2019-09-21 | Stop reason: HOSPADM

## 2019-09-19 RX ORDER — HYDROXYZINE HYDROCHLORIDE 25 MG/1
25 TABLET, FILM COATED ORAL EVERY 6 HOURS PRN
Status: DISCONTINUED | OUTPATIENT
Start: 2019-09-19 | End: 2019-09-21 | Stop reason: HOSPADM

## 2019-09-19 RX ORDER — HYDROXYZINE HYDROCHLORIDE 25 MG/1
25 TABLET, FILM COATED ORAL EVERY 6 HOURS PRN
Qty: 60 TABLET | Refills: 3 | Status: SHIPPED | OUTPATIENT
Start: 2019-09-19 | End: 2020-09-23

## 2019-09-19 RX ORDER — FENTANYL CITRATE 50 UG/ML
INJECTION, SOLUTION INTRAMUSCULAR; INTRAVENOUS PRN
Status: DISCONTINUED | OUTPATIENT
Start: 2019-09-19 | End: 2019-09-19

## 2019-09-19 RX ORDER — CLONAZEPAM 0.5 MG/1
1 TABLET ORAL AT BEDTIME
Status: DISCONTINUED | OUTPATIENT
Start: 2019-09-19 | End: 2019-09-21 | Stop reason: HOSPADM

## 2019-09-19 RX ORDER — LIDOCAINE 40 MG/G
CREAM TOPICAL
Status: DISCONTINUED | OUTPATIENT
Start: 2019-09-19 | End: 2019-09-21 | Stop reason: HOSPADM

## 2019-09-19 RX ORDER — CEFAZOLIN SODIUM 1 G/50ML
1 INJECTION, SOLUTION INTRAVENOUS EVERY 8 HOURS
Status: COMPLETED | OUTPATIENT
Start: 2019-09-19 | End: 2019-09-20

## 2019-09-19 RX ORDER — AMOXICILLIN 250 MG
2 CAPSULE ORAL 2 TIMES DAILY
Status: DISCONTINUED | OUTPATIENT
Start: 2019-09-19 | End: 2019-09-21 | Stop reason: HOSPADM

## 2019-09-19 RX ORDER — FENTANYL CITRATE 50 UG/ML
50 INJECTION, SOLUTION INTRAMUSCULAR; INTRAVENOUS ONCE
Status: COMPLETED | OUTPATIENT
Start: 2019-09-19 | End: 2019-09-19

## 2019-09-19 RX ORDER — CLONAZEPAM 0.25 MG/1
0.25 TABLET, ORALLY DISINTEGRATING ORAL 2 TIMES DAILY
Status: DISCONTINUED | OUTPATIENT
Start: 2019-09-19 | End: 2019-09-21 | Stop reason: HOSPADM

## 2019-09-19 RX ORDER — PROPOFOL 10 MG/ML
INJECTION, EMULSION INTRAVENOUS CONTINUOUS PRN
Status: DISCONTINUED | OUTPATIENT
Start: 2019-09-19 | End: 2019-09-19

## 2019-09-19 RX ORDER — METOPROLOL TARTRATE 1 MG/ML
1-2 INJECTION, SOLUTION INTRAVENOUS EVERY 5 MIN PRN
Status: DISCONTINUED | OUTPATIENT
Start: 2019-09-19 | End: 2019-09-19 | Stop reason: HOSPADM

## 2019-09-19 RX ORDER — HYDROMORPHONE HYDROCHLORIDE 1 MG/ML
.3-.5 INJECTION, SOLUTION INTRAMUSCULAR; INTRAVENOUS; SUBCUTANEOUS EVERY 5 MIN PRN
Status: DISCONTINUED | OUTPATIENT
Start: 2019-09-19 | End: 2019-09-19 | Stop reason: HOSPADM

## 2019-09-19 RX ORDER — ONDANSETRON 4 MG/1
4 TABLET, ORALLY DISINTEGRATING ORAL EVERY 30 MIN PRN
Status: DISCONTINUED | OUTPATIENT
Start: 2019-09-19 | End: 2019-09-19 | Stop reason: HOSPADM

## 2019-09-19 RX ORDER — DEXAMETHASONE SODIUM PHOSPHATE 4 MG/ML
INJECTION, SOLUTION INTRA-ARTICULAR; INTRALESIONAL; INTRAMUSCULAR; INTRAVENOUS; SOFT TISSUE PRN
Status: DISCONTINUED | OUTPATIENT
Start: 2019-09-19 | End: 2019-09-19

## 2019-09-19 RX ORDER — MEPERIDINE HYDROCHLORIDE 50 MG/ML
12.5 INJECTION INTRAMUSCULAR; INTRAVENOUS; SUBCUTANEOUS EVERY 5 MIN PRN
Status: DISCONTINUED | OUTPATIENT
Start: 2019-09-19 | End: 2019-09-19 | Stop reason: HOSPADM

## 2019-09-19 RX ORDER — NALOXONE HYDROCHLORIDE 0.4 MG/ML
.1-.4 INJECTION, SOLUTION INTRAMUSCULAR; INTRAVENOUS; SUBCUTANEOUS
Status: ACTIVE | OUTPATIENT
Start: 2019-09-19 | End: 2019-09-20

## 2019-09-19 RX ORDER — ONDANSETRON 2 MG/ML
4 INJECTION INTRAMUSCULAR; INTRAVENOUS EVERY 6 HOURS PRN
Status: DISCONTINUED | OUTPATIENT
Start: 2019-09-19 | End: 2019-09-21 | Stop reason: HOSPADM

## 2019-09-19 RX ORDER — ACETAMINOPHEN 325 MG/1
650 TABLET ORAL EVERY 4 HOURS PRN
Status: DISCONTINUED | OUTPATIENT
Start: 2019-09-22 | End: 2019-09-21 | Stop reason: HOSPADM

## 2019-09-19 RX ORDER — DIMENHYDRINATE 50 MG/ML
25 INJECTION, SOLUTION INTRAMUSCULAR; INTRAVENOUS
Status: DISCONTINUED | OUTPATIENT
Start: 2019-09-19 | End: 2019-09-19 | Stop reason: HOSPADM

## 2019-09-19 RX ORDER — NALOXONE HYDROCHLORIDE 0.4 MG/ML
.1-.4 INJECTION, SOLUTION INTRAMUSCULAR; INTRAVENOUS; SUBCUTANEOUS
Status: DISCONTINUED | OUTPATIENT
Start: 2019-09-19 | End: 2019-09-21 | Stop reason: HOSPADM

## 2019-09-19 RX ORDER — OXYCODONE HYDROCHLORIDE 5 MG/1
10-15 TABLET ORAL
Status: DISCONTINUED | OUTPATIENT
Start: 2019-09-19 | End: 2019-09-21

## 2019-09-19 RX ORDER — KETAMINE HYDROCHLORIDE 10 MG/ML
INJECTION INTRAMUSCULAR; INTRAVENOUS PRN
Status: DISCONTINUED | OUTPATIENT
Start: 2019-09-19 | End: 2019-09-19

## 2019-09-19 RX ORDER — ACETAMINOPHEN 325 MG/1
975 TABLET ORAL EVERY 8 HOURS
Status: DISCONTINUED | OUTPATIENT
Start: 2019-09-19 | End: 2019-09-21 | Stop reason: HOSPADM

## 2019-09-19 RX ORDER — OXYCODONE HYDROCHLORIDE 5 MG/1
5-10 TABLET ORAL
Status: DISCONTINUED | OUTPATIENT
Start: 2019-09-19 | End: 2019-09-19

## 2019-09-19 RX ORDER — ONDANSETRON 4 MG/1
4 TABLET, ORALLY DISINTEGRATING ORAL EVERY 6 HOURS PRN
Status: DISCONTINUED | OUTPATIENT
Start: 2019-09-19 | End: 2019-09-21 | Stop reason: HOSPADM

## 2019-09-19 RX ORDER — OXYCODONE HYDROCHLORIDE 5 MG/1
5-10 TABLET ORAL
Qty: 60 TABLET | Refills: 0 | Status: SHIPPED | OUTPATIENT
Start: 2019-09-19 | End: 2019-09-21

## 2019-09-19 RX ORDER — KETOROLAC TROMETHAMINE 30 MG/ML
30 INJECTION, SOLUTION INTRAMUSCULAR; INTRAVENOUS EVERY 6 HOURS
Status: DISPENSED | OUTPATIENT
Start: 2019-09-19 | End: 2019-09-20

## 2019-09-19 RX ORDER — PROPOFOL 10 MG/ML
INJECTION, EMULSION INTRAVENOUS PRN
Status: DISCONTINUED | OUTPATIENT
Start: 2019-09-19 | End: 2019-09-19

## 2019-09-19 RX ORDER — FENTANYL CITRATE 50 UG/ML
25-50 INJECTION, SOLUTION INTRAMUSCULAR; INTRAVENOUS
Status: DISCONTINUED | OUTPATIENT
Start: 2019-09-19 | End: 2019-09-19 | Stop reason: HOSPADM

## 2019-09-19 RX ORDER — CYCLOBENZAPRINE HCL 10 MG
10 TABLET ORAL 3 TIMES DAILY PRN
Status: DISCONTINUED | OUTPATIENT
Start: 2019-09-19 | End: 2019-09-21 | Stop reason: HOSPADM

## 2019-09-19 RX ORDER — KETOROLAC TROMETHAMINE 30 MG/ML
30 INJECTION, SOLUTION INTRAMUSCULAR; INTRAVENOUS
Status: COMPLETED | OUTPATIENT
Start: 2019-09-19 | End: 2019-09-19

## 2019-09-19 RX ORDER — LIDOCAINE 40 MG/G
CREAM TOPICAL
Status: DISCONTINUED | OUTPATIENT
Start: 2019-09-19 | End: 2019-09-19 | Stop reason: HOSPADM

## 2019-09-19 RX ADMIN — HYDROMORPHONE HYDROCHLORIDE 0.5 MG: 1 INJECTION, SOLUTION INTRAMUSCULAR; INTRAVENOUS; SUBCUTANEOUS at 11:49

## 2019-09-19 RX ADMIN — PROPOFOL: 10 INJECTION, EMULSION INTRAVENOUS at 11:45

## 2019-09-19 RX ADMIN — PROPOFOL 250 MG: 10 INJECTION, EMULSION INTRAVENOUS at 10:27

## 2019-09-19 RX ADMIN — FENTANYL CITRATE 50 MCG: 50 INJECTION, SOLUTION INTRAMUSCULAR; INTRAVENOUS at 12:14

## 2019-09-19 RX ADMIN — FENTANYL CITRATE 50 MCG: 50 INJECTION INTRAMUSCULAR; INTRAVENOUS at 16:27

## 2019-09-19 RX ADMIN — OXYCODONE HYDROCHLORIDE 10 MG: 5 TABLET ORAL at 22:56

## 2019-09-19 RX ADMIN — FENTANYL CITRATE 50 MCG: 50 INJECTION INTRAMUSCULAR; INTRAVENOUS at 13:04

## 2019-09-19 RX ADMIN — FENTANYL CITRATE 50 MCG: 50 INJECTION INTRAMUSCULAR; INTRAVENOUS at 15:23

## 2019-09-19 RX ADMIN — ONDANSETRON HYDROCHLORIDE 4 MG: 2 INJECTION, SOLUTION INTRAVENOUS at 12:05

## 2019-09-19 RX ADMIN — HYDROXYZINE HYDROCHLORIDE 25 MG: 25 TABLET ORAL at 19:22

## 2019-09-19 RX ADMIN — HYDROMORPHONE HYDROCHLORIDE 0.5 MG: 1 INJECTION, SOLUTION INTRAMUSCULAR; INTRAVENOUS; SUBCUTANEOUS at 13:10

## 2019-09-19 RX ADMIN — RANITIDINE 150 MG: 150 TABLET ORAL at 20:21

## 2019-09-19 RX ADMIN — KETOROLAC TROMETHAMINE 30 MG: 30 INJECTION, SOLUTION INTRAMUSCULAR at 22:12

## 2019-09-19 RX ADMIN — MIDAZOLAM 2 MG: 1 INJECTION INTRAMUSCULAR; INTRAVENOUS at 10:12

## 2019-09-19 RX ADMIN — SENNOSIDES AND DOCUSATE SODIUM 1 TABLET: 8.6; 5 TABLET ORAL at 20:21

## 2019-09-19 RX ADMIN — KETOROLAC TROMETHAMINE 30 MG: 30 INJECTION, SOLUTION INTRAMUSCULAR at 15:48

## 2019-09-19 RX ADMIN — Medication 100 MG: at 10:27

## 2019-09-19 RX ADMIN — ACETAMINOPHEN 975 MG: 325 TABLET, FILM COATED ORAL at 14:22

## 2019-09-19 RX ADMIN — OXYCODONE HYDROCHLORIDE 10 MG: 5 TABLET ORAL at 19:22

## 2019-09-19 RX ADMIN — CLONAZEPAM 1 MG: 0.5 TABLET ORAL at 22:12

## 2019-09-19 RX ADMIN — FENTANYL CITRATE 50 MCG: 50 INJECTION INTRAMUSCULAR; INTRAVENOUS at 14:01

## 2019-09-19 RX ADMIN — ACETAMINOPHEN 975 MG: 325 TABLET, FILM COATED ORAL at 22:12

## 2019-09-19 RX ADMIN — FLUVOXAMINE MALEATE 300 MG: 100 TABLET ORAL at 22:12

## 2019-09-19 RX ADMIN — Medication 50 MG: at 10:53

## 2019-09-19 RX ADMIN — CLONAZEPAM 0.25 MG: 0.25 TABLET, ORALLY DISINTEGRATING ORAL at 20:21

## 2019-09-19 RX ADMIN — PROPOFOL 200 MCG/KG/MIN: 10 INJECTION, EMULSION INTRAVENOUS at 10:29

## 2019-09-19 RX ADMIN — HYDROMORPHONE HYDROCHLORIDE 0.5 MG: 1 INJECTION, SOLUTION INTRAMUSCULAR; INTRAVENOUS; SUBCUTANEOUS at 13:41

## 2019-09-19 RX ADMIN — FENTANYL CITRATE 50 MCG: 50 INJECTION INTRAMUSCULAR; INTRAVENOUS at 13:31

## 2019-09-19 RX ADMIN — DEXAMETHASONE SODIUM PHOSPHATE 4 MG: 4 INJECTION, SOLUTION INTRA-ARTICULAR; INTRALESIONAL; INTRAMUSCULAR; INTRAVENOUS; SOFT TISSUE at 10:27

## 2019-09-19 RX ADMIN — DEXMEDETOMIDINE HYDROCHLORIDE 0.5 MCG/KG/HR: 100 INJECTION, SOLUTION INTRAVENOUS at 11:41

## 2019-09-19 RX ADMIN — CEFAZOLIN SODIUM 1 G: 1 INJECTION, SOLUTION INTRAVENOUS at 19:23

## 2019-09-19 RX ADMIN — FENTANYL CITRATE 50 MCG: 50 INJECTION INTRAMUSCULAR; INTRAVENOUS at 14:06

## 2019-09-19 RX ADMIN — HYDROMORPHONE HYDROCHLORIDE 0.5 MG: 1 INJECTION, SOLUTION INTRAMUSCULAR; INTRAVENOUS; SUBCUTANEOUS at 20:50

## 2019-09-19 RX ADMIN — SODIUM CHLORIDE, POTASSIUM CHLORIDE, SODIUM LACTATE AND CALCIUM CHLORIDE: 600; 310; 30; 20 INJECTION, SOLUTION INTRAVENOUS at 11:23

## 2019-09-19 RX ADMIN — GLYCOPYRROLATE 0.2 MG: 0.2 INJECTION, SOLUTION INTRAMUSCULAR; INTRAVENOUS at 10:27

## 2019-09-19 RX ADMIN — HYDROMORPHONE HYDROCHLORIDE 0.5 MG: 1 INJECTION, SOLUTION INTRAMUSCULAR; INTRAVENOUS; SUBCUTANEOUS at 14:50

## 2019-09-19 RX ADMIN — FENTANYL CITRATE 100 MCG: 50 INJECTION, SOLUTION INTRAMUSCULAR; INTRAVENOUS at 10:27

## 2019-09-19 RX ADMIN — CEFAZOLIN SODIUM 2 G: 2 INJECTION, SOLUTION INTRAVENOUS at 10:33

## 2019-09-19 RX ADMIN — POTASSIUM CHLORIDE AND SODIUM CHLORIDE: 900; 150 INJECTION, SOLUTION INTRAVENOUS at 19:23

## 2019-09-19 RX ADMIN — OXYCODONE HYDROCHLORIDE 10 MG: 5 TABLET ORAL at 16:28

## 2019-09-19 RX ADMIN — PROPOFOL 100 MG: 10 INJECTION, EMULSION INTRAVENOUS at 10:40

## 2019-09-19 RX ADMIN — HYDROMORPHONE HYDROCHLORIDE 0.5 MG: 1 INJECTION, SOLUTION INTRAMUSCULAR; INTRAVENOUS; SUBCUTANEOUS at 14:09

## 2019-09-19 RX ADMIN — LIDOCAINE HYDROCHLORIDE 50 MG: 10 INJECTION, SOLUTION INFILTRATION; PERINEURAL at 10:27

## 2019-09-19 RX ADMIN — SODIUM CHLORIDE, POTASSIUM CHLORIDE, SODIUM LACTATE AND CALCIUM CHLORIDE: 600; 310; 30; 20 INJECTION, SOLUTION INTRAVENOUS at 09:52

## 2019-09-19 RX ADMIN — DEXMEDETOMIDINE HYDROCHLORIDE 0.7 MCG/KG/HR: 100 INJECTION, SOLUTION INTRAVENOUS at 10:32

## 2019-09-19 RX ADMIN — HYDROMORPHONE HYDROCHLORIDE 0.5 MG: 1 INJECTION, SOLUTION INTRAMUSCULAR; INTRAVENOUS; SUBCUTANEOUS at 18:25

## 2019-09-19 RX ADMIN — HYDROMORPHONE HYDROCHLORIDE 0.5 MG: 1 INJECTION, SOLUTION INTRAMUSCULAR; INTRAVENOUS; SUBCUTANEOUS at 11:22

## 2019-09-19 ASSESSMENT — ACTIVITIES OF DAILY LIVING (ADL): ADLS_ACUITY_SCORE: 13

## 2019-09-19 ASSESSMENT — MIFFLIN-ST. JEOR: SCORE: 1966.43

## 2019-09-19 NOTE — PLAN OF CARE
PT: Attempted to see pt for eval at scheduled time. Pt not yet up to floor at 16:15. Will reschedule for 9/20.

## 2019-09-19 NOTE — ANESTHESIA POSTPROCEDURE EVALUATION
Patient: Hong Santiago    Procedure(s):  Thoracic 6-8 interbody and posterolateral fusion, minimally invasive    Diagnosis:t6-t8 ddd  Diagnosis Additional Information: No value filed.    Anesthesia Type:  General, ETT    Note:  Anesthesia Post Evaluation    Patient location during evaluation: PACU  Patient participation: Able to fully participate in evaluation  Level of consciousness: awake and alert  Pain management: adequate  Airway patency: patent  Cardiovascular status: acceptable  Respiratory status: acceptable  Hydration status: acceptable  PONV: controlled     Anesthetic complications: None          Last vitals:  Vitals:    09/19/19 1430 09/19/19 1445 09/19/19 1500   BP: 131/88 131/87 132/82   Pulse:  84 84   Resp: 16 16 16   Temp:      SpO2: 97% 97% 97%         Electronically Signed By: Raghavendra Castano MD  September 19, 2019  3:58 PM

## 2019-09-19 NOTE — OP NOTE
REPORT OF OPERATION  Hong Santiago is a 28 year old old male admitted on 9/19/2019  8:20 AM.  ?  Operative Date:  9/19/2019  PRE-PROCEDURE DIAGNOSIS:  1) T6/7/8 degenerative disc disease.    POST-PROCEDURE DIAGNOSIS:  1) Same as above  PROCEDURE PERFORMED:  1)T 6/7/8 Minimally invasive lateral transthoracic interbody fusion with discectomy, preparation of the endplate and placement of a bullet cage packed with calcium triphosphate anterior to the transverse process laterally in modified prone position, with intraoperative biplanar fluoroscopic imaging and electrophysiological monitoring.  2) T6/7/8 Posterior minimally invasive pedicle screw placement and posterolateral instrumentation and fusion with LNK system with intraoperative biplanar fluoroscopic imaging and electrophysiological monitoring.  3) Epidural steroid injection.  4) Transpedicular Bone marrow aspiration    Surgeon: Bobo Lopez MD  ASSISTANT: Haylie MORAES    This is Complex surgery ad an assistant is needed for safety and excecution of the surgery, assistant helps with instrumentation setup and retraction, as well as for positioning and closure of the incision.     HISTORY: Please refer to my clinic note for full details, but in short, patient is a 28 -year-old male with severe thoracic pain and radiculopathy not responding to usual conservative therapy. Patient was set up for the surgery as mentioned above and was taken to surgery as mentioned above after all risks and benefits were explained.  PROCEDURE:  The patient was taken to surgery. After general anesthesia was applied, SCDs and Rodriguez placed and preoperative antibiotic given, then patient has been positioned on the Vamshi table and Jason frame in a modified prone position for ease of access from the left side.  AP and lateral fluoroscopic images are positioned. Patient has been prepped and draped in sterile fashion. The landmarks, including Spinal process, transverse process, disk  space, endplates and pedicels are identified and marked.  Following steps are then taken for levels:  A Jamshidi needle is place in upper right pedicle inside of the vertebral body and bone marrow has been aspirated to be mixed with biologics to intruduce  Stem cells to the biologics.    T6/7  Cage size 10 mm high and 27 mm long Titanium  T7/8  Cage size 10 mm high and 27 mm long Titanium    The patient was turned using the rotation of the surgical table so that a near direct anterior-lateral approach to the thoracic spine could be achieved. A 10mm stab incision was then made in posterior axillary line and then using biplanar fluoroscopic visualization, under electrophysiological monitoring, we introduced a blunt cannulated probe into pleural cavity after making a small incision superior to the same level rib, and position it on top of the desired discs anterior to the transverse processes and then passed it into the disc space after passing a K-wire as guid into it.Following this, a 10 mm working channel was then passed sequentially into the disc spaces. The working channel was manually held in position while a series of disc cleaning tools was passed through the channel to remove the affected discs, decompress the nerve roots, and decorticate the vertebral endplates at those segments.      Arthrodesis of the intervertebral spaces via an laterall exposure and application of an intervertebral biomechanical device was then accomplished by using the working channel that had been placed in the pleural space anterior to the transverse processes. After adequate decompression and preparation of the endplates, we then put calcium triphosphate anterior into disc space and then a PEEK interbody was packed tightly with allograft bone for stabilization and arthrodesis of the intervertebral spaces and inserted into the mid portion of the intervertebral discs. This was done under biplanar fluoroscopic guidance. All bone was  confined to the borders of the disc space. The working channel was then removed.      Following steps are then taken for levels:  T6 5.5 Screw size Right 40 Left 40  T7 5.5  Screw size Right 40 Left 40  T8 6.5 Screw size Right 40 Left 40  Then patient is rotated for a true prone position. Then entry point for the pedicles is identified in the AP and lateral view, and then skin incision has been injected with local anesthetic. Then we entered the pedicle with a Jamshidi needle. Over the Jamshidi needle, we introduced the K wire in Vertebral body. Additionally we use a small periostal elevator along the screws to refresh the surface of the bone and facet and put minimal amount of Calcium-triphoisphate for additional posterolateral fusion. Over the K wire then , we dilate the muscle with the dilator and then put a pedicle screws bilaterally. Screws are all silent up to  20 MA of stimulation. After screws are all placed, we put dheeraj in place and under fluoroscopic imaging, we locked the dheeraj in place and removed the screw tops and then each incision has been closed with 2-0 Vicryl suture and then Steri-Strips applied.  Before the end of the surgery, we injected 40mg Kenalog and 1 cc 0.25% Marcaine for epidural steroid injection in epidural space under fluoroscopic imaging after we introduced the spinal needle and confirmed with injecting 2cc air and aspiration which confirms we are in the epidural space and no CSF is returned.  Additional finding: none   Estimated blood: 193scc.      DISPOSITION: To PACU with postoperative antibiotic. All counts are correct at the end of the surgery.  Bobo Lopez MD        CC Dr Lopez/[facility]

## 2019-09-19 NOTE — ANESTHESIA CARE TRANSFER NOTE
Patient: Hong Santiago    Procedure(s):  Thoracic 6-8 interbody and posterolateral fusion, minimally invasive    Diagnosis: t6-t8 ddd  Diagnosis Additional Information: No value filed.    Anesthesia Type:   General, ETT     Note:  Airway :ETT  Patient transferred to:PACU  Comments: Neuromuscular blockade not used after succinylcholine for intubation, spontaneous return of TOF 4/4 with sustained tetany, spontaneous respirations, adequate tidal volumes, followed commands to voice.  Oxygen via ETT at 6 liters per minute to PACU. Oxygen tubing connected to wall O2 in PACU, SpO2, NiBP, and EKG monitors and alarms on and functioning, Vanda Hugger warmer connected to patient gown, report on patient's clinical status given to PACU RN, RN questions answered. Handoff Report: Identifed the Patient, Identified the Reponsible Provider, Reviewed the pertinent medical history, Discussed the surgical course, Reviewed Intra-OP anesthesia mangement and issues during anesthesia, Set expectations for post-procedure period and Allowed opportunity for questions and acknowledgement of understanding      Vitals: (Last set prior to Anesthesia Care Transfer)    CRNA VITALS  9/19/2019 1202 - 9/19/2019 1240      9/19/2019             Pulse:  58    SpO2:  96 %    Resp Rate (observed):  2  (Abnormal)         116/74-57-10-99%-97.0F        Electronically Signed By: MOISES Pitts CRNA  September 19, 2019  12:40 PM

## 2019-09-19 NOTE — DISCHARGE SUMMARY
Discharge Summary    Attending Physician:  Bobo Lopez MD  Admit Date: 9/19/2019    Discharge Date: 9/21/19  Primary Care Physician: Osiris Faust    Discharge Diagnoses  [unfilled]    Discharge Exam    AAOx3 WAN f/c all for , no weakness, No new sensory deficit, incision C/D/I        Preliminary Discharge Medications    This list of medications is preliminary and tentative.  Please see the After Visit Summary for the final and accurate medication list.    [unfilled]    Procedures Performed and Findings  Procedure(s):  Thoracic 6-8 interbody and posterolateral fusion, minimally invasive       Consultations Obtained  HOSPITALIST IP CONSULT  OCCUPATIONAL THERAPY ADULT IP CONSULT  PHYSICAL THERAPY ADULT IP CONSULT  PAIN MANAGEMENT ADULT IP CONSULT  SOCIAL WORK IP CONSULT    Code Status   No Order    Discharge Disposition        Diet on Discharge   Regular    Activity on Discharge   Your activity upon discharge: Ad andrea within following limitations:  No excessive activities   No Bending, Twisting, climbing, Crawling,   No lifting more than 8 lb for 2 weeks, or 15 lb for 2 months or 25 lb for 4 months or 35 lb for 6 months  Brace for riding cars for 4-6 months      Discharge Instructions  Per [facility] instruction : [email or web address]        Follow-Up Scheduled    Follow up in 2 weeks with me or PCP for wound check ( patient's choice) if patients want to go to PCP for wound check, then f/u in my office  In 3 months      Hospital Course   Hospital Course unremarkable , adequate ambulation in due time, pain controlled , cleared by PT/OT, no events         CC Dr Lopez/[facility]Cozy

## 2019-09-19 NOTE — PLAN OF CARE
Reviewed discharge instructions and medications with patient and mother.  Questions answered. Patient discharged to home with discharge instructions, medications (oxycodone and amoxicillin ), and belongings at this time.

## 2019-09-19 NOTE — PROGRESS NOTES
Hospitalist consult ordered following thoracic spine fusion with discectomy.  Full consult to be completed tomorrow morning.  Patient requesting his home clonazepam 0.25 mg twice daily and 1 mg at bedtime to be ordered.  Spoke with patient's RN and he is not showing any signs of excessive sedation.  Have reordered his clonazepam and will monitor for any sedation while also receiving opiates post surgically.

## 2019-09-19 NOTE — ANESTHESIA PREPROCEDURE EVALUATION
Anesthesia Pre-Procedure Evaluation    Patient: Hong Santiago   MRN: [id number] : 1990          Preoperative Diagnosis: t6-t8 ddd    Procedure(s):  Thoracic 6-8 interbody and posterolateral fusion, minimally invasive versus open    Past Medical History:   Diagnosis Date     Anxiety      Past Surgical History:   Procedure Laterality Date     [facility]     Anesthesia Evaluation     . Pt has had prior anesthetic. Type: MAC           ROS/MED HX    ENT/Pulmonary:  - neg pulmonary ROS     Neurologic:     (+)other neuro radiculopathic pain    Cardiovascular:  - neg cardiovascular ROS       METS/Exercise Tolerance:     Hematologic:  - neg hematologic  ROS       Musculoskeletal:  - neg musculoskeletal ROS       GI/Hepatic:         Renal/Genitourinary:  - ROS Renal section negative       Endo:  - neg endo ROS       Psychiatric:     (+) psychiatric history anxiety      Infectious Disease:  - neg infectious disease ROS       Malignancy:      - no malignancy   Other:    (+) No chance of pregnancy C-spine cleared: N/A, no H/O Chronic Pain,no other significant disability   - neg other ROS                      Physical Exam  Normal systems: cardiovascular, pulmonary and dental    Airway   Mallampati: I  TM distance: >3 FB  Neck ROM: full    Dental     Cardiovascular       Pulmonary             Lab Results   Component Value Date    WBC 7.3 09/10/2019    HGB 16.0 09/10/2019    HCT 47.5 09/10/2019     09/10/2019    .6 09/10/2019    POTASSIUM 4.16 09/10/2019    CHLORIDE 101.0 09/10/2019    CO2 31.8 09/10/2019    BUN 15 09/10/2019    BUN 13.9 09/10/2019    CR 1.08 09/10/2019    GLC 98 09/10/2019    KATHLEEN 10.2 09/10/2019    ALBUMIN 5.2 (H) 10/30/2008    PROTTOTAL 9.2 (H) 10/30/2008    ALT 27 10/30/2008    AST 32 10/30/2008    ALKPHOS 63 (L) 10/30/2008    BILITOTAL 0.9 10/30/2008    LIPASE 54 10/30/2008    PTT 30 2019    INR 1.06 2019       Preop Vitals  BP Readings from Last  "3 Encounters:   09/19/19 125/83   09/10/19 112/68   08/03/19 108/66    Pulse Readings from Last 3 Encounters:   09/10/19 67   08/03/19 73   06/24/19 74      Resp Readings from Last 3 Encounters:   04/23/18 20   07/19/17 16   06/10/13 16    SpO2 Readings from Last 3 Encounters:   09/19/19 95%   09/10/19 97%   08/03/19 97%      Temp Readings from Last 1 Encounters:   09/19/19 98.5  F (36.9  C) (Temporal)    Ht Readings from Last 1 Encounters:   09/19/19 1.854 m (6' 1\")      Wt Readings from Last 1 Encounters:   09/19/19 95.3 kg (210 lb)    Estimated body mass index is 27.71 kg/m  as calculated from the following:    Height as of this encounter: 1.854 m (6' 1\").    Weight as of this encounter: 95.3 kg (210 lb).       Anesthesia Plan      History & Physical Review  History and physical reviewed and following examination; no interval change.    ASA Status:  2 .    NPO Status:  > 8 hours    Plan for General and ETT with Intravenous induction. Maintenance will be Balanced.    PONV prophylaxis:  Ondansetron (or other 5HT-3) and Dexamethasone or Solumedrol       Postoperative Care  Postoperative pain management:  IV analgesics.      Consents  Anesthetic plan, risks, benefits and alternatives discussed with:  Patient.  Use of blood products discussed: Yes.   Use of blood products discussed with Patient.  Consented to blood products.  .                 Raghavendra Castano MD                    .  "

## 2019-09-19 NOTE — PROGRESS NOTES
"SPIRITUAL HEALTH SERVICES Progress Note  [facility] Pre-surg    Pt visited due to pre-surg request.    Pt was accompanied by family and expressed some anxiety about the oncoming spine surgery. Pt said that he had had a prior \"bunion\" on his foot and had to be put under previously for it, but still was anxious about the process. Needs were assessed and prayer was requested. Prayer was given as the family joined in.    Pt stated he will be spending at least an overnight in Ortho-spine and was informed on how to reach out to [facility] should he need a visit while he's here after his surgery.    Amando Perez   Intern  Phone: [phone]    "

## 2019-09-20 ENCOUNTER — APPOINTMENT (OUTPATIENT)
Dept: GENERAL RADIOLOGY | Facility: CLINIC | Age: 29
End: 2019-09-20
Attending: INTERNAL MEDICINE
Payer: COMMERCIAL

## 2019-09-20 ENCOUNTER — APPOINTMENT (OUTPATIENT)
Dept: PHYSICAL THERAPY | Facility: CLINIC | Age: 29
End: 2019-09-20
Attending: PHYSICIAN ASSISTANT
Payer: COMMERCIAL

## 2019-09-20 ENCOUNTER — APPOINTMENT (OUTPATIENT)
Dept: OCCUPATIONAL THERAPY | Facility: CLINIC | Age: 29
End: 2019-09-20
Attending: NEUROLOGICAL SURGERY
Payer: COMMERCIAL

## 2019-09-20 LAB
ANION GAP SERPL CALCULATED.3IONS-SCNC: 3 MMOL/L (ref 3–14)
BUN SERPL-MCNC: 10 MG/DL (ref 7–30)
CALCIUM SERPL-MCNC: 9.1 MG/DL (ref 8.5–10.1)
CHLORIDE SERPL-SCNC: 106 MMOL/L (ref 94–109)
CO2 SERPL-SCNC: 32 MMOL/L (ref 20–32)
CREAT SERPL-MCNC: 0.88 MG/DL (ref 0.66–1.25)
GFR SERPL CREATININE-BSD FRML MDRD: >90 ML/MIN/{1.73_M2}
GLUCOSE SERPL-MCNC: 123 MG/DL (ref 70–99)
HGB BLD-MCNC: 14.1 G/DL (ref 13.3–17.7)
INTERPRETATION ECG - MUSE: NORMAL
POTASSIUM SERPL-SCNC: 4.2 MMOL/L (ref 3.4–5.3)
SODIUM SERPL-SCNC: 141 MMOL/L (ref 133–144)

## 2019-09-20 PROCEDURE — 99221 1ST HOSP IP/OBS SF/LOW 40: CPT | Performed by: NURSE PRACTITIONER

## 2019-09-20 PROCEDURE — 97165 OT EVAL LOW COMPLEX 30 MIN: CPT | Mod: GO | Performed by: REHABILITATION PRACTITIONER

## 2019-09-20 PROCEDURE — 80048 BASIC METABOLIC PNL TOTAL CA: CPT | Performed by: PHYSICIAN ASSISTANT

## 2019-09-20 PROCEDURE — 25000132 ZZH RX MED GY IP 250 OP 250 PS 637: Performed by: NEUROLOGICAL SURGERY

## 2019-09-20 PROCEDURE — 25000132 ZZH RX MED GY IP 250 OP 250 PS 637: Performed by: NURSE PRACTITIONER

## 2019-09-20 PROCEDURE — 97116 GAIT TRAINING THERAPY: CPT | Mod: GP

## 2019-09-20 PROCEDURE — 97530 THERAPEUTIC ACTIVITIES: CPT | Mod: GP

## 2019-09-20 PROCEDURE — 99207 ZZC CONSULT E&M CHANGED TO SUBSEQUENT LEVEL: CPT | Performed by: INTERNAL MEDICINE

## 2019-09-20 PROCEDURE — 99232 SBSQ HOSP IP/OBS MODERATE 35: CPT | Performed by: INTERNAL MEDICINE

## 2019-09-20 PROCEDURE — 85018 HEMOGLOBIN: CPT | Performed by: PHYSICIAN ASSISTANT

## 2019-09-20 PROCEDURE — 25000132 ZZH RX MED GY IP 250 OP 250 PS 637: Performed by: INTERNAL MEDICINE

## 2019-09-20 PROCEDURE — 97116 GAIT TRAINING THERAPY: CPT | Mod: GP | Performed by: PHYSICAL THERAPY ASSISTANT

## 2019-09-20 PROCEDURE — 97161 PT EVAL LOW COMPLEX 20 MIN: CPT | Mod: GP

## 2019-09-20 PROCEDURE — 36415 COLL VENOUS BLD VENIPUNCTURE: CPT | Performed by: PHYSICIAN ASSISTANT

## 2019-09-20 PROCEDURE — 25000132 ZZH RX MED GY IP 250 OP 250 PS 637: Performed by: PHYSICIAN ASSISTANT

## 2019-09-20 PROCEDURE — 97535 SELF CARE MNGMENT TRAINING: CPT | Mod: GO | Performed by: REHABILITATION PRACTITIONER

## 2019-09-20 PROCEDURE — 12000000 ZZH R&B MED SURG/OB

## 2019-09-20 PROCEDURE — 25000128 H RX IP 250 OP 636: Performed by: PHYSICIAN ASSISTANT

## 2019-09-20 PROCEDURE — 97530 THERAPEUTIC ACTIVITIES: CPT | Mod: GP | Performed by: PHYSICAL THERAPY ASSISTANT

## 2019-09-20 PROCEDURE — 40000986 XR CHEST 2 VW

## 2019-09-20 RX ORDER — LIDOCAINE 4 G/G
2 PATCH TOPICAL
Status: DISCONTINUED | OUTPATIENT
Start: 2019-09-20 | End: 2019-09-21 | Stop reason: HOSPADM

## 2019-09-20 RX ORDER — GABAPENTIN 300 MG/1
300 CAPSULE ORAL AT BEDTIME
Status: DISCONTINUED | OUTPATIENT
Start: 2019-09-20 | End: 2019-09-21 | Stop reason: HOSPADM

## 2019-09-20 RX ADMIN — KETOROLAC TROMETHAMINE 30 MG: 30 INJECTION, SOLUTION INTRAMUSCULAR at 04:00

## 2019-09-20 RX ADMIN — DICLOFENAC 2 G: 10 GEL TOPICAL at 10:54

## 2019-09-20 RX ADMIN — KETOROLAC TROMETHAMINE 30 MG: 30 INJECTION, SOLUTION INTRAMUSCULAR at 10:12

## 2019-09-20 RX ADMIN — CEFAZOLIN SODIUM 1 G: 1 INJECTION, SOLUTION INTRAVENOUS at 02:25

## 2019-09-20 RX ADMIN — SENNOSIDES AND DOCUSATE SODIUM 1 TABLET: 8.6; 5 TABLET ORAL at 08:38

## 2019-09-20 RX ADMIN — HYDROMORPHONE HYDROCHLORIDE 0.5 MG: 1 INJECTION, SOLUTION INTRAMUSCULAR; INTRAVENOUS; SUBCUTANEOUS at 10:51

## 2019-09-20 RX ADMIN — CLONAZEPAM 0.25 MG: 0.25 TABLET, ORALLY DISINTEGRATING ORAL at 16:51

## 2019-09-20 RX ADMIN — CLONAZEPAM 1 MG: 0.5 TABLET ORAL at 22:27

## 2019-09-20 RX ADMIN — FLUVOXAMINE MALEATE 300 MG: 100 TABLET ORAL at 22:27

## 2019-09-20 RX ADMIN — HYDROMORPHONE HYDROCHLORIDE 0.5 MG: 1 INJECTION, SOLUTION INTRAMUSCULAR; INTRAVENOUS; SUBCUTANEOUS at 00:23

## 2019-09-20 RX ADMIN — ACETAMINOPHEN 975 MG: 325 TABLET, FILM COATED ORAL at 06:00

## 2019-09-20 RX ADMIN — SENNOSIDES AND DOCUSATE SODIUM 1 TABLET: 8.6; 5 TABLET ORAL at 20:55

## 2019-09-20 RX ADMIN — ACETAMINOPHEN 975 MG: 325 TABLET, FILM COATED ORAL at 15:11

## 2019-09-20 RX ADMIN — LIDOCAINE 2 PATCH: 560 PATCH PERCUTANEOUS; TOPICAL; TRANSDERMAL at 21:00

## 2019-09-20 RX ADMIN — HYDROMORPHONE HYDROCHLORIDE 0.5 MG: 1 INJECTION, SOLUTION INTRAMUSCULAR; INTRAVENOUS; SUBCUTANEOUS at 04:11

## 2019-09-20 RX ADMIN — OXYCODONE HYDROCHLORIDE 15 MG: 5 TABLET ORAL at 15:12

## 2019-09-20 RX ADMIN — RANITIDINE 150 MG: 150 TABLET ORAL at 20:56

## 2019-09-20 RX ADMIN — ACETAMINOPHEN 975 MG: 325 TABLET, FILM COATED ORAL at 22:27

## 2019-09-20 RX ADMIN — OXYCODONE HYDROCHLORIDE 15 MG: 5 TABLET ORAL at 06:00

## 2019-09-20 RX ADMIN — OXYCODONE HYDROCHLORIDE 15 MG: 5 TABLET ORAL at 08:44

## 2019-09-20 RX ADMIN — OXYCODONE HYDROCHLORIDE 15 MG: 5 TABLET ORAL at 11:58

## 2019-09-20 RX ADMIN — CLONAZEPAM 0.25 MG: 0.25 TABLET, ORALLY DISINTEGRATING ORAL at 08:38

## 2019-09-20 RX ADMIN — CYCLOBENZAPRINE HYDROCHLORIDE 10 MG: 10 TABLET, FILM COATED ORAL at 00:38

## 2019-09-20 RX ADMIN — RANITIDINE 150 MG: 150 TABLET ORAL at 08:38

## 2019-09-20 RX ADMIN — DICLOFENAC 2 G: 10 GEL TOPICAL at 16:52

## 2019-09-20 RX ADMIN — OXYCODONE HYDROCHLORIDE 15 MG: 5 TABLET ORAL at 20:59

## 2019-09-20 RX ADMIN — OXYCODONE HYDROCHLORIDE 15 MG: 5 TABLET ORAL at 02:24

## 2019-09-20 RX ADMIN — GABAPENTIN 300 MG: 300 CAPSULE ORAL at 22:27

## 2019-09-20 RX ADMIN — OXYCODONE HYDROCHLORIDE 15 MG: 5 TABLET ORAL at 17:58

## 2019-09-20 ASSESSMENT — ACTIVITIES OF DAILY LIVING (ADL)
ADLS_ACUITY_SCORE: 14
IADL_COMMENTS: FAMILY TO COMPLETE
ADLS_ACUITY_SCORE: 14

## 2019-09-20 NOTE — PLAN OF CARE
"Discharge Planner PT   Patient plan for discharge: home  Current status: PT orders received, eval completed, treatment initiated. Pt is 28 y.o. M POD #1 T6-8 interbody and PLF, minimally invasive. Pt lives with parents in house with 1 platform step to enter, bedroom on LL with 12 steps down with 1 rail, but plans to stay on main level initially. Bathroom set-up includes tub shower, standard toilet. Pt previously ind with all mobility,works full time as  for [employer] and is on his feet all day for work.     Currently, pt received seated in chair in room with brace donned, agreeable to PT. Performed sit>stand with CGA. Balance: 30\" EO rhomberg stance, 30\" EC Rhomberg stance with increased sway, some unsteadiness when in SLS while marching in place. Ambulated >400' with SBA and no AD- incorporated head turns for balance challenge. Somewhat stiff posture, guarded at times but no overt LOB. Performed 1x4 steps with B rails and SBA, 1x4 steps with R rail and SBA, 1 platform step with no rail and SBA. Reivewed spine precautions, practiced supine<>sit at flat bed with no rail with cues for log roll technique and SBA. Pt sitting up in chair upon departure, all needs in reach.     Barriers to return to prior living situation: none anticipated  Recommendations for discharge: Home with assist of parents as needed for household tasks, transportation  Rationale for recommendations: Patient mobility appropriate for discharge home with assist of parents for household tasks, transportation, mobility as needed. Will continue to see in IP setting 2x/day to progress dynamic balance and independence with mobility.       Entered by: Anna Guadarrama 09/20/2019 9:58 AM       "

## 2019-09-20 NOTE — PROGRESS NOTES
09/20/19 0900   Quick Adds   Type of Visit Initial PT Evaluation   Living Environment   Lives With parent(s)   Living Arrangements house   Home Accessibility stairs to enter home;stairs within home   Number of Stairs, Main Entrance 1   Stair Railings, Main Entrance none   Number of Stairs, Within Home, Primary   (12)   Stair Railings, Within Home, Primary railing on right side (ascending)   Living Environment Comment Pt's bedroom is on LL but plans to stay on main level initially. Bathroom set up includes small walk in shower on LL, tub shower on upper level, standard height toilet, no grab bars.    Self-Care   Usual Activity Tolerance good   Current Activity Tolerance good   Regular Exercise No  (somewhat active job)   Equipment Currently Used at Home   (Does have crutches at home)   Activity/Exercise/Self-Care Comment works as  at [employer] office- pt states he is on his feet all day   Functional Level Prior   Ambulation 0-->independent   Transferring 0-->independent   Toileting 0-->independent   Bathing 0-->independent   Fall history within last six months no   General Information   Onset of Illness/Injury or Date of Surgery - Date 09/19/19   Referring Physician Haylie Anderson PA-C   Patient/Family Goals Statement not stated   Pertinent History of Current Problem (include personal factors and/or comorbidities that impact the POC)  Pt is 28 y.o. M POD #1 T6-8 interbody and PLF, minimally invasive.   Precautions/Limitations fall precautions;spinal precautions   General Info Comments Activity: Ambulate   Cognitive Status Examination   Orientation orientation to person, place and time   Level of Consciousness alert   Follows Commands and Answers Questions 100% of the time;able to follow multistep instructions   Personal Safety and Judgment intact   Pain Assessment   Patient Currently in Pain Yes, see Vital Sign flowsheet  (4/10)   Integumentary/Edema   Integumentary/Edema Comments brace on throughout session,  "dressing not visualized   Posture    Posture Forward head position   Range of Motion (ROM)   ROM Comment Spine ROM limited secondary to post-op status and precautions, all other ROM appears WFL   Strength   Strength Comments Strength not formally assessed, pt appears to have some core weakness with bed mobility, LE strength appears WFL with transfers.    Bed Mobility   Bed Mobility Comments Sit>sidelying with SBA at flat bed without rail, sidelying>supine with CGA.   Transfer Skills   Transfer Comments Sit>stand with CGA, no AD   Gait   Gait Comments Ambulated 20' with no AD with CGA, stiff posture, somewhat guarded   Balance   Balance Comments 30\" EO rhomberg stance, 30\" EC Rhomberg stance with increased sway, some unsteadiness when in SLS while marching in place.   Sensory Examination   Sensory Perception Comments reports some numbness L LE toes and calf, reports this is slightly more noticable now than before surgery. Reports he did have some numbness/tingoling in hands prio to surgery, absent now.   Modality Interventions   Planned Modality Interventions Cryotherapy   General Therapy Interventions   Planned Therapy Interventions balance training;bed mobility training;gait training;neuromuscular re-education;ROM;strengthening;stretching;transfer training;home program guidelines;progressive activity/exercise   Clinical Impression   Criteria for Skilled Therapeutic Intervention yes, treatment indicated   PT Diagnosis decreased independence with functional mobility   Influenced by the following impairments pain, novelty of spine precautions, decreased balance   Functional limitations due to impairments decreased independence with bed mobility, transfers, ambulation, stairs   Clinical Presentation Stable/Uncomplicated   Clinical Presentation Rationale clinical judgement   Clinical Decision Making (Complexity) Low complexity   Therapy Frequency 2x/day   Predicted Duration of Therapy Intervention (days/wks) 2 days " "  Anticipated Equipment Needs at Discharge   (no mobility AD needs anticipated)   Anticipated Discharge Disposition Home with Assist  (Assist of parent's for household tasks, transportation)   Risk & Benefits of therapy have been explained Yes   Patient, Family & other staff in agreement with plan of care Yes   Clinical Impression Comments Patient mobility appropriate for discharge home with assist of parents for household tasks, transportation, mobility as needed. Will continue to see in IP setting 2x/day to progress dynamic balance and independence with mobility.   [facility] TM \"6 Clicks\"   2016, Trustees of [facility], under license to [employer].  All rights reserved.   6 Clicks Short Forms Basic Mobility Inpatient Short Form   [facility]  \"6 Clicks\" V.2 Basic Mobility Inpatient Short Form   1. Turning from your back to your side while in a flat bed without using bedrails? 3 - A Little   2. Moving from lying on your back to sitting on the side of a flat bed without using bedrails? 3 - A Little   3. Moving to and from a bed to a chair (including a wheelchair)? 3 - A Little   4. Standing up from a chair using your arms (e.g., wheelchair, or bedside chair)? 3 - A Little   5. To walk in hospital room? 3 - A Little   6. Climbing 3-5 steps with a railing? 3 - A Little   Basic Mobility Raw Score (Score out of 24.Lower scores equate to lower levels of function) 18   Total Evaluation Time   Total Evaluation Time (Minutes) 8     "

## 2019-09-20 NOTE — PLAN OF CARE
Discharge Planner PT   Patient plan for discharge: home  Current status:  PT- Pt is indep with sit to/from stand and indep with bed to/from chair. Goal met Pt is indep with sit to/from sidelying to/from supine with vcs for correct bed mobility.  Goal met  PT - Pt amb 300' independently with Aspen brace with step thru gait pattern. Goal met  PT - Pt performed 12 steps with 1 rail independently. Goal met  Barriers to return to prior living situation: none anticipated  Recommendations for discharge: Home with assist of parents as needed for household tasks, transportation per plan established by the PT.  Rationale for recommendations:  PT - Collaborated with PT - all goals met - please refer to discharge summary.     Physical Therapy Discharge Summary    Reason for therapy discharge:    All goals and outcomes met, no further needs identified.    Progress towards therapy goal(s). See goals on Care Plan [facility] electronic health record for goal details.  Goals met    Therapy recommendation(s):    Gradually increase time ambulating.           Entered by: Falguni Oates 09/20/2019 2:39 PM

## 2019-09-20 NOTE — PROGRESS NOTES
DAILY PROGRESS NOTE    Hong Santiago is a 28 year old old male admitted on 9/19/2019  8:20 AM.    Subjective  Is comfortable, some pain in the rib is surgical      Objective    AAOx3, WAN , f/c 4/4   Ambulating,   CTC HW in excellent position very minimal pneumo expected, X ray this am  No significant finding     Hemoglobin   Date Value Ref Range Status   09/20/2019 14.1 13.3 - 17.7 g/dL Final   ]      Impression / Plan       Plan for today:    Patient doing well  Ambulate with help  PT OT, possibly clearance   D/c ada this am  Full diet  Today  Wean and transition to PO meds today   If cleared by PT ( including stairs discharge home today otherwise tomorrow

## 2019-09-20 NOTE — CONSULTS
[facility]  Hospitalist Consult Note  Name: Hong Santiago    MRN: [id number]  YOB: 1990    Age: 28 year old  Date of admission: 9/19/2019  Primary care provider: Osiris Faust     Requesting Physician:  Dr. Falk  Reason for consult:  Post-operative medical management         Assessment and Plan:   Hong Santiago is a 28 year old male with a history of anxiety, depression and chronic back pain who was admitted for thoracic interbody and posterior lateral fusion, minimally invasive.  I was consulted to help with medical comanagement postoperatively.  He was noted to have what appears to be a tiny pneumothorax on CT scan of no apparent clinical significance.  Repeat chest x-ray this morning was negative for pneumothorax.  Suspect this may have been related to some very mild barotrauma.  I recommended he follow-up on this in clinic in a couple weeks with a chest x-ray to make sure it remains resolved.  No prior personal or family issues with pneumothoraces.    1.  Chronic back pain s/p thoracic 6 through 8 interbody and posterior lateral fusion: Defer pain control, activity and DVT prophylaxis to primary surgical team.  The patient indicates to me he will likely discharge home tomorrow.    2.  Incidental finding of tiny pneumothorax on CT of his chest which I believe is obtained for imaging of his thoracic spine: Discussed the findings with him personally.  Not present on the chest x-ray today and frankly extremely difficult to even see on the CT scan.  Given no prior history of pneumothoraces in him or his family I suspect this is probably due more to barotrauma than anything.  I recommended he follow-up for repeat chest x-ray in 1 to 2 weeks in clinic and certainly be seen if he develops respiratory problems.        Thank you for the consultation, we will continue to follow along during the hospitalization. Please page with any questions or concerns.         History of  Present Illness:   Hong Santiago is a 28 year old male with a history of anxiety, depression and chronic back pain who was admitted for thoracic interbody and posterior lateral fusion, minimally invasive.  I was consulted to help with medical comanagement postoperatively.  He was noted to have what appears to be a tiny pneumothorax on CT scan of no apparent clinical significance.  Repeat chest x-ray this morning was negative for pneumothorax.  Suspect this may have been related to some very mild barotrauma.  I recommended he follow-up on this in clinic in a couple weeks with a chest x-ray to make sure it remains resolved.  No prior personal or family issues with pneumothoraces.                  Past Medical History:     Past Medical History:   Diagnosis Date     Anxiety              Past Surgical History:     Past Surgical History:   Procedure Laterality Date     FUSION SPINE POSTERIOR MINIMALLY INVASIVE TWO LEVELS N/A 9/19/2019    Procedure: Thoracic 6-8 interbody and posterolateral fusion, minimally invasive epidural steroid injection;  Surgeon: Bobo Lopez MD;  Location: [facility] OR     ORTHOPEDIC SURGERY Right 2010    [facility]               Social History:     Social History     Tobacco Use     Smoking status: Current Some Day Smoker     Types: Other     Smokeless tobacco: Former User     Types: Chew     Tobacco comment: e-cig   Substance Use Topics     Alcohol use: Yes     Comment: 2 drinks per week             Family History:   Family history was fully reviewed and non-contributory in this case.          Allergies:   No Known Allergies          Medications:     Prior to Admission medications    Medication Sig Last Dose Taking? Auth Provider   clonazePAM (KLONOPIN) 0.25 mg TABS half-tab Take 0.25 mg by mouth 2 times daily 9/19/2019 at 0700 Yes Reported, Patient   clonazePAM (KLONOPIN) 1 MG tablet Take 1 mg by mouth At Bedtime  9/18/2019 at 2200 Yes Reported, Patient   cyclobenzaprine (FLEXERIL) 10 MG tablet Take 1  "tablet (10 mg) by mouth 3 times daily as needed for muscle spasms  Yes Bobo Lopez MD   fluvoxaMINE (LUVOX) 100 MG tablet Take 300 mg by mouth At Bedtime  9/18/2019 at 2200 Yes Osiris Faust PA   HEMP OIL OR EXTRACT OR OTHER CBD CANNABINOID, NOT MEDICAL CANNABIS,  9/18/2019 at 1000 Yes Reported, Patient   hydrOXYzine (ATARAX) 25 MG tablet Take 1 tablet (25 mg) by mouth every 6 hours as needed for itching  Yes Bobo Lopez MD   oxyCODONE (ROXICODONE) 5 MG tablet Take 1-2 tablets (5-10 mg) by mouth every 3 hours as needed  Yes Bobo Lopez MD       Current hospital administered medication list (MAR) also reviewed.          Review of Systems:   A comprehensive greater than 10 system review of systems was carried out.  Pertinent positives and negatives are noted above.  Otherwise negative for contributory info.            Physical Exam:   Blood pressure 128/80, pulse 72, temperature 97.8  F (36.6  C), temperature source Oral, resp. rate 16, height 1.854 m (6' 1\"), weight 95.3 kg (210 lb), SpO2 95 %.  Exam:  GENERAL: No apparent distress. Awake, alert, and fully oriented.  HEENT: Normocephalic, atraumatic. Extraocular movements intact.  CARDIOVASCULAR: Regular rate and rhythm without murmurs or rubs. No S3.  PULMONARY: Clear bilaterally.  Good air movement throughout including at lung apices  ABDOMINAL: Soft, non-tender, non-distended. Bowel sounds normoactive. No hepatosplenomegaly.  EXTREMITIES: No cyanosis or clubbing. No edema.  NEUROLOGICAL: CN 2-12 grossly intact, no focal neurological deficits.  DERMATOLOGICAL: No rash, ulcer, ecchymoses, jaundice.         Data:   Imaging:  Reviewed.    EKG/Telemetry:  Reviewed.    Labs: Reviewed.   Recent Labs   Lab 09/20/19  0540   HGB 14.1          Lab Results   Component Value Date     09/20/2019    .6 09/10/2019     10/30/2008    Lab Results   Component Value Date    CHLORIDE 106 09/20/2019    CHLORIDE 101.0 09/10/2019    CHLORIDE 101 " 10/30/2008    Lab Results   Component Value Date    BUN 10 09/20/2019    BUN 15 09/10/2019    BUN 13.9 09/10/2019    BUN 15 10/30/2008      Lab Results   Component Value Date    POTASSIUM 4.2 09/20/2019    POTASSIUM 4.16 09/10/2019    POTASSIUM 4.0 10/30/2008    Lab Results   Component Value Date    CO2 32 09/20/2019    CO2 31.8 09/10/2019    CO2 28 10/30/2008    Lab Results   Component Value Date    CR 0.88 09/20/2019    CR 1.08 09/10/2019    CR 1.03 10/30/2008          Hong Carlsno MD  [facility] Hospitalist  September 20, 2019

## 2019-09-20 NOTE — CONSULTS
[facility]  Pain Service Consultation   Text Page    Date of Admission:  9/19/2019    Assessment & Plan   Hong Santiago is a 28 year old male who was admitted on 9/19/2019. I was asked by Haylie Anderson to see the patient for pain management.    1)  Acute thoracic spine pain s/p T6-8 spinal fusion.  Post operative day 1.    2)  Patient with chronic back pain of thoracic spine, was treated conservatively over the past 13 years, but has had worsening symptoms of late and surgical intervention was indicated.      Baseline opioid use is 0 mg daily of morphine equivalents  Patient has no expected opioid tolerance.     Patient's opioid use in past 24 hours: 450 mcg IV fentanyl, 5 mg IV dilaudid, 60 mg PO dilaudid  = 325 mg Daily Morphine Equivalent    3)  Risk factors for opioid related harms  -Concurrent benzodiazepine use  -High opioid dose (>50 MME/day)  -Anxiety/depression    4)  Opioid induced side-effects:  -Constipation - previously used senna-s with no complication of constipation with opiate use.  -Nausea/Vomit- none reported.  -Sedation - none reported, higher risk due to benzodiazepine use.  -Urinary Retention - none reported.    5)  Other/Related:    -Depression/anxiety - historical, patient well controlled at this time.    PLAN:   1)  Physical therapy as ordered  2)  Brace on per surgical recommendations.  3)Non-opioid multimodal medication therapy  -Topical: Voltaren 1% Topical Gel three times daily, Lidocaine Patch 4% apply every evening, remove every morning  -N-SAIDS: Avoid due to spinal fusion  -Muscle Relaxants: Cyclobenzaprine 10 mg three times daily.  -Adjuvants: Acetaminophen 975 mg scheduled three times daily while inpatient, Gabapentin 300 mg q HS for 1-2 weeks, hydroxyzine 25 mg q 6 hours prn  -Antidepresants/anxiolytics:Clonazepam 0.25 mg twice daily,1 mg at bedtime, fluvoxamine 300 mg at bedtime   4)  Non-medication interventions  Positioning, ICE, Distraction with TV, family  visitors, Essential oils per nursing, Physical therapy as ordered, Brace when OOB.  5)  Opioids:  - Oxycodone 10-15 mg q 4 hours prn for moderate to severe pain  - hydromorphone IV 0.3 - 0.5 mg q 2 hours prn for breakthrough pain   Opioids Treatment Goal: -Improvement in function  -Participate in PT  -Post-operative pain management, plan for taper off over 1-2 weeks.  6)  Constipation Prophylaxis   Senna-S 1-2 tablets BID  7)  Pain Education  -Opioid safe use, storage and disposal information included in DC AVS  8)  DC Planning   Discussed goal of Opioid therapy as above with patient and family.  Length of therapy is less than 10 days, opioids may be stopped without taper.  Continued outpatient management of pain per surgical team  Disposition: home today or tomorrow pending PT evaluation.  Support systems: family at bedside.  Outpatient Referrals: n/a    Time Spent on this Encounter   Total unit/floor time 60 minutes, time consisted of the following, examination of the patient, reviewing the record and completing documentation. >50% of time spent in counseling and coordination of care.  Time spend counseling with patient and family consisted of the following topics, symptom management.  Time spent in coordination of care with Bedside Nurse Cj..     Radha DE LEON, CNP  Pain Management and Palliative Care  [facility]  Pgr: [phone]      Reason for Consult   Reason for consult: I was asked by Haylie SHER to evaluate this patient for acute post operative pain.    Primary Care Physician   Primary Care Physician:Osiris Faust  Pain Specialist: n/a    Chief Complaint   S/p T6-8 spinal fusion    History is obtained from the patient    History of Present Illness   Hong Santiago is a 28 year old male with 13 year history of thoracic back pain who presented for T6-8 fusion by Dr. Mooney on 9/19.  Patient had no immediate complications from surgery and was admitted to hospital  for pain management and post -op recovery.  Patient states that his back pain and SI joint pain began when he was 11 years old.  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  He feels his back problems began when he was in highschool, states he played baseball and that pitching probably contributed to the degenerative disc disease.  He states that he works as a  for the [employer] in their [place] location and that the manual labor on hard concrete floors contributes to his back pain.  At times he has missed work due to his pain flares when he over does things.  He states that the pain will 'catch' when he takes big breaths at times.  States it has impacted his ability to walk for long periods.    He had exhausted conservative options for pain control and determined that surgery was his best option.    CURRENT PAIN:  His pain is located in the thoracic back  It is described as Aching, Burning and Shooting  He rates it as ranging between 4/10 and 10/10  The average is 6/10 on a scale of 0-10  Currently it is rated as 5/10  It improves by medication, rest, ice.  It worsens by movement.  He has been compliant with the recommendations while in the hospital.      PAIN HISTORY:  The pain is mainly located in the back and hips  It is described as Aching, Burning, Sharp and Tiring  Rates it as ranging between 4/10 and 8/10  Average is rated as 4/10  It improves by rest  It worsens by over-exertion.   He has been compliant with the recommendations as an outpatient.    PAST PAIN TREATMENT:   Medications: gabapentin (no relief), lyrica (no relief), opiates (minimal relief), steroids (temporary relief).  Non-phamacologic modalities: physical therapy, TENS, steroid injections  Previous interventions/surgeries: none - only current surgery for which he is hospitalized.    [facility] database review: no opiate prescriptions in the past 12 months.    Past Medical History   I  have reviewed this patient's medical history and updated it with pertinent information if needed.   Past Medical History:   Diagnosis Date     Anxiety        Past Surgical History   I have reviewed this patient's surgical history and updated it with pertinent information if needed.  Past Surgical History:   Procedure Laterality Date     ORTHOPEDIC SURGERY Right 2010    bunion         Prior to Admission Medications   Prior to Admission Medications   Prescriptions Last Dose Informant Patient Reported? Taking?   HEMP OIL OR EXTRACT OR OTHER CBD CANNABINOID, NOT MEDICAL CANNABIS, 9/18/2019 at 1000  Yes Yes   clonazePAM (KLONOPIN) 0.25 mg TABS half-tab 9/19/2019 at 0700  Yes Yes   Sig: Take 0.25 mg by mouth 2 times daily   clonazePAM (KLONOPIN) 1 MG tablet 9/18/2019 at 2200  Yes Yes   Sig: Take 1 mg by mouth At Bedtime    fluvoxaMINE (LUVOX) 100 MG tablet 9/18/2019 at 2200  Yes Yes   Sig: Take 300 mg by mouth At Bedtime       Facility-Administered Medications: None     Allergies   No Known Allergies    Social History   I have reviewed this patient's social history and updated it with pertinent information if needed. Hong Santiago  reports that he has been smoking other.  He has quit using smokeless tobacco. His smokeless tobacco use included chew. He reports that he drinks alcohol. He reports that he does not use drugs.    Family History   I have reviewed this patient's family history and updated it with pertinent information if needed.   Family History   Problem Relation Age of Onset     Depression Mother      No Known Problems Father      No Known Problems Sister      Bipolar Disorder Brother      Alcoholism Brother      Family history of addiction no    Review of Systems   The 10 point Review of Systems is negative other than noted in the HPI or here.    Denies Bowel or bladder dysfunction    Physical Exam   Temp:  [97  F (36.1  C)-98.5  F (36.9  C)] 98.5  F (36.9  C)  Pulse:  [] 72  Heart Rate:  [56-97]  92  Resp:  [8-28] 16  BP: (115-149)/(46-98) 139/83  FiO2 (%):  [100 %] 100 %  SpO2:  [93 %-100 %] 93 %  210 lbs 0 oz  GEN:  Alert, oriented x 3, appears comfortable, No apparent distress.  HEENT:  Normocephalic/atraumatic, no scleral icterus, no nasal discharge, mouth moist.  CV:  RRR, S1, S2; no murmurs or other irregularities noted.  +3 DP/PT pulses bilaterally; no edema bilateral lower extremeties.  RESP:  Clear to auscultation bilaterally without rales/rhonchi/wheezing/retractions.  Symmetric chest rise on inhalation noted.  Normal respiratory effort.  ABD:  Rounded, soft, non-tender/non-distended.  +BS  EXT:  Edema & pulses as noted above.  Color, moisture and sensation intact x 4.     M/S:   No tenderness on exam.    SKIN:  Dry to touch, no exanthems noted in the visualized areas.    NEURO: Symmetric strength +5/5.  Sensation to touch intact all extremities.   There is no area of allodynia or hyperesthesia.  PAIN BEHAVIOR: Cooperative  Psych:  Normal affect.  Calm, cooperative, conversant appropriately.     Data   Most Recent 3 CBC's:  Recent Labs   Lab Test 09/20/19  0540 09/10/19 04/23/18  1100   WBC  --  7.3 5.7   HGB 14.1 16.0 15.5   MCV  --  93.2 92.1   PLT  --  194 240     Most Recent 3 BMP's:  Recent Labs   Lab Test 09/20/19  0540 09/10/19    140.6   POTASSIUM 4.2 4.16   CHLORIDE 106 101.0   CO2 32 31.8   BUN 10 15  13.9   CR 0.88 1.08   ANIONGAP 3  --    KATHLEEN 9.1 10.2   * 98

## 2019-09-20 NOTE — PLAN OF CARE
Arrived to room @ 1755, oriented to room and call light system. A&O. VSS. 2 L nasal cannula. Hypo bs, has only had a popsicle and water so far. CMS intact. Dressing to back is CDI. Has yet to dangle d/t uncontrolled pain, pain team consult placed. Rodriguez patent. Oxycodone increased to 10-15mg. Given oxy, vistaril, and toradol for pain. Rating pain consistently around 6-7. Will continue to monitor.

## 2019-09-20 NOTE — PLAN OF CARE
A/O. CMS intact.  Dressing D/I.  Pain regimen of Tylenol/Oxycodone/Flexeril. Did receive IV dilaudid x1 today. Rodriguez discontinued at 9AM, due to void-no urge yet. Encouraged fluids. Up with SBA and brace. Family remains at bedside. Plans to discharge home tomorrow morning.  Will continue to monitor.

## 2019-09-20 NOTE — PLAN OF CARE
OT- eval completed and treatment initiated.  Patient is POD #1 s/p T6-8 spinal fusion. Prior to admission patient was living with his parents in multi level home and working fulltime as a      Discharge Planner OT   Patient plan for discharge: home  Current status: Educated in and provided spine handouts for spine precautions, proper body mechanics, EC/WS techniques, advancement of activity following surgery, car transfers, driving readiness and AE recommendations, throughout education, patient was engaged in instruction and verbalized understanding. After instruction, patient was I with donning undergarments and min A to lopez brace and don shirt due to increased pain in L ribs. Provided alternative ways to complete to reduce pain, patient was receptive, however family will be able to A until pain subsides. SBA for bed mobility, sit<>stand and functional mobility in room, mckeon and satellite. Educated in bathroom AE (RTS and shower chair) and how to obtain in community. SBA for higher toilet transfer and tub/shower combination transfer. Patient to order reacher, long handle sponge and toilet tongs. Educated in use and purpose of toilet tongs.   Barriers to return to prior living situation: none from OT standpoint  Recommendations for discharge: home with parents to A with IADL's- household chores, driving, errands, cooking and bathing. Recommended AE; shower chair, RTS, reacher, LHS and toileting aid, patient has all other needed AE.   Rationale for recommendations: goals met for safe return home with family support, will discharge from OT services.        Entered by: Manisha Lowe 09/20/2019 3:30 PM       Occupational Therapy Discharge Summary    Reason for therapy discharge:    All goals and outcomes met, no further needs identified.    Progress towards therapy goal(s). See goals on Care Plan in [facility] electronic health record for goal details.  Goals met    Therapy recommendation(s):    No further therapy is  recommended.

## 2019-09-20 NOTE — PHARMACY-ADMISSION MEDICATION HISTORY
Medication history and patient interview completed by pre-admitting RN.  Reviewed by pharmacist, including [employer] dispense records and chart review.     Nurse Complete Set By: Hillary Joseph RN at 09/03/2019 1:55 PM      Curtis Nieves, Pharm.D.        Prior to Admission medications    Medication Sig Last Dose Taking? Auth Provider   clonazePAM (KLONOPIN) 0.25 mg TABS half-tab Take 0.25 mg by mouth 2 times daily 9/19/2019 at 0700 Yes Reported, Patient   clonazePAM (KLONOPIN) 1 MG tablet Take 1 mg by mouth At Bedtime  9/18/2019 at 2200 Yes Reported, Patient   fluvoxaMINE (LUVOX) 100 MG tablet Take 300 mg by mouth At Bedtime  9/18/2019 at 2200 Yes Osiris Faust PA   HEMP OIL OR EXTRACT OR OTHER CBD CANNABINOID, NOT MEDICAL CANNABIS,  9/18/2019 at 1000 Yes Reported, Patient

## 2019-09-20 NOTE — PLAN OF CARE
[facility] Orthopedic Nursing Progress Note       Assessment   Assessment:  Post-operative day #1  T6-8 discectomy interbody posterolateral fusion, minimally invasive     Doing well.  No immediate surgical complications identified.  No excessive bleeding    CMS: is intact. Calves non-tender bilaterally.  Lungs: are clear, encouraged to use incentive spirometer w/a. C/o being able to only take 1/3 of a full breath before he has chest pain. Sat good on 2L/NC. Hospitalist informed this am and CXR ordered and completed.  BS: active, no flatus yet  Urine: adequate output per caballero  Surgical Site: Dressing is clean dry intact.   Pain: controlled with po and IV pain meds. Ice to incisional area.   Activity: bedrest throughout night. Up in chair this am for xray, brace applied- opted to remain up in chair stating it is more comfortable than bed.  Slept well.      Garcia Garcias RN

## 2019-09-20 NOTE — PROVIDER NOTIFICATION
REASON FOR CONTACT:  pt had EKG, CXR and CT in PACU yest. CT neg for pneumo but did show small amount air in left pleural. Pt continues to state that he can only take about 1/3 of a breath before he has chest pain. sats good on 2L.  PROVIDER CONTACTED: admitting hospitalist   TIME CONTACTED: now  MODE OF CONTACT: WBT

## 2019-09-20 NOTE — PROGRESS NOTES
Notified of chest pain whilst taking a breath. CT chest 9/19 showed small PTX. HD stable.    A/P - will get CXR 2 views.

## 2019-09-20 NOTE — PROGRESS NOTES
09/20/19 1518   Quick Adds   Type of Visit Initial Occupational Therapy Evaluation   Living Environment   Lives With parent(s)   Living Arrangements house   Home Accessibility stairs to enter home;stairs within home   Number of Stairs, Main Entrance 1   Number of Stairs, Within Home, Primary   (12)   Living Environment Comment Pt's bedroom is on LL but plans to stay on main level initially. Bathroom set up includes small walk in shower on LL, tub shower on upper level, standard height toilet, no grab bars.    Self-Care   Usual Activity Tolerance good   Current Activity Tolerance good   Regular Exercise No   Equipment Currently Used at Home none   Activity/Exercise/Self-Care Comment works as  at [employer] office- pt states he is on his feet all day   Functional Level   Ambulation 0-->independent   Transferring 0-->independent   Toileting 0-->independent   Bathing 0-->independent   Dressing 0-->independent   Eating 0-->independent   Communication 0-->understands/communicates without difficulty   Swallowing 0-->swallows foods/liquids without difficulty   Cognition 0 - no cognition issues reported   Fall history within last six months no   Which of the above functional risks had a recent onset or change? ambulation;transferring;toileting;bathing;dressing   General Information   Onset of Illness/Injury or Date of Surgery - Date 09/19/19   Referring Physician Dr. Lopez   Patient/Family Goals Statement to return home   Additional Occupational Profile Info/Pertinent History of Current Problem Patient is POD #1 s/p T6-8 spinal fusion   Precautions/Limitations fall precautions;spinal precautions  (brace)   General Observations patient was in bed and agreeable to OT session   Cognitive Status Examination   Orientation orientation to person, place and time   Level of Consciousness alert   Follows Commands (Cognition) WNL   Memory intact   Attention No deficits were identified   Organization/Problem Solving No deficits  were identified   Visual Perception   Visual Perception Wears glasses   Sensory Examination   Sensory Quick Adds No deficits were identified   Pain Assessment   Patient Currently in Pain Yes, see Vital Sign flowsheet  (rating 6/10)   Integumentary/Edema   Integumentary/Edema no deficits were identifed   Posture   Posture not impaired   Range of Motion (ROM)   ROM Quick Adds No deficits were identified   Strength   Manual Muscle Testing Quick Adds No deficits were identified   Hand Strength   Hand Strength Comments intact   Muscle Tone Assessment   Muscle Tone Quick Adds No deficits were identified   Coordination   Upper Extremity Coordination No deficits were identified   Mobility   Bed Mobility Comments I with bed mobility   Transfer Skill: Bed to Chair/Chair to Bed   Level of Addison: Bed to Chair stand-by assist   Transfer Skill: Sit to Stand   Level of [place]: Sit/Stand stand-by assist   Toilet Transfer   Toilet Transfer Comments teatment initiated-defer to OT daily note for detaila   Tub/Shower Transfer   Tub/Shower Transfer Comments teatment initiated-defer to OT daily note for detaila   Balance   Balance Comments LOB was not noted, general unsteadiness   Upper Body Dressing   Level of Addison: Dress Upper Body   (teatment initiated-defer to OT daily note for detaila)   Lower Body Dressing   Level of Addison: Dress Lower Body   (teatment initiated-defer to OT daily note for detaila)   Eating/Self Feeding   Level of Addison: Eating independent   Instrumental Activities of Daily Living (IADL)   IADL Comments family to complete   Activities of Daily Living Analysis   Impairments Contributing to Impaired Activities of Daily Living balance impaired;pain;post surgical precautions;ROM decreased;strength decreased   General Therapy Interventions   Planned Therapy Interventions ADL retraining;progressive activity/exercise;transfer training   Clinical Impression   Criteria for Skilled  "Therapeutic Interventions Met yes, treatment indicated   OT Diagnosis decreased ADL\"s   Influenced by the following impairments balance impaired;pain;post surgical precautions;ROM decreased;strength decreased   Assessment of Occupational Performance 5 or more Performance Deficits   Identified Performance Deficits decreased ADL's and IADL's- dsg, toileting, bathing, functional/community mobility, work duties, household chores, errands   Clinical Decision Making (Complexity) Low complexity   Therapy Frequency Daily   Predicted Duration of Therapy Intervention (days/wks) 1 time session   Anticipated Equipment Needs at Discharge bath sponge;reacher;shower chair;raised toilet seat;other (see comments)  (toilet tongs)   Anticipated Discharge Disposition Home   Risks and Benefits of Treatment have been explained. Yes   Patient, Family & other staff in agreement with plan of care Yes   [facility] TM \"6 Clicks\"   2016, Trustees of [facility], under license to [employer].  All rights reserved.   6 Clicks Short Forms Daily Activity Inpatient Short Form   [facility]  \"6 Clicks\" Daily Activity Inpatient Short Form   1. Putting on and taking off regular lower body clothing? 4 - None   2. Bathing (including washing, rinsing, drying)? 3 - A Little   3. Toileting, which includes using toilet, bedpan or urinal? 3 - A Little   4. Putting on and taking off regular upper body clothing? 3 - A Little   5. Taking care of personal grooming such as brushing teeth? 4 - None   6. Eating meals? 4 - None   Daily Activity Raw Score (Score out of 24.Lower scores equate to lower levels of function) 21   Total Evaluation Time   Total Evaluation Time (Minutes) 10     "

## 2019-09-20 NOTE — CONSULTS
Care Transitions Team: Following for CC, discharge planning, and disposition.        Per chart review MD has consulted [facility] for potential for disposition concerns.     Per PT  Assessment:   Barriers to return to prior living situation: none anticipated  Recommendations for discharge: Home with assist of parents as needed for household tasks, transportation  Rationale for recommendations: Patient mobility appropriate for discharge home with assist of parents for household tasks, transportation, mobility as needed. Will continue to see in IP setting 2x/day to progress dynamic balance and independence with mobility.       Entered by: Anna Guadarrama 09/20/2019 9:58 AM      Please re- consult CM if there are changes/concern with above plan.    Marva Forman RN, BSN CTS  Care Coordinator  [phone]

## 2019-09-21 VITALS
DIASTOLIC BLOOD PRESSURE: 66 MMHG | WEIGHT: 210 LBS | HEIGHT: 73 IN | OXYGEN SATURATION: 92 % | HEART RATE: 72 BPM | TEMPERATURE: 98.4 F | BODY MASS INDEX: 27.83 KG/M2 | SYSTOLIC BLOOD PRESSURE: 126 MMHG | RESPIRATION RATE: 16 BRPM

## 2019-09-21 LAB
GLUCOSE SERPL-MCNC: 109 MG/DL (ref 70–99)
HGB BLD-MCNC: 13 G/DL (ref 13.3–17.7)

## 2019-09-21 PROCEDURE — 25000132 ZZH RX MED GY IP 250 OP 250 PS 637: Performed by: NEUROLOGICAL SURGERY

## 2019-09-21 PROCEDURE — 99232 SBSQ HOSP IP/OBS MODERATE 35: CPT | Performed by: STUDENT IN AN ORGANIZED HEALTH CARE EDUCATION/TRAINING PROGRAM

## 2019-09-21 PROCEDURE — 82947 ASSAY GLUCOSE BLOOD QUANT: CPT | Performed by: PHYSICIAN ASSISTANT

## 2019-09-21 PROCEDURE — 25000132 ZZH RX MED GY IP 250 OP 250 PS 637: Performed by: INTERNAL MEDICINE

## 2019-09-21 PROCEDURE — 25000132 ZZH RX MED GY IP 250 OP 250 PS 637: Performed by: PHYSICIAN ASSISTANT

## 2019-09-21 PROCEDURE — 85018 HEMOGLOBIN: CPT | Performed by: PHYSICIAN ASSISTANT

## 2019-09-21 PROCEDURE — 36415 COLL VENOUS BLD VENIPUNCTURE: CPT | Performed by: PHYSICIAN ASSISTANT

## 2019-09-21 PROCEDURE — 25000132 ZZH RX MED GY IP 250 OP 250 PS 637: Performed by: STUDENT IN AN ORGANIZED HEALTH CARE EDUCATION/TRAINING PROGRAM

## 2019-09-21 RX ORDER — OXYCODONE HYDROCHLORIDE 5 MG/1
15-20 TABLET ORAL
Status: DISCONTINUED | OUTPATIENT
Start: 2019-09-21 | End: 2019-09-21 | Stop reason: HOSPADM

## 2019-09-21 RX ORDER — AMOXICILLIN 250 MG
2 CAPSULE ORAL DAILY PRN
Qty: 20 TABLET | Refills: 0 | Status: SHIPPED | OUTPATIENT
Start: 2019-09-21 | End: 2020-09-23

## 2019-09-21 RX ORDER — OXYCODONE HYDROCHLORIDE 10 MG/1
10-20 TABLET ORAL EVERY 4 HOURS PRN
Qty: 60 TABLET | Refills: 0 | Status: SHIPPED | OUTPATIENT
Start: 2019-09-21 | End: 2020-09-23

## 2019-09-21 RX ADMIN — OXYCODONE HYDROCHLORIDE 15 MG: 5 TABLET ORAL at 00:25

## 2019-09-21 RX ADMIN — ACETAMINOPHEN 975 MG: 325 TABLET, FILM COATED ORAL at 06:45

## 2019-09-21 RX ADMIN — OXYCODONE HYDROCHLORIDE 15 MG: 5 TABLET ORAL at 06:45

## 2019-09-21 RX ADMIN — DICLOFENAC 2 G: 10 GEL TOPICAL at 09:16

## 2019-09-21 RX ADMIN — OXYCODONE HYDROCHLORIDE 20 MG: 5 TABLET ORAL at 13:07

## 2019-09-21 RX ADMIN — CLONAZEPAM 0.25 MG: 0.25 TABLET, ORALLY DISINTEGRATING ORAL at 08:12

## 2019-09-21 RX ADMIN — RANITIDINE 150 MG: 150 TABLET ORAL at 08:12

## 2019-09-21 RX ADMIN — SENNOSIDES AND DOCUSATE SODIUM 2 TABLET: 8.6; 5 TABLET ORAL at 08:11

## 2019-09-21 RX ADMIN — HYDROXYZINE HYDROCHLORIDE 25 MG: 25 TABLET ORAL at 00:25

## 2019-09-21 RX ADMIN — OXYCODONE HYDROCHLORIDE 15 MG: 5 TABLET ORAL at 09:54

## 2019-09-21 RX ADMIN — OXYCODONE HYDROCHLORIDE 15 MG: 5 TABLET ORAL at 03:45

## 2019-09-21 RX ADMIN — HYDROXYZINE HYDROCHLORIDE 25 MG: 25 TABLET ORAL at 08:19

## 2019-09-21 ASSESSMENT — ACTIVITIES OF DAILY LIVING (ADL)
ADLS_ACUITY_SCORE: 14

## 2019-09-21 NOTE — PROGRESS NOTES
DAILY PROGRESS NOTE    Hong Santiago is a 28 year old old male admitted on 9/19/2019  8:20 AM.    Subjective  Some axial pain      Objective    AAOx3, WAN , f/c 4/4   Ambulating,     Hemoglobin   Date Value Ref Range Status   09/21/2019 13.0 (L) 13.3 - 17.7 g/dL Final   ]      Impression / Plan       Plan for today:    Overall doing well with usual post op surgical pain as expected,   discharge home today

## 2019-09-21 NOTE — PLAN OF CARE
Pt A/O x4.  VSS and afebrile.  Pain managed adequately with PRN PO oxycodone (15mg) and scheduled voltaren gel.  CMS intact.  Dressing CDI.  Up SBA with walker and back brace.  Voiding adequately.  Tolerating regular diet well.  Plan is home on discharge.  Will continue to monitor.

## 2019-09-21 NOTE — PLAN OF CARE
A&O x4. Up w/SBA w/brace. BS/flatus+. Tolerating regular diet well. Voiding well. CMS intact. Dressing CDI. Pain managed with PRN oxycodone, atarax, and scheduled Tylenol. Plans to discharge home today.

## 2019-09-21 NOTE — PROGRESS NOTES
[facility]    Medicine Progress Note - Hospitalist Service       Date of Admission:  9/19/2019  Date of Service: 09/21/2019    Assessment & Plan      Hong Santiago is a 28 year old male with a history of anxiety, depression and chronic back pain who was admitted for thoracic interbody and posterior lateral fusion, minimally invasive.  I was consulted to help with medical comanagement postoperatively.  He was noted to have what appears to be a tiny pneumothorax on CT scan of no apparent clinical significance.  Repeat chest x-ray this morning was negative for pneumothorax.  Suspect this may have been related to some very mild barotrauma.  I recommended he follow-up on this in clinic in a couple weeks with a chest x-ray to make sure it remains resolved.  No prior personal or family issues with pneumothoraces.     1.  Chronic back pain s/p thoracic 6 through 8 interbody and posterior lateral fusion: Defer pain control, activity and DVT prophylaxis to primary surgical team. Oxycodone increased to 15-20 mg every 3 hours. Hopefully pain will be controlled for discharge this evening. Otherwise may need additional day for titration of pain control.      2.  Incidental finding of tiny pneumothorax on CT of his chest which I believe is obtained for imaging of his thoracic spine: Discussed the findings with him personally.  Not present on the chest x-ray today and frankly extremely difficult to even see on the CT scan.  Given no prior history of pneumothoraces in him or his family I suspect this is probably due more to barotrauma than anything.  I recommended he follow-up for repeat chest x-ray in 1 to 2 weeks in clinic and certainly be seen if he develops respiratory problems.            Diet: Advance Diet as Tolerated: Regular Diet Adult  Diet    DVT Prophylaxis: Low Risk/Ambulatory with no VTE prophylaxis indicated  Rodriguez Catheter: not present  Code Status: Full Code      Disposition Plan   Expected  discharge: Today, recommended to prior living arrangement once adequate pain management/ tolerating PO medications.  Entered: Ozzy Pruitt MD 09/21/2019, 10:22 AM       The patient's care was discussed with the Bedside Nurse, Patient and Patient's Family.    Ozzy Pruitt MD  Hospitalist Service  [facility]    ______________________________________________________________________    Interval History     Pain 5-6/10, patient tearful that he was hoping to be able to go home but unsure that his pain is controlled well enough for discharge. No CP/SOB. No urinary complaints. No fevers or chills. No new complaints.     Data reviewed today: I reviewed all medications, new labs and imaging results over the last 24 hours. I personally reviewed no images or EKG's today.    Physical Exam   Vital Signs: Temp: 98.4  F (36.9  C) Temp src: Oral BP: 126/66   Heart Rate: 74 Resp: 16 SpO2: 92 % O2 Device: None (Room air)    Weight: 210 lbs 0 oz    GENERAL: No apparent distress. Awake, alert, and fully oriented.  HEENT: Normocephalic, atraumatic. Extraocular movements intact.  CARDIOVASCULAR: Regular rate and rhythm without murmurs or rubs. No S3.  PULMONARY: Clear bilaterally.  Good air movement throughout including at lung apices  ABDOMINAL: Soft, non-tender, non-distended. Bowel sounds normoactive. No hepatosplenomegaly.  EXTREMITIES: No cyanosis or clubbing. No edema.  NEUROLOGICAL: CN 2-12 grossly intact, no focal neurological deficits.  DERMATOLOGICAL: No rash, ulcer, ecchymoses, jaundice.    Data   Recent Labs   Lab 09/21/19  0807 09/20/19  0540 09/18/19  1251   HGB 13.0* 14.1  --    INR  --   --  1.06   NA  --  141  --    POTASSIUM  --  4.2  --    CHLORIDE  --  106  --    CO2  --  32  --    BUN  --  10  --    CR  --  0.88  --    ANIONGAP  --  3  --    KATHLEEN  --  9.1  --    * 123*  --      No results found for this or any previous visit (from the past 24 hour(s)).  Medications       acetaminophen  975 mg Oral  Q8H     clonazePAM  0.25 mg Oral BID     clonazePAM  1 mg Oral At Bedtime     diclofenac  2 g Transdermal TID     ranitidine  150 mg Oral Q12H    Or     famotidine  20 mg Intravenous Q12H     fluvoxaMINE  300 mg Oral At Bedtime     gabapentin  300 mg Oral At Bedtime     lidocaine  2 patch Transdermal Q24h    And     lidocaine   Transdermal Q24H    And     lidocaine   Transdermal Q8H     senna-docusate  1 tablet Oral BID    Or     senna-docusate  2 tablet Oral BID     sodium chloride (PF)  3 mL Intracatheter Q8H

## 2019-09-21 NOTE — PROVIDER NOTIFICATION
Page sent to Dr. Pruitt-pt is ready for discharge today if cleared medically.  Request also to finalize med rec.

## 2019-09-21 NOTE — PROGRESS NOTES
Discharge instructions given to pt and family.  Reviewed specific instructions from Dr. Falk's website as well including dressing care, precautions, and follow up. Verbalize understanding.  Extra dressing supplies given to pt along with prescriptions for Voltaren gel, stool softeners, Flexeril, Atarax, and Oxycodone.  Personal belongings gathered.  Pt escorted via w/c accompanied by NA to exit.  Leaves for home with family.

## 2019-09-24 ENCOUNTER — CARE COORDINATION (OUTPATIENT)
Dept: FAMILY MEDICINE | Facility: CLINIC | Age: 29
End: 2019-09-24

## 2019-09-24 NOTE — PROGRESS NOTES
Care Coordination Assessment    PCP: Osiris Faust    Referral Source:  ED/IP List  Clinical Data: Patient discharged from inpatient at [facility] S/P T6-T8 Ddd fusion of thoracic spine.  Patient discharged home with instructions to follow up with PCP in 2 wks for a wound check and surgeon in 3 months    Plan: I will forward to Karly in clinical support to assess and assist with appropriate follow up.

## 2019-09-28 ENCOUNTER — HEALTH MAINTENANCE LETTER (OUTPATIENT)
Age: 29
End: 2019-09-28

## 2019-09-30 ENCOUNTER — OFFICE VISIT (OUTPATIENT)
Dept: FAMILY MEDICINE | Facility: CLINIC | Age: 29
End: 2019-09-30

## 2019-09-30 VITALS
BODY MASS INDEX: 27.83 KG/M2 | DIASTOLIC BLOOD PRESSURE: 78 MMHG | SYSTOLIC BLOOD PRESSURE: 136 MMHG | RESPIRATION RATE: 20 BRPM | WEIGHT: 210 LBS | TEMPERATURE: 97.8 F | HEART RATE: 108 BPM | OXYGEN SATURATION: 98 % | HEIGHT: 73 IN

## 2019-09-30 DIAGNOSIS — R06.00 DYSPNEA, UNSPECIFIED TYPE: Primary | ICD-10-CM

## 2019-09-30 DIAGNOSIS — F41.1 GAD (GENERALIZED ANXIETY DISORDER): ICD-10-CM

## 2019-09-30 DIAGNOSIS — Z98.1 H/O SPINAL FUSION: ICD-10-CM

## 2019-09-30 PROCEDURE — 99213 OFFICE O/P EST LOW 20 MIN: CPT | Performed by: PHYSICIAN ASSISTANT

## 2019-09-30 PROCEDURE — 71046 X-RAY EXAM CHEST 2 VIEWS: CPT | Performed by: PHYSICIAN ASSISTANT

## 2019-09-30 RX ORDER — DEXTROAMPHETAMINE SACCHARATE, AMPHETAMINE ASPARTATE, DEXTROAMPHETAMINE SULFATE AND AMPHETAMINE SULFATE 5; 5; 5; 5 MG/1; MG/1; MG/1; MG/1
1 TABLET ORAL EVERY 24 HOURS
COMMUNITY
End: 2020-09-23

## 2019-09-30 RX ORDER — ALBUTEROL SULFATE 90 UG/1
AEROSOL, METERED RESPIRATORY (INHALATION)
COMMUNITY
End: 2020-09-23

## 2019-09-30 ASSESSMENT — MIFFLIN-ST. JEOR: SCORE: 1966.43

## 2019-09-30 NOTE — NURSING NOTE
Hong Santiago is here for a F/U for his pneumothorax.    Questioned patient about current smoking habits.  Pt. currently smokes.  Advised about smoking cessation.  PULSE regular  My Chart: active  CLASSIFICATION OF OVERWEIGHT AND OBESITY BY BMI                        Obesity Class           BMI(kg/m2)  Underweight                                    < 18.5  Normal                                         18.5-24.9  Overweight                                     25.0-29.9  OBESITY                     I                  30.0-34.9                             II                 35.0-39.9  EXTREME OBESITY             III                >40                            Patient's  BMI Body mass index is 27.71 kg/m .  http://hin.nhlbi.nih.gov/menuplanner/menu.cgi  Pre-visit planning  Immunizations - up to date  Colonoscopy -   Mammogram -   Asthma -   PHQ9 -    EDWARD-7 -

## 2019-09-30 NOTE — PROGRESS NOTES
Subjective     Chief Complaint   Patient presents with     Hospital F/U       Hong Santiago is a 28 year old male who presents to clinic today for the following health issues:    HPI     This patient is accompanied in the office by his father.    Que is here for Follow-up CXR and wound check after spinal fusion.  See Epic notes. Small Pneumothorax seen on CT, Follow-up CXR showed resolution.    Pt continues to have moderate SOB and back pain around incision site.   No family hx of PEs known.    No mitzi fevers but states he will get hot/cold.   10/18 Follow-up with surgeon    T6-T8 spinal fusion    Patient Active Problem List   Diagnosis     EDWARD (generalized anxiety disorder)     Health Care Home     ACP (advance care planning)     Depressive disorder     Other malaise     S/P fusion of thoracic spine     Past Surgical History:   Procedure Laterality Date     FUSION SPINE POSTERIOR MINIMALLY INVASIVE TWO LEVELS N/A 9/19/2019    Procedure: Thoracic 6-8 interbody and posterolateral fusion, minimally invasive epidural steroid injection;  Surgeon: Bobo Lopez MD;  Location: [facility] OR     ORTHOPEDIC SURGERY Right 2010    [facility]       Social History     Tobacco Use     Smoking status: Current Some Day Smoker     Types: Other     Smokeless tobacco: Former User     Types: Chew     Tobacco comment: e-cig   Substance Use Topics     Alcohol use: Yes     Comment: 2 drinks per week     Family History   Problem Relation Age of Onset     Depression Mother      No Known Problems Father      No Known Problems Sister      Bipolar Disorder Brother      Alcoholism Brother            Current Outpatient Medications   Medication Sig Dispense Refill     acetaminophen-codeine (TYLENOL #3) 300-30 MG tablet Take 1 tablet by mouth every 6 hours as needed       albuterol (PROAIR HFA) 108 (90 Base) MCG/ACT inhaler ProAir HFA 90 mcg/actuation aerosol inhaler       amphetamine-dextroamphetamine (ADDERALL) 20 MG tablet Take 1 tablet by mouth  "every 24 hours       clonazePAM (KLONOPIN) 0.25 mg TABS half-tab Take 0.25 mg by mouth 2 times daily       clonazePAM (KLONOPIN) 1 MG tablet Take 1 mg by mouth At Bedtime        cyclobenzaprine (FLEXERIL) 10 MG tablet Take 1 tablet (10 mg) by mouth 3 times daily as needed for muscle spasms 60 tablet 3     diclofenac (VOLTAREN) 1 % topical gel Place 2 g onto the skin 3 times daily 100 g 0     fluvoxaMINE (LUVOX) 100 MG tablet Take 300 mg by mouth At Bedtime        HEMP OIL OR EXTRACT OR OTHER CBD CANNABINOID, NOT MEDICAL CANNABIS,        hydrOXYzine (ATARAX) 25 MG tablet Take 1 tablet (25 mg) by mouth every 6 hours as needed for itching 60 tablet 3     oxyCODONE (ROXICODONE) 10 MG tablet Take 1-2 tablets (10-20 mg) by mouth every 4 hours as needed for moderate to severe pain or severe pain 60 tablet 0     senna-docusate (SENOKOT-S/PERICOLACE) 8.6-50 MG tablet Take 2 tablets by mouth daily as needed for constipation 20 tablet 0     sertraline (ZOLOFT) 50 MG tablet Take 1 tablet by mouth every 24 hours       No Known Allergies  Recent Labs   Lab Test 09/20/19  0540 09/10/19   CR 0.88 1.08   GFRESTIMATED >90  --    GFRESTBLACK >90  --    POTASSIUM 4.2 4.16        BP Readings from Last 3 Encounters:   09/30/19 136/78   09/21/19 126/66   09/10/19 112/68    Wt Readings from Last 3 Encounters:   09/30/19 95.3 kg (210 lb)   09/19/19 95.3 kg (210 lb)   09/10/19 93.9 kg (207 lb)                      ROS COMP: Constitutional, HEENT, cardiovascular, pulmonary, gi and gu systems are negative, except as otherwise noted.                Objective    /78 (BP Location: Right arm, Patient Position: Chair, Cuff Size: Adult Regular)   Pulse 108   Temp 97.8  F (36.6  C) (Oral)   Resp 20   Ht 1.854 m (6' 1\")   Wt 95.3 kg (210 lb)   SpO2 98%   BMI 27.71 kg/m    Body mass index is 27.71 kg/m .  Physical Exam     GENERAL: healthy, alert and no distress  EYES: Eyes grossly normal to inspection, PERRL and conjunctivae and sclerae " normal  HENT: ear canals and TM's normal, nose and mouth without ulcers or lesions  NECK: no adenopathy, no asymmetry, masses, or scars and thyroid normal to palpation  RESP: lungs clear to auscultation - no rales, rhonchi or wheezes  CV: regular rate and rhythm, normal S1 S2, no S3 or S4, no murmur, click or rub, no peripheral edema and peripheral pulses strong  MS: Pt wearing back brace  SKIN: no suspicious lesions or rashes - Incision site well approximated - covered with steri-strips  NEURO: Normal strength and tone, mentation intact and speech normal  PSYCH: mentation appears normal, affect normal/bright    Attempted to draw labs but unable - pt declines 2nd attempt    CXR: no pneumothorax or infiltrate        Assessment & Plan       ICD-10-CM    1. Dyspnea, unspecified type R06.00 HC XRAY CHEST, 2 VIEWS     CANCELED: HEMOGRAM/PLATE/DIFF     CANCELED: VENOUS COLLECTION   2. H/O spinal fusion Z98.1    3. EDWARD (generalized anxiety disorder) F41.1             Xray reassuring  O2 normal.   Suspect pain due to normal healing  I will see him back Friday to remove sutures  RTC sooner with any concerns        NIKKY Varela  [facility]

## 2019-10-04 ENCOUNTER — OFFICE VISIT (OUTPATIENT)
Dept: FAMILY MEDICINE | Facility: CLINIC | Age: 29
End: 2019-10-04

## 2019-10-04 VITALS
OXYGEN SATURATION: 97 % | BODY MASS INDEX: 27.57 KG/M2 | TEMPERATURE: 98.1 F | SYSTOLIC BLOOD PRESSURE: 138 MMHG | HEART RATE: 88 BPM | WEIGHT: 209 LBS | DIASTOLIC BLOOD PRESSURE: 80 MMHG

## 2019-10-04 DIAGNOSIS — Z98.890 PREVIOUS BACK SURGERY: Primary | ICD-10-CM

## 2019-10-04 PROCEDURE — 99213 OFFICE O/P EST LOW 20 MIN: CPT | Performed by: PHYSICIAN ASSISTANT

## 2019-10-04 NOTE — NURSING NOTE
Chief Complaint   Patient presents with     Suture Removal     Pre-visit Screening:  Immunizations:  up to date  Colonoscopy:  NA  Mammogram: NA  Asthma Action Test/Plan:  NA  PHQ9:  NA  GAD7:  NA  Questioned patient about current smoking habits Pt. quit smoking some time ago.  Ok to leave detailed message on voice mail for today's visit only yes, phone # [phone]

## 2019-10-08 NOTE — PROGRESS NOTES
Subjective     Chief Complaint   Patient presents with     Suture Removal       Hong Santiago is a 28 year old male who presents to clinic today for the following health issues:    HPI       Pt presents for suture removal today. Back surgery last week. See Surgical notes. Was told several times to be seen by PCP for suture removal.  No concerns with incision.       Patient Active Problem List   Diagnosis     EDWARD (generalized anxiety disorder)     Health Care Home     ACP (advance care planning)     Depressive disorder     Other malaise     S/P fusion of thoracic spine     Past Surgical History:   Procedure Laterality Date     FUSION SPINE POSTERIOR MINIMALLY INVASIVE TWO LEVELS N/A 9/19/2019    Procedure: Thoracic 6-8 interbody and posterolateral fusion, minimally invasive epidural steroid injection;  Surgeon: Bobo Lopez MD;  Location: [facility] OR     ORTHOPEDIC SURGERY Right 2010 bunion       Social History     Tobacco Use     Smoking status: Current Some Day Smoker     Types: Other     Smokeless tobacco: Former User     Types: Chew     Tobacco comment: e-cig   Substance Use Topics     Alcohol use: Yes     Comment: 2 drinks per week     Family History   Problem Relation Age of Onset     Depression Mother      No Known Problems Father      No Known Problems Sister      Bipolar Disorder Brother      Alcoholism Brother            Current Outpatient Medications   Medication Sig Dispense Refill     clonazePAM (KLONOPIN) 0.25 mg TABS half-tab Take 0.25 mg by mouth 2 times daily       clonazePAM (KLONOPIN) 1 MG tablet Take 1 mg by mouth At Bedtime        cyclobenzaprine (FLEXERIL) 10 MG tablet Take 1 tablet (10 mg) by mouth 3 times daily as needed for muscle spasms 60 tablet 3     fluvoxaMINE (LUVOX) 100 MG tablet Take 300 mg by mouth At Bedtime        HEMP OIL OR EXTRACT OR OTHER CBD CANNABINOID, NOT MEDICAL CANNABIS,        oxyCODONE (ROXICODONE) 10 MG tablet Take 1-2 tablets (10-20 mg) by mouth every 4 hours as  needed for moderate to severe pain or severe pain 60 tablet 0     acetaminophen-codeine (TYLENOL #3) 300-30 MG tablet Take 1 tablet by mouth every 6 hours as needed       albuterol (PROAIR HFA) 108 (90 Base) MCG/ACT inhaler ProAir HFA 90 mcg/actuation aerosol inhaler       amphetamine-dextroamphetamine (ADDERALL) 20 MG tablet Take 1 tablet by mouth every 24 hours       diclofenac (VOLTAREN) 1 % topical gel Place 2 g onto the skin 3 times daily (Patient not taking: Reported on 10/4/2019) 100 g 0     hydrOXYzine (ATARAX) 25 MG tablet Take 1 tablet (25 mg) by mouth every 6 hours as needed for itching (Patient not taking: Reported on 10/4/2019) 60 tablet 3     senna-docusate (SENOKOT-S/PERICOLACE) 8.6-50 MG tablet Take 2 tablets by mouth daily as needed for constipation (Patient not taking: Reported on 10/4/2019) 20 tablet 0     sertraline (ZOLOFT) 50 MG tablet Take 1 tablet by mouth every 24 hours       No Known Allergies  Recent Labs   Lab Test 09/20/19  0540 09/10/19   CR 0.88 1.08   GFRESTIMATED >90  --    GFRESTBLACK >90  --    POTASSIUM 4.2 4.16        BP Readings from Last 3 Encounters:   10/04/19 138/80   09/30/19 136/78   09/21/19 126/66    Wt Readings from Last 3 Encounters:   10/04/19 94.8 kg (209 lb)   09/30/19 95.3 kg (210 lb)   09/19/19 95.3 kg (210 lb)                      ROS COMP: Constitutional, HEENT, cardiovascular, pulmonary, gi and gu systems are negative, except as otherwise noted.                Objective    /80 (BP Location: Right arm, Patient Position: Sitting, Cuff Size: Adult Large)   Pulse 88   Temp 98.1  F (36.7  C) (Oral)   Wt 94.8 kg (209 lb)   SpO2 97%   BMI 27.57 kg/m    Body mass index is 27.57 kg/m .  Physical Exam       There are well approximated incisions, no erythema, swelling or tenderness of the back. There are no visible nylon sutures present to removed.     Assessment & Plan     1. Previous back surgery    I had my nurse call surgeons office to confirm there are no  sutures that need to be removed. She states Vicryl only was used so he was incorrectly advised to be seen for suture removal.    Continue to monitor for signs of infection.     15 min spent coordinating care of pt      NIKKY Varela  [facility]

## 2020-03-15 ENCOUNTER — HEALTH MAINTENANCE LETTER (OUTPATIENT)
Age: 30
End: 2020-03-15

## 2020-09-23 ENCOUNTER — OFFICE VISIT (OUTPATIENT)
Dept: FAMILY MEDICINE | Facility: CLINIC | Age: 30
End: 2020-09-23

## 2020-09-23 VITALS
BODY MASS INDEX: 27.83 KG/M2 | RESPIRATION RATE: 18 BRPM | TEMPERATURE: 98.1 F | WEIGHT: 210 LBS | HEART RATE: 88 BPM | OXYGEN SATURATION: 98 % | SYSTOLIC BLOOD PRESSURE: 118 MMHG | HEIGHT: 73 IN | DIASTOLIC BLOOD PRESSURE: 78 MMHG

## 2020-09-23 DIAGNOSIS — J20.9 ACUTE BRONCHITIS WITH SYMPTOMS > 10 DAYS: Primary | ICD-10-CM

## 2020-09-23 DIAGNOSIS — J06.9 UPPER RESPIRATORY TRACT INFECTION, UNSPECIFIED TYPE: ICD-10-CM

## 2020-09-23 PROCEDURE — 99213 OFFICE O/P EST LOW 20 MIN: CPT | Performed by: FAMILY MEDICINE

## 2020-09-23 RX ORDER — BENZONATATE 100 MG/1
100 CAPSULE ORAL 3 TIMES DAILY PRN
Qty: 21 CAPSULE | Refills: 0 | Status: SHIPPED | OUTPATIENT
Start: 2020-09-23 | End: 2020-10-08

## 2020-09-23 RX ORDER — AZITHROMYCIN 250 MG/1
TABLET, FILM COATED ORAL
Qty: 6 TABLET | Refills: 0 | Status: SHIPPED | OUTPATIENT
Start: 2020-09-23 | End: 2020-09-28

## 2020-09-23 RX ORDER — CLONAZEPAM 1 MG/1
2 TABLET ORAL AT BEDTIME
COMMUNITY
Start: 2020-09-23 | End: 2021-11-24

## 2020-09-23 RX ORDER — GUAIFENESIN 600 MG/1
1200 TABLET, EXTENDED RELEASE ORAL 2 TIMES DAILY
Qty: 20 TABLET | Refills: 0 | Status: SHIPPED | OUTPATIENT
Start: 2020-09-23 | End: 2020-10-08

## 2020-09-23 ASSESSMENT — MIFFLIN-ST. JEOR: SCORE: 1961.43

## 2020-09-23 NOTE — PROGRESS NOTES
SUBJECTIVE: 29 year old male complaining of nasal congestion, sore throat and cough for 2 day(s).   The patient describes some chills but no fever. Went to [facility] in [place] on state web site and paid $120 for negative test. Works at [employer] and people have been coughing with negative testing. In the past fall he often gets congestion and bronchitis in the past. Clear to green drainage fronm the nose with wet clear cough sputum.  The patient denies a history of GI complaints, sore throat now or Gi symptoms.   Smoking history: Yes: episodic.   Relevant past medical history: positive for fusion of thoracic spine. Anxiety disorder.    OBJECTIVE: The patient appears healthy, alert, no distress, cooperative and smiling.   EARS: negative  NOSE/SINUS: positive findings: mucosa erythematous and swollen, clear rhinorrhea   THROAT: normal and post nasal drainage   NECK:Neck supple. No adenopathy. Thyroid symmetric, normal size,, Carotids without bruits.   CHEST: Regular rate and  rhythm. S1 and S2 normal, no murmurs, clicks, gallops or rubs. No edema or JVD. Chest is clear; no wheezes or rales. Bilateral rhonchi with deep cough      ASSESSMENT: (J20.9) Acute bronchitis with symptoms > 10 days  (primary encounter diagnosis)  Plan: azithromycin (ZITHROMAX) 250 MG tablet,         benzonatate (TESSALON) 100 MG capsule,         guaiFENesin (MUCINEX) 600 MG 12 hr tablet        Symptomatic care with decongestants, fluids, tylenol/advil prn. Use GUAIFENESIN  MG OR TBCR, 1 tab po BID (Twice per day), D: 20, R: 0 for congestion and cough.    In addition, I have suggested that the patient   monitor for symptoms of bacterial infection expecting slow gradual resolution of viral URI as the natural course.      (J06.9) Upper respiratory tract infection, unspecified type  Plan: guaiFENesin (MUCINEX) 600 MG 12 hr tablet        Note for work today

## 2020-09-23 NOTE — LETTER
September 23, 2020      Hong Santiago  [address] [place] [address]        To Whom It May Concern:    Hong Santiago  was seen on 9/23/2020.  Please excuse him  until 9/24/2020 due to illness. He was started on medication and tested negative for coronavirus.        Sincerely,        Shawna Hernandez MD

## 2020-09-23 NOTE — PATIENT INSTRUCTIONS
Note for work    Symptomatic care with decongestants, fluids, tylenol/advil prn. Use GUAIFENESIN  MG OR TBCR, 1 tab po BID (Twice per day), D: 20, R: 0 for congestion and cough.    In addition, I have suggested that the patient   monitor for symptoms of bacterial infection expecting slow gradual resolution of viral URI as the natural course.  Potential medication side effects were discussed with the patient; let me know if any occur.

## 2020-09-23 NOTE — NURSING NOTE
Que is here today for a URI; cough, congestion, sore throat. He had a NEG COVID test yesterday.    Pre-visit Screening:  Immunizations:  up to date  Colonoscopy:  NA  Mammogram: NA  Asthma Action Test/Plan:  NA  PHQ9:  NA  GAD7:  NA  Questioned patient about current smoking habits Pt. smokes on occasion.  Ok to leave detailed message on voice mail for today's visit only Yes, phone # [phone]

## 2020-10-07 ENCOUNTER — TELEPHONE (OUTPATIENT)
Dept: FAMILY MEDICINE | Facility: CLINIC | Age: 30
End: 2020-10-07

## 2020-10-07 NOTE — TELEPHONE ENCOUNTER
Pt called to say he does not feel better after the z-charlene. I scheduled him tomorrow with LES and told him to come to the back door.

## 2020-10-08 ENCOUNTER — OFFICE VISIT (OUTPATIENT)
Dept: FAMILY MEDICINE | Facility: CLINIC | Age: 30
End: 2020-10-08

## 2020-10-08 VITALS
WEIGHT: 217.5 LBS | BODY MASS INDEX: 27.91 KG/M2 | HEIGHT: 74 IN | HEART RATE: 70 BPM | TEMPERATURE: 98.6 F | RESPIRATION RATE: 20 BRPM | SYSTOLIC BLOOD PRESSURE: 106 MMHG | DIASTOLIC BLOOD PRESSURE: 58 MMHG | OXYGEN SATURATION: 98 %

## 2020-10-08 DIAGNOSIS — R05.3 CHRONIC COUGHING: Primary | ICD-10-CM

## 2020-10-08 DIAGNOSIS — J06.9 VIRAL UPPER RESPIRATORY TRACT INFECTION: ICD-10-CM

## 2020-10-08 LAB
% GRANULOCYTES: 61.6 %
HCT VFR BLD AUTO: 45.6 % (ref 40–53)
HEMOGLOBIN: 15.5 G/DL (ref 13.3–17.7)
LYMPHOCYTES NFR BLD AUTO: 30.8 %
MCH RBC QN AUTO: 32.2 PG (ref 26–33)
MCHC RBC AUTO-ENTMCNC: 34 G/DL (ref 31–36)
MCV RBC AUTO: 94.7 FL (ref 78–100)
MONOCYTES NFR BLD AUTO: 7.6 %
PLATELET COUNT - QUEST: 191 10^9/L (ref 150–375)
RBC # BLD AUTO: 4.81 10*12/L (ref 4.4–5.9)
WBC # BLD AUTO: 6.8 10*9/L (ref 4–11)

## 2020-10-08 PROCEDURE — 71046 X-RAY EXAM CHEST 2 VIEWS: CPT | Performed by: FAMILY MEDICINE

## 2020-10-08 PROCEDURE — 85025 COMPLETE CBC W/AUTO DIFF WBC: CPT | Performed by: FAMILY MEDICINE

## 2020-10-08 PROCEDURE — 36415 COLL VENOUS BLD VENIPUNCTURE: CPT | Performed by: FAMILY MEDICINE

## 2020-10-08 PROCEDURE — 99214 OFFICE O/P EST MOD 30 MIN: CPT | Performed by: FAMILY MEDICINE

## 2020-10-08 RX ORDER — BENZONATATE 100 MG/1
100 CAPSULE ORAL 3 TIMES DAILY PRN
Qty: 21 CAPSULE | Refills: 0 | Status: SHIPPED | OUTPATIENT
Start: 2020-10-08 | End: 2021-01-21

## 2020-10-08 RX ORDER — GUAIFENESIN AND DEXTROMETHORPHAN HYDROBROMIDE 600; 30 MG/1; MG/1
1 TABLET, EXTENDED RELEASE ORAL EVERY 12 HOURS
Qty: 20 TABLET | Refills: 0 | Status: SHIPPED | OUTPATIENT
Start: 2020-10-08 | End: 2021-01-21

## 2020-10-08 SDOH — ECONOMIC STABILITY: FOOD INSECURITY: WITHIN THE PAST 12 MONTHS, THE FOOD YOU BOUGHT JUST DIDN'T LAST AND YOU DIDN'T HAVE MONEY TO GET MORE.: NEVER TRUE

## 2020-10-08 SDOH — ECONOMIC STABILITY: INCOME INSECURITY: HOW HARD IS IT FOR YOU TO PAY FOR THE VERY BASICS LIKE FOOD, HOUSING, MEDICAL CARE, AND HEATING?: NOT HARD AT ALL

## 2020-10-08 SDOH — ECONOMIC STABILITY: TRANSPORTATION INSECURITY
IN THE PAST 12 MONTHS, HAS THE LACK OF TRANSPORTATION KEPT YOU FROM MEDICAL APPOINTMENTS OR FROM GETTING MEDICATIONS?: NO

## 2020-10-08 SDOH — ECONOMIC STABILITY: TRANSPORTATION INSECURITY
IN THE PAST 12 MONTHS, HAS LACK OF TRANSPORTATION KEPT YOU FROM MEETINGS, WORK, OR FROM GETTING THINGS NEEDED FOR DAILY LIVING?: NO

## 2020-10-08 SDOH — ECONOMIC STABILITY: FOOD INSECURITY: WITHIN THE PAST 12 MONTHS, YOU WORRIED THAT YOUR FOOD WOULD RUN OUT BEFORE YOU GOT MONEY TO BUY MORE.: NEVER TRUE

## 2020-10-08 ASSESSMENT — MIFFLIN-ST. JEOR: SCORE: 2003.38

## 2020-10-08 NOTE — NURSING NOTE
Que is here for sinus drainage     Pre-visit Screening:  Immunizations:  up to date  Colonoscopy:  NA  Mammogram: NA  Asthma Action Test/Plan:  NA  PHQ9:  NA  GAD7:  NA  Questioned patient about current smoking habits Pt. has never smoked.  Ok to leave detailed message on voice mail for today's visit only Yes, phone # [phone]

## 2020-10-08 NOTE — PROGRESS NOTES
SUBJECTIVE: 29 year old male complaining of nasal congestion, sore throat and cough for 17 day(s).   The patient describes being tested at [facility] in [place] for Covid and negative. Works at the [employer] with multiple coworkers coughing. He has gotten bronchitis in the past and was treated with a Z-pack starting 9/23/2020. Not really making any change  The patient denies a history of fever, SOB or GI symptoms.   Smoking history: Yes: some days.   Relevant past medical history: positive for anxiety and sleep apnea.    OBJECTIVE: The patient appears healthy, alert, no distress, cooperative and fatigued.   EARS: negative  NOSE/SINUS: positive findings: mucosa erythematous and swollen, clear rhinorrhea   THROAT: normal and post nasal drainage   NECK:Neck supple. No adenopathy. Thyroid symmetric, normal size,, Carotids without bruits.   CHEST: Regular rate and  rhythm. S1 and S2 normal, no murmurs, clicks, gallops or rubs. No edema or JVD. Chest is clear; no wheezes or rales.  Dry cough    CBC:WNL  CXR:neg    ASSESSMENT: (R05) Chronic coughing  (primary encounter diagnosis)  Comment: note for work today  Plan: XR Chest 2 Views, HEMOGRAM PLATELET DIFF (BFP),        VENOUS COLLECTION, benzonatate (TESSALON) 100         MG capsule        Symptomatic care with decongestants, fluids, tylenol/advil prn. Use GUAIFENESIN  MG OR TBCR, 1 tab po BID (Twice per day), D: 20, R: 0 for congestion and cough.    In addition, I have suggested that the patient   monitor for symptoms of bacterial infection expecting slow gradual resolution of viral URI as the natural course.      (J06.9) Viral upper respiratory tract infection  Plan: XR Chest 2 Views, benzonatate (TESSALON) 100 MG        capsule, dextromethorphan-guaiFENesin (MUCINEX         DM)  MG 12 hr tablet        As above.

## 2020-10-08 NOTE — PATIENT INSTRUCTIONS
R05) Chronic coughing  (primary encounter diagnosis)  Comment: note for work today  Plan: XR Chest 2 Views, HEMOGRAM PLATELET DIFF (BFP),        VENOUS COLLECTION, benzonatate (TESSALON) 100         MG capsule        Symptomatic care with decongestants, fluids, tylenol/advil prn. Use GUAIFENESIN  MG OR TBCR, 1 tab po BID (Twice per day), D: 20, R: 0 for congestion and cough.    In addition, I have suggested that the patient   monitor for symptoms of bacterial infection expecting slow gradual resolution of viral URI as the natural course.

## 2020-10-08 NOTE — LETTER
October 8, 2020      Hong Santiago  [address] [place] [address]        To Whom It May Concern:    Hong Santiago  was seen on 10/8/2020.    Please excuse him  until 10/9/2020 due to illness.        Sincerely,        Shawna Hernandez MD

## 2021-01-10 ENCOUNTER — HEALTH MAINTENANCE LETTER (OUTPATIENT)
Age: 31
End: 2021-01-10

## 2021-01-21 ENCOUNTER — OFFICE VISIT (OUTPATIENT)
Dept: FAMILY MEDICINE | Facility: CLINIC | Age: 31
End: 2021-01-21

## 2021-01-21 VITALS
TEMPERATURE: 97.3 F | DIASTOLIC BLOOD PRESSURE: 72 MMHG | RESPIRATION RATE: 20 BRPM | BODY MASS INDEX: 28.39 KG/M2 | SYSTOLIC BLOOD PRESSURE: 130 MMHG | HEIGHT: 74 IN | HEART RATE: 68 BPM | WEIGHT: 221.2 LBS

## 2021-01-21 DIAGNOSIS — R06.9 BREATHING PROBLEM: Primary | ICD-10-CM

## 2021-01-21 DIAGNOSIS — M54.6 CHRONIC LEFT-SIDED THORACIC BACK PAIN: ICD-10-CM

## 2021-01-21 DIAGNOSIS — G89.29 CHRONIC LEFT-SIDED THORACIC BACK PAIN: ICD-10-CM

## 2021-01-21 PROCEDURE — 99213 OFFICE O/P EST LOW 20 MIN: CPT | Performed by: FAMILY MEDICINE

## 2021-01-21 PROCEDURE — 71046 X-RAY EXAM CHEST 2 VIEWS: CPT | Performed by: FAMILY MEDICINE

## 2021-01-21 RX ORDER — TRAMADOL HYDROCHLORIDE 50 MG/1
TABLET ORAL
COMMUNITY
End: 2021-01-21

## 2021-01-21 RX ORDER — CYCLOBENZAPRINE HCL 10 MG
TABLET ORAL
COMMUNITY
End: 2021-01-21

## 2021-01-21 RX ORDER — TRAMADOL HYDROCHLORIDE 50 MG/1
TABLET ORAL
COMMUNITY
Start: 2021-01-11 | End: 2021-01-21

## 2021-01-21 RX ORDER — CYCLOBENZAPRINE HCL 10 MG
TABLET ORAL
COMMUNITY
Start: 2021-01-11 | End: 2021-06-10

## 2021-01-21 RX ORDER — CLONAZEPAM 0.5 MG/1
TABLET ORAL
COMMUNITY
Start: 2020-11-22

## 2021-01-21 ASSESSMENT — MIFFLIN-ST. JEOR: SCORE: 2015.17

## 2021-01-21 NOTE — NURSING NOTE
Hong Santiago is here for a consult for his back hard wear. Has some pain where he thinks the hard wear is at. Feels like something is pushing up against his lung.    Questioned patient about current smoking habits.  Pt. currently smokes.  Advised about smoking cessation.  PULSE regular  My Chart: active  CLASSIFICATION OF OVERWEIGHT AND OBESITY BY BMI                        Obesity Class           BMI(kg/m2)  Underweight                                    < 18.5  Normal                                         18.5-24.9  Overweight                                     25.0-29.9  OBESITY                     I                  30.0-34.9                             II                 35.0-39.9  EXTREME OBESITY             III                >40                            Patient's  BMI Body mass index is 28.79 kg/m .  http://hin.nhlbi.nih.gov/menuplanner/menu.cgi  Pre-visit planning  Immunizations - up to date  Colonoscopy -   Mammogram -   Asthma -   PHQ9 -    EDWARD-7 -

## 2021-01-21 NOTE — PROGRESS NOTES
SUBJECTIVE:  Hong Santiago, a 30 year old male scheduled an appointment to discuss the following issues:     Breathing problem  Chronic left-sided thoracic back pain  PT is here due to a chronic complaint of difficulty taking a full breath.  He has thoracic spinal fusion 2019 with Dr Hernandez, had small pneumothorax afterward but this resolved on subsequent CXR.      Pt would like xray to make sure not recurring as he has never felt his breathing was right after that. No wheeze. No shortness of breath     Pt has a lot of pain- this is worsening in thiracic area-he has seen spine again and they are recommending PT first and then discogram    Pt was given Flexeril and Tramadol 1/11/21 and requests other pain meds.  He has been in pain clinic in the past but did not like the process.    He is on chronic klonopin through psych    Medical, social, surgical, and family histories reviewed.  Patient Active Problem List   Diagnosis     EDWARD (generalized anxiety disorder)     Health Care Home     ACP (advance care planning)     Depressive disorder     Other malaise     S/P fusion of thoracic spine     Past Medical History:   Diagnosis Date     Anxiety      Family History   Problem Relation Age of Onset     Depression Mother      No Known Problems Father      No Known Problems Sister      Bipolar Disorder Brother      Alcoholism Brother      Social History     Socioeconomic History     Marital status: Single     Spouse name: Not on file     Number of children: Not on file     Years of education: Not on file     Highest education level: Not on file   Occupational History     Employer: [employer]     Comment: [employer]     Employer: OTHER     Comment: [employer]   Social Needs     Financial resource strain: Not hard at all     Food insecurity     Worry: Never true     Inability: Never true     Transportation needs     Medical: No     Non-medical: No   Tobacco Use     Smoking status: Current Some Day  Smoker     Types: Other     Smokeless tobacco: Former User     Types: Chew     Tobacco comment: e-cig   Substance and Sexual Activity     Alcohol use: Yes     Comment: 2 drinks per week     Drug use: No     Sexual activity: Yes     Partners: Female     Birth control/protection: Condom   Lifestyle     Physical activity     Days per week: Not on file     Minutes per session: Not on file     Stress: Not on file   Relationships     Social connections     Talks on phone: Not on file     Gets together: Not on file     Attends Jewish service: Not on file     Active member of club or organization: Not on file     Attends meetings of clubs or organizations: Not on file     Relationship status: Not on file     Intimate partner violence     Fear of current or ex partner: Not on file     Emotionally abused: Not on file     Physically abused: Not on file     Forced sexual activity: Not on file   Other Topics Concern     Not on file   Social History Narrative     Not on file     Past Surgical History:   Procedure Laterality Date     FUSION SPINE POSTERIOR MINIMALLY INVASIVE TWO LEVELS N/A 9/19/2019    Procedure: Thoracic 6-8 interbody and posterolateral fusion, minimally invasive epidural steroid injection;  Surgeon: Bobo Lopez MD;  Location: [facility] OR     ORTHOPEDIC SURGERY Right 2010    bunion          cyclobenzaprine (FLEXERIL) 10 MG tablet, Take 1 tablet 3 times a day by oral route as needed for 20 days.       HEMP OIL OR EXTRACT OR OTHER CBD CANNABINOID, NOT MEDICAL CANNABIS,,        clonazePAM (KLONOPIN) 0.5 MG tablet, Take 1 tablet by mouth twice a day       clonazePAM (KLONOPIN) 1 MG tablet, Take 2 tablets (2 mg) by mouth At Bedtime       fluvoxaMINE (LUVOX) 100 MG tablet, Take 300 mg by mouth At Bedtime     No current facility-administered medications on file prior to visit.        Allergies: Patient has no known allergies.    Immunization History   Administered Date(s) Administered     DT (PEDS <7y) 11/17/1991,  "02/23/1994     Hep B, Peds or Adolescent 11/01/2000, 02/28/2001, 08/08/2001     Historic Hib Hib-titer 01/17/1991     Historical DTP/aP 02/23/1989, 05/04/1989, 07/24/1989     MMR 04/16/1990, 11/01/2000     OPV, unspecified 02/23/1989, 05/04/1989, 01/17/1991, 02/23/1994     TD (ADULT, 7+) 01/01/1999, 05/13/2004     TDAP Vaccine (Boostrix) 09/06/2008, 12/21/2010, 06/24/2012        ROS:  CONSTITUTIONAL: NEGATIVE for fever, chills  RESP: NEGATIVE for significant cough or SOB  CV: NEGATIVE for chest pain, palpitations   GI: NEGATIVE for nausea, abdominal pain, heartburn, or change in bowel habits    OBJECTIVE:  /72 (BP Location: Left arm, Patient Position: Chair, Cuff Size: Adult Regular)   Pulse 68   Temp 97.3  F (36.3  C)   Resp 20   Ht 1.867 m (6' 1.5\")   Wt 100.3 kg (221 lb 3.2 oz)   BMI 28.79 kg/m    EXAM:  GENERAL APPEARANCE: healthy, alert and no distress  EYES: EOMI,  PERRL  HENT: ear canals and TM's normal and nose and mouth without ulcers or lesions  RESP: lungs clear to auscultation - no rales, rhonchi or wheezes  CV: regular rates and rhythm, normal S1 S2, no S3 or S4 and no murmur, click or rub -  ABDOMEN:  soft, nontender, no HSM or masses and bowel sounds normal  PSYCH: mentation appears normal and affect normal/bright    ASSESSMENT/PLAN:  (R06.9) Breathing problem  (primary encounter diagnosis)  Comment: pt xray does not show pneumothorax pending radiology read, suspect this is more psychogenic vs thoracic pain related  Plan: NC XRAY CHEST, 2 VIEWS        Could consider pulmonary eval in future but would pursue back issues first    (M54.6,  G89.29) Chronic left-sided thoracic back pain  Comment: discussed that we are not willing to handle pain meds- needs to come from spine doctor or pain clinic-pt initially upset but then did apologize  Plan: NC XRAY CHEST, 2 VIEWS             "

## 2021-06-03 VITALS — BODY MASS INDEX: 21.22 KG/M2 | WEIGHT: 140 LBS | HEIGHT: 68 IN

## 2021-06-10 ENCOUNTER — OFFICE VISIT (OUTPATIENT)
Dept: FAMILY MEDICINE | Facility: CLINIC | Age: 31
End: 2021-06-10

## 2021-06-10 VITALS
DIASTOLIC BLOOD PRESSURE: 76 MMHG | WEIGHT: 221 LBS | OXYGEN SATURATION: 98 % | TEMPERATURE: 98.6 F | SYSTOLIC BLOOD PRESSURE: 118 MMHG | BODY MASS INDEX: 28.36 KG/M2 | HEIGHT: 74 IN | HEART RATE: 73 BPM

## 2021-06-10 DIAGNOSIS — H92.02 LEFT EAR PAIN: ICD-10-CM

## 2021-06-10 DIAGNOSIS — J01.00 ACUTE NON-RECURRENT MAXILLARY SINUSITIS: ICD-10-CM

## 2021-06-10 DIAGNOSIS — H61.22 IMPACTED CERUMEN OF LEFT EAR: Primary | ICD-10-CM

## 2021-06-10 PROCEDURE — 69209 REMOVE IMPACTED EAR WAX UNI: CPT | Mod: LT | Performed by: PHYSICIAN ASSISTANT

## 2021-06-10 PROCEDURE — 99213 OFFICE O/P EST LOW 20 MIN: CPT | Mod: 25 | Performed by: PHYSICIAN ASSISTANT

## 2021-06-10 RX ORDER — FLUVOXAMINE MALEATE 100 MG/1
CAPSULE, EXTENDED RELEASE ORAL
COMMUNITY
Start: 2021-05-24

## 2021-06-10 RX ORDER — MIRTAZAPINE 15 MG/1
TABLET, FILM COATED ORAL
COMMUNITY
Start: 2021-06-04 | End: 2023-11-07

## 2021-06-10 ASSESSMENT — MIFFLIN-ST. JEOR: SCORE: 2014.26

## 2021-06-10 NOTE — PROGRESS NOTES
"CC: Ear ache    History:  Que is here today with symptoms that started 10 days ago with nasal congestion and fullness and whooshing in maxillary sinuses. Left ear started aching as well, which seems to be worsening. Denies any teeth pain. Is having mild cough, and PND. No SOB, fevers, sweats, chills. No significant history of ear infection, but is prone to sinus infections. Overall sinus symptoms are gradually improving, but ear is very bothersome. Has been taking ibuprofen to help with left ear.  Does get some seasonal allergies at times, but not to the point of taking medication.     PMH, MEDICATIONS, ALLERGIES, SOCIAL AND FAMILY HISTORY in [facility] and reviewed by me personally.    ROS negative other than the symptoms noted above in the HPI.      Examination   /76 (BP Location: Left arm, Patient Position: Sitting, Cuff Size: Adult Large)   Pulse 73   Temp 98.6  F (37  C) (Temporal)   Ht 1.867 m (6' 1.5\")   Wt 100.2 kg (221 lb)   SpO2 98%   BMI 28.76 kg/m       Constitutional: Sitting comfortably, in no acute distress. Vital signs noted  Ears: Following disimpaction, external canals and TMs free of abnormalities  Nose: patent, without mucosal abnormalities. No tenderness to palpation over sinuses.   Mouth and throat: without erythema or lesions of the mucosa  Neck:  no adenopathy, trachea midline and normal to palpation, thyroid normal to palpation  Cardiovascular:  regular rate and rhythm, no murmurs, clicks, or gallops  Respiratory:  normal respiratory rate and rhythm, lungs clear to auscultation  SKIN: No jaundice/pallor/rash.   Psychiatric: mentation appears normal and affect normal/bright        A/P    ICD-10-CM    1. Impacted cerumen of left ear  H61.22    2. Left ear pain  H92.02    3. Acute non-recurrent maxillary sinusitis  J01.00        DISCUSSION:  Cerumen successfully disimpacted during appt using irrigation. Patient tolerated well, and is feeling improvement in symptoms. Explained to pt that " it can take 2-3 days for sensation in ear and hearing to return to normal, and if still abnormal at that time should consider returning, as they may have to see an ENT. Gave recommendations on ear wax management. Also advised avoiding use of q-tips.     Reassured that ear symptoms significantly improved after disimpaction. I do feel that Que is also recovering from a sinus infection. Explained to him that these are often viral and can resolve without abx, but asked him to contact me if symptoms worsen and would start on antibiotic, likely Augmentin.     follow up visit: As needed    Hillary Curtis PA-C  [facility]

## 2021-06-10 NOTE — NURSING NOTE
Que is here for left ear pain, sinus congestion, possible sinus infection for 10 days.        Pre-visit Screening:  Immunizations:  not up to date - will get covid vac at a later time  Colonoscopy:  NA  Mammogram: NA  Asthma Action Test/Plan:  NA  PHQ9:  NA to today's visit  GAD7:  NA to today's visit  Questioned patient about current smoking habits Pt. Occasional vaping  Ok to leave detailed message on voice mail for today's visit only Yes, phone # cell

## 2021-07-17 ENCOUNTER — HOSPITAL ENCOUNTER (EMERGENCY)
Facility: CLINIC | Age: 31
Discharge: HOME | End: 2021-07-18
Attending: EMERGENCY MEDICINE | Admitting: EMERGENCY MEDICINE
Payer: COMMERCIAL

## 2021-07-17 DIAGNOSIS — N50.1: ICD-10-CM

## 2021-07-17 LAB
ALBUMIN UR-MCNC: NEGATIVE MG/DL
APPEARANCE UR: CLEAR
BILIRUB UR QL STRIP: NEGATIVE
COLOR UR AUTO: NORMAL
GLUCOSE UR STRIP-MCNC: NEGATIVE MG/DL
HGB UR QL STRIP: NEGATIVE
KETONES UR STRIP-MCNC: NEGATIVE MG/DL
LEUKOCYTE ESTERASE UR QL STRIP: NEGATIVE
NITRATE UR QL: NEGATIVE
PH UR STRIP: 5 [PH] (ref 5–7)
RBC URINE: 0 /HPF
SP GR UR STRIP: 1.02 (ref 1–1.03)
UROBILINOGEN UR STRIP-MCNC: NORMAL MG/DL
WBC URINE: <1 /HPF

## 2021-07-17 PROCEDURE — 81001 URINALYSIS AUTO W/SCOPE: CPT | Performed by: EMERGENCY MEDICINE

## 2021-07-17 PROCEDURE — 99284 EMERGENCY DEPT VISIT MOD MDM: CPT | Mod: 25

## 2021-07-18 ENCOUNTER — APPOINTMENT (OUTPATIENT)
Dept: ULTRASOUND IMAGING | Facility: CLINIC | Age: 31
End: 2021-07-18
Attending: EMERGENCY MEDICINE
Payer: COMMERCIAL

## 2021-07-18 VITALS
OXYGEN SATURATION: 94 % | DIASTOLIC BLOOD PRESSURE: 83 MMHG | TEMPERATURE: 98.2 F | RESPIRATION RATE: 20 BRPM | SYSTOLIC BLOOD PRESSURE: 136 MMHG | HEART RATE: 106 BPM

## 2021-07-18 PROCEDURE — 76870 US EXAM SCROTUM: CPT
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
## 2021-07-18 NOTE — DISCHARGE INSTRUCTIONS
The ultrasound did not show any significant injury to the testicles.  I am uncertain what caused the bruising.  However there is nothing significant to be concerned about today.  Follow-up with your regular doctor in the next week.  If there is severe pain, penile bleeding, blood in the urine, or any other concerns, please return to the ER right away.
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
## 2021-07-19 ENCOUNTER — CARE COORDINATION (OUTPATIENT)
Dept: FAMILY MEDICINE | Facility: CLINIC | Age: 31
End: 2021-07-19
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
## 2021-10-23 ENCOUNTER — HEALTH MAINTENANCE LETTER (OUTPATIENT)
Age: 31
End: 2021-10-23

## 2021-11-24 ENCOUNTER — OFFICE VISIT (OUTPATIENT)
Dept: FAMILY MEDICINE | Facility: CLINIC | Age: 31
End: 2021-11-24

## 2021-11-24 VITALS
OXYGEN SATURATION: 94 % | WEIGHT: 221 LBS | HEART RATE: 83 BPM | DIASTOLIC BLOOD PRESSURE: 78 MMHG | BODY MASS INDEX: 28.36 KG/M2 | SYSTOLIC BLOOD PRESSURE: 126 MMHG | TEMPERATURE: 97.6 F | HEIGHT: 74 IN

## 2021-11-24 DIAGNOSIS — R05.9 COUGH: Primary | ICD-10-CM

## 2021-11-24 LAB
% GRANULOCYTES: 55.6 %
COVID-19: NEGATIVE
HCT VFR BLD AUTO: 48 % (ref 40–53)
HEMOGLOBIN: 16.1 G/DL (ref 13.3–17.7)
LYMPHOCYTES NFR BLD AUTO: 29.7 %
MCH RBC QN AUTO: 31.7 PG (ref 26–33)
MCHC RBC AUTO-ENTMCNC: 33.5 G/DL (ref 31–36)
MCV RBC AUTO: 94.4 FL (ref 78–100)
MONOCYTES NFR BLD AUTO: 14.7 %
PLATELET COUNT - QUEST: 225 10^9/L (ref 150–375)
RBC # BLD AUTO: 5.08 10*12/L (ref 4.4–5.9)
WBC # BLD AUTO: 5.6 10*9/L (ref 4–11)

## 2021-11-24 PROCEDURE — 71046 X-RAY EXAM CHEST 2 VIEWS: CPT | Performed by: FAMILY MEDICINE

## 2021-11-24 PROCEDURE — 99214 OFFICE O/P EST MOD 30 MIN: CPT | Performed by: PHYSICIAN ASSISTANT

## 2021-11-24 PROCEDURE — 85025 COMPLETE CBC W/AUTO DIFF WBC: CPT | Performed by: PHYSICIAN ASSISTANT

## 2021-11-24 PROCEDURE — G2023 SPECIMEN COLLECT COVID-19: HCPCS | Performed by: PHYSICIAN ASSISTANT

## 2021-11-24 PROCEDURE — 36415 COLL VENOUS BLD VENIPUNCTURE: CPT | Performed by: PHYSICIAN ASSISTANT

## 2021-11-24 PROCEDURE — 87635 SARS-COV-2 COVID-19 AMP PRB: CPT | Performed by: PHYSICIAN ASSISTANT

## 2021-11-24 RX ORDER — ALBUTEROL SULFATE 90 UG/1
2 AEROSOL, METERED RESPIRATORY (INHALATION) EVERY 6 HOURS
Qty: 18 G | Refills: 0 | Status: SHIPPED | OUTPATIENT
Start: 2021-11-24 | End: 2023-01-30

## 2021-11-24 ASSESSMENT — MIFFLIN-ST. JEOR: SCORE: 2014.26

## 2021-11-24 NOTE — PROGRESS NOTES
"Assessment & Plan   Problem List Items Addressed This Visit     None      Visit Diagnoses     Cough    -  Primary    Relevant Medications    albuterol (PROAIR HFA/PROVENTIL HFA/VENTOLIN HFA) 108 (90 Base) MCG/ACT inhaler    Other Relevant Orders    COVID-19 ([facility]) (Completed)    HEMOGRAM PLATELET DIFF ([facility]) (Completed)    VENOUS COLLECTION (Completed)    XR Chest 2 Views (Completed)         1. Fever    - COVID-19 ([facility])  - HEMOGRAM PLATELET DIFF ([facility])  - VENOUS COLLECTION    2. Cough  He has wheezing bilateral lung fields. He may have tested for covid too early. I recommend to repeat a covid test today. He became very angry and refused, said he didn't want a nasal swab. I explained that we should do a chest xray today, I can't just give him a medication. He said this is like \"Namillie Griffin\", refused a covid test. Said he didn't think he should be charged for today's visit. Loudly pushed the door open as he left the clinic, swearing, F** twice.  I told him another option would be to go to the urgent care.   He chose to leave.  - COVID-19 ([facility])  - HEMOGRAM PLATELET DIFF ([facility])  - VENOUS COLLECTION           Tobacco Cessation:   reports that he has quit smoking. His smoking use included other. He has quit using smokeless tobacco.  His smokeless tobacco use included chew.      BMI:   Estimated body mass index is 28.76 kg/m  as calculated from the following:    Height as of this encounter: 1.867 m (6' 1.5\").    Weight as of this encounter: 100.2 kg (221 lb).         FUTURE APPOINTMENTS:       - Follow-up visit advised in urgent care today.    No follow-ups on file.       Alda Melara MD  [facility]      ADDENDUM:  Patient returned to the clinic after explanation that we can't send pts up to [facility] xray w/o negative COVID test today.  I saw the patient. Pt agreed to test which was negative.  Xray read as clear per radiology.  CBC normal.  Wheezing in the right middle lung -Albuterol prescribed  1-2 puffs " "every 4-6 hours. Delsym and Sudafed.   Of note he was with his grandmother who he found out had COVID about 14 days ago. Advised that he may have COVID that is not detectable yet. Advised to be seen in UC/ER in the next 48 hours if not improving.  He was very apologetic for his behavior.     Osiris Faust PA-C  11/24/2021      Subjective     Nursing Notes:   Haydee Avery CMA  11/24/2021 11:08 AM  Addendum  Chief Complaint   Patient presents with     URI     deep chest cough started last Sunday, SOB and wheezing, facial pressure, congestion, green phelgm when coughing and sneezing, NEG COVID test on 11/22       Immunizations:  up to date  Colonoscopy:  NA  Mammogram: NA  Asthma Action Test/Plan:  NA  PHQ9:  NA  GAD7:  NA  Questioned patient about current smoking habits Pt. recently quit smoking.  Ok to leave detailed message on voice mail for today's visit only Yes, phone # [phone]           Hong Santiago is a 30 year old male who presents to clinic today for the following health issues   HPI       Has not had covid vaccine.  Cough and congestion started Sunday afternoon.  Has nasal congestion, cough that is deep.   Neg covid test 2 days ago. Has had the same stuff before. This usually clears up with a zpack.  Has some shortness of breath. No fevers.  Temp to 99.8 yesterday.       Review of Systems   Constitutional, HEENT, cardiovascular, pulmonary, gi and gu systems are negative, except as otherwise noted.      Objective    /78 (BP Location: Right arm, Patient Position: Sitting, Cuff Size: Adult Large)   Pulse 83   Temp 97.6  F (36.4  C) (Temporal)   Ht 1.867 m (6' 1.5\")   Wt 100.2 kg (221 lb)   SpO2 94%   BMI 28.76 kg/m    Body mass index is 28.76 kg/m .  Physical Exam  Oxygen sat by me, 95%  GENERAL: healthy, alert and no distress  RESP: lungs bilateral slight wheezing  CV: regular rate and rhythm, normal S1 S2, no S3 or S4, no murmur, click or rub, no peripheral edema and peripheral " pulses strong  MS: no gross musculoskeletal defects noted, no edema  NEURO: Normal strength and tone, mentation intact and speech normal  PSYCH: mentation appears normal, affect normal/bright  He became angry at the apt with my recommendations, left swearing and didn't complete the apt.

## 2021-11-24 NOTE — NURSING NOTE
Chief Complaint   Patient presents with     URI     deep chest cough started last Sunday, SOB and wheezing, facial pressure, congestion, green phelgm when coughing and sneezing, NEG COVID test on 11/22       Immunizations:  up to date  Colonoscopy:  NA  Mammogram: NA  Asthma Action Test/Plan:  NA  PHQ9:  NA  GAD7:  NA  Questioned patient about current smoking habits Pt. recently quit smoking.  Ok to leave detailed message on voice mail for today's visit only Yes, phone # [phone]

## 2022-02-12 ENCOUNTER — HEALTH MAINTENANCE LETTER (OUTPATIENT)
Age: 32
End: 2022-02-12

## 2022-07-20 ENCOUNTER — OFFICE VISIT (OUTPATIENT)
Dept: FAMILY MEDICINE | Facility: CLINIC | Age: 32
End: 2022-07-20

## 2022-07-20 VITALS
BODY MASS INDEX: 29.41 KG/M2 | DIASTOLIC BLOOD PRESSURE: 62 MMHG | SYSTOLIC BLOOD PRESSURE: 112 MMHG | WEIGHT: 229.2 LBS | HEART RATE: 72 BPM | RESPIRATION RATE: 20 BRPM | HEIGHT: 74 IN | TEMPERATURE: 97.1 F

## 2022-07-20 DIAGNOSIS — N50.812 LEFT TESTICULAR PAIN: Primary | ICD-10-CM

## 2022-07-20 PROCEDURE — 99213 OFFICE O/P EST LOW 20 MIN: CPT | Performed by: FAMILY MEDICINE

## 2022-07-20 NOTE — PROGRESS NOTES
SUBJECTIVE: Hong Santiago is a 31 year old male who presents for left testicular pain on and off for a month, worse with pressure.  No swelling. NO dysuria. No discharge.     Pt states he had some pain in this region in highschool and US was OK- told he may have had temporary torsion.      Pt very worried    Patient Active Problem List   Diagnosis     EDWARD (generalized anxiety disorder)     Health Care Home     ACP (advance care planning)     Depressive disorder     Other malaise     S/P fusion of thoracic spine     Past Medical History:   Diagnosis Date     Anxiety      Family History   Problem Relation Age of Onset     Depression Mother      No Known Problems Father      No Known Problems Sister      Bipolar Disorder Brother      Alcoholism Brother      Social History     Socioeconomic History     Marital status: Single     Spouse name: Not on file     Number of children: Not on file     Years of education: Not on file     Highest education level: Not on file   Occupational History     Employer: [employer]     Comment: [employer]     Employer: OTHER     Comment: [employer]   Tobacco Use     Smoking status: Former Smoker     Types: Other     Smokeless tobacco: Former User     Types: Chew   Substance and Sexual Activity     Alcohol use: Yes     Comment: 2 drinks per week     Drug use: No     Sexual activity: Yes     Partners: Female     Birth control/protection: Condom   Other Topics Concern     Not on file   Social History Narrative     Not on file     Social Determinants of Health     Financial Resource Strain: Low Risk      Difficulty of Paying Living Expenses: Not hard at all   Food Insecurity: No Food Insecurity     Worried About Running Out of Food in the Last Year: Never true     Ran Out of Food in the Last Year: Never true   Transportation Needs: No Transportation Needs     Lack of Transportation (Medical): No     Lack of Transportation (Non-Medical): No   Physical Activity: Not on  "file   Stress: Not on file   Social Connections: Not on file   Intimate Partner Violence: Not on file   Housing Stability: Not on file     Past Surgical History:   Procedure Laterality Date     FUSION SPINE POSTERIOR MINIMALLY INVASIVE TWO LEVELS N/A 9/19/2019    Procedure: Thoracic 6-8 interbody and posterolateral fusion, minimally invasive epidural steroid injection;  Surgeon: Bobo Lopez MD;  Location: [facility] OR     ORTHOPEDIC SURGERY Right 2010    bunion     clonazePAM (KLONOPIN) 0.5 MG tablet, Take 1 tablet by mouth twice a day  fluvoxaMINE Maleate (LUVOX CR) 100 MG 24 hr capsule, TAKE 4 CAPSULES BY MOUTH ONCE DAILY  mirtazapine (REMERON) 15 MG tablet,   albuterol (PROAIR HFA/PROVENTIL HFA/VENTOLIN HFA) 108 (90 Base) MCG/ACT inhaler, Inhale 2 puffs into the lungs every 6 hours  HEMP OIL OR EXTRACT OR OTHER CBD CANNABINOID, NOT MEDICAL CANNABIS,,     No current facility-administered medications on file prior to visit.       Allergies: Patient has no known allergies.    Immunization History   Administered Date(s) Administered     DT (PEDS <7y) 11/17/1991, 02/23/1994     Hep B, Peds or Adolescent 11/01/2000, 02/28/2001, 08/08/2001     Historic Hib Hib-titer 01/17/1991     Historical DTP/aP 02/23/1989, 05/04/1989, 07/24/1989     MMR 04/16/1990, 11/01/2000     OPV, unspecified 02/23/1989, 05/04/1989, 01/17/1991, 02/23/1994     TD (ADULT, 7+) 01/01/1999, 05/13/2004     TDAP Vaccine (Boostrix) 09/06/2008, 12/21/2010, 06/24/2012      OBJECTIVE: /62 (BP Location: Right arm, Patient Position: Chair, Cuff Size: Adult Large)   Pulse 72   Temp 97.1  F (36.2  C) (Temporal)   Resp 20   Ht 1.867 m (6' 1.5\")   Wt 104 kg (229 lb 3.2 oz)   BMI 29.83 kg/m        male: normal penis, circumcised, no tenderness on testicles, does have likely epididymal head cyst on left    ASSESSMENT: /PLAN:   (N50.812) Left testicular pain  (primary encounter diagnosis)  Comment: pt with pain, no signs epididymitis, does have cyst, pt " quite concerned-we agree US reasonable given hx and current symptoms   Plan: US Testicular & Scrotum w Doppler Ltd,         Radiology Referral        In next 1-2 days

## 2022-07-20 NOTE — NURSING NOTE
Hong Santiago is here for testicular pain for the past month.     Questioned patient about current smoking habits.  Pt. quit smoking some time ago.  PULSE regular  My Chart: active  CLASSIFICATION OF OVERWEIGHT AND OBESITY BY BMI                        Obesity Class           BMI(kg/m2)  Underweight                                    < 18.5  Normal                                         18.5-24.9  Overweight                                     25.0-29.9  OBESITY                     I                  30.0-34.9                             II                 35.0-39.9  EXTREME OBESITY             III                >40                            Patient's  BMI Body mass index is 29.83 kg/m .  http://hin.nhlbi.nih.gov/menuplanner/menu.cgi  Pre-visit planning  Immunizations - up to date  Colonoscopy -   Mammogram -   Asthma -   PHQ9 -    EDWARD-7 -

## 2022-10-10 ENCOUNTER — HEALTH MAINTENANCE LETTER (OUTPATIENT)
Age: 32
End: 2022-10-10

## 2023-01-30 ENCOUNTER — OFFICE VISIT (OUTPATIENT)
Dept: FAMILY MEDICINE | Facility: CLINIC | Age: 33
End: 2023-01-30

## 2023-01-30 VITALS
SYSTOLIC BLOOD PRESSURE: 118 MMHG | DIASTOLIC BLOOD PRESSURE: 70 MMHG | HEIGHT: 74 IN | HEART RATE: 69 BPM | OXYGEN SATURATION: 97 % | TEMPERATURE: 97.9 F | BODY MASS INDEX: 29 KG/M2 | WEIGHT: 226 LBS | RESPIRATION RATE: 20 BRPM

## 2023-01-30 DIAGNOSIS — Z13.220 LIPID SCREENING: ICD-10-CM

## 2023-01-30 DIAGNOSIS — R19.5 LOOSE STOOLS: ICD-10-CM

## 2023-01-30 DIAGNOSIS — Z13.1 DIABETES MELLITUS SCREENING: ICD-10-CM

## 2023-01-30 DIAGNOSIS — Z00.00 ROUTINE PHYSICAL EXAMINATION: Primary | ICD-10-CM

## 2023-01-30 LAB
% GRANULOCYTES: 60.6 %
ALBUMIN SERPL-MCNC: 4.5 G/DL (ref 3.6–5.1)
ALBUMIN/GLOB SERPL: 1.7 {RATIO} (ref 1–2.5)
ALP SERPL-CCNC: 38 U/L (ref 33–130)
ALT 1742-6: 14 U/L (ref 0–32)
AST 1920-8: 16 U/L (ref 0–35)
BILIRUB SERPL-MCNC: 0.8 MG/DL (ref 0.2–1.2)
BUN SERPL-MCNC: 15 MG/DL (ref 7–25)
BUN/CREATININE RATIO: 14.2 (ref 6–22)
CALCIUM SERPL-MCNC: 9.3 MG/DL (ref 8.6–10.3)
CHLORIDE SERPLBLD-SCNC: 101.7 MMOL/L (ref 98–110)
CHOLEST SERPL-MCNC: 202 MG/DL (ref 0–199)
CHOLEST/HDLC SERPL: 4 {RATIO} (ref 0–5)
CO2 SERPL-SCNC: 31.4 MMOL/L (ref 20–32)
CREAT SERPL-MCNC: 1.06 MG/DL (ref 0.6–1.3)
GLOBULIN, CALCULATED - QUEST: 2.6 (ref 1.9–3.7)
GLUCOSE SERPL-MCNC: 101 MG/DL (ref 60–99)
HCT VFR BLD AUTO: 45.8 % (ref 40–53)
HDLC SERPL-MCNC: 47 MG/DL (ref 40–150)
HEMOGLOBIN: 15.3 G/DL (ref 13.3–17.7)
LDLC SERPL CALC-MCNC: 134 MG/DL (ref 0–130)
LYMPHOCYTES NFR BLD AUTO: 30.5 %
MCH RBC QN AUTO: 31.2 PG (ref 26–33)
MCHC RBC AUTO-ENTMCNC: 33.4 G/DL (ref 31–36)
MCV RBC AUTO: 93.3 FL (ref 78–100)
MONOCYTES NFR BLD AUTO: 8.9 %
PLATELET COUNT - QUEST: 180 10^9/L (ref 150–375)
POTASSIUM SERPL-SCNC: 4.28 MMOL/L (ref 3.5–5.3)
PROT SERPL-MCNC: 7.1 G/DL (ref 6.1–8.1)
RBC # BLD AUTO: 4.91 10*12/L (ref 4.4–5.9)
SODIUM SERPL-SCNC: 138.4 MMOL/L (ref 135–146)
TRIGL SERPL-MCNC: 106 MG/DL (ref 0–149)
WBC # BLD AUTO: 6.6 10*9/L (ref 4–11)

## 2023-01-30 PROCEDURE — 99395 PREV VISIT EST AGE 18-39: CPT | Mod: 25 | Performed by: STUDENT IN AN ORGANIZED HEALTH CARE EDUCATION/TRAINING PROGRAM

## 2023-01-30 PROCEDURE — 36415 COLL VENOUS BLD VENIPUNCTURE: CPT | Performed by: STUDENT IN AN ORGANIZED HEALTH CARE EDUCATION/TRAINING PROGRAM

## 2023-01-30 PROCEDURE — 80053 COMPREHEN METABOLIC PANEL: CPT | Performed by: STUDENT IN AN ORGANIZED HEALTH CARE EDUCATION/TRAINING PROGRAM

## 2023-01-30 PROCEDURE — 90471 IMMUNIZATION ADMIN: CPT | Performed by: STUDENT IN AN ORGANIZED HEALTH CARE EDUCATION/TRAINING PROGRAM

## 2023-01-30 PROCEDURE — 80061 LIPID PANEL: CPT | Performed by: STUDENT IN AN ORGANIZED HEALTH CARE EDUCATION/TRAINING PROGRAM

## 2023-01-30 PROCEDURE — 85025 COMPLETE CBC W/AUTO DIFF WBC: CPT | Performed by: STUDENT IN AN ORGANIZED HEALTH CARE EDUCATION/TRAINING PROGRAM

## 2023-01-30 PROCEDURE — 90715 TDAP VACCINE 7 YRS/> IM: CPT | Performed by: STUDENT IN AN ORGANIZED HEALTH CARE EDUCATION/TRAINING PROGRAM

## 2023-01-30 NOTE — NURSING NOTE
Chief Complaint   Patient presents with     Physical     Annual, fasting, due for tetanus shot      Consult For     Feels on and off side pains for the past couple of weeks, did have back surgery awhile ago and does sometimes get back pain still from this and wondering if the side pain is radiating towards his side or if this is being caused by something else      Pre-visit Screening:  Immunizations:  not up to date - due for tetanus   Colonoscopy:  NA  Mammogram: NA  Asthma Action Test/Plan:  NA  PHQ9:  PHQ2 done today   GAD7:  No concerns  Questioned patient about current smoking habits Pt. quit smoking some time ago.  Ok to leave detailed message on voice mail for today's visit only Yes, phone # [phone]

## 2023-01-30 NOTE — PROGRESS NOTES
3  SUBJECTIVE:   CC: Hong Santiago is an 32 year old male who presents for preventive health visit.     Patient has been advised of split billing requirements and indicates understanding: Yes    Healthy Habits:    General health: pretty good, some chronic back pain still going to PT    Diet: pretty good    Exercise: gym 3-4d/wk    Sleep: ok    Mental Health: stable, sees psychiatrist regularly     Works at [employer], maintenance  Living with parents since spine surgery in 2019      Problems taking medications regularly No    Medication side effects: No    Have you had an eye exam in the past two years? yes    Do you see a dentist twice per year? yes    Do you have sleep apnea, excessive snoring or daytime drowsiness?no    Today's PHQ-2 Score:   PHQ-2 ( 1999 Pf[employer]) 1/30/2023 5/24/2017   Q1: Little interest or pleasure in doing things 0 0   Q2: Feeling down, depressed or hopeless 0 0   PHQ-2 Score 0 0     Do you feel safe in your environment? Yes      Social History     Tobacco Use     Smoking status: Former     Types: Other     Smokeless tobacco: Former     Types: Chew   Substance Use Topics     Alcohol use: Yes     Comment: occasionally     If you drink alcohol do you typically have >3 drinks per day or >7 drinks per week? No                      Last PSA: No results found for: PSA    Reviewed orders with patient. Reviewed health maintenance and updated orders accordingly - Yes  Lab work is in process  Labs reviewed in EPIC  BP Readings from Last 3 Encounters:   01/30/23 118/70   07/20/22 112/62   11/24/21 126/78    Wt Readings from Last 3 Encounters:   01/30/23 102.5 kg (226 lb)   07/20/22 104 kg (229 lb 3.2 oz)   11/24/21 100.2 kg (221 lb)                    Reviewed and updated as needed this visit by clinical staff   Tobacco  Allergies  Meds              Reviewed and updated as needed this visit by Provider                 Past Medical History:   Diagnosis Date     Anxiety       Past Surgical History:  "  Procedure Laterality Date     FUSION SPINE POSTERIOR MINIMALLY INVASIVE TWO LEVELS N/A 9/19/2019    Procedure: Thoracic 6-8 interbody and posterolateral fusion, minimally invasive epidural steroid injection;  Surgeon: Bobo Lopez MD;  Location: [facility] OR     ORTHOPEDIC SURGERY Right 2010    bunion     Family History   Problem Relation Age of Onset     Depression Mother      No Known Problems Father      No Known Problems Sister      Bipolar Disorder Brother      Alcoholism Brother      Prostate Cancer Maternal Grandfather      Diabetes Other         mat uncle     Cerebrovascular Disease No family hx of      Coronary Artery Disease No family hx of      Colon Cancer No family hx of      Hyperlipidemia No family hx of      Hypertension No family hx of        ROS:  12 point ROS performed and negative for new concerns except as mentioned above     OBJECTIVE:   /70 (BP Location: Right arm, Patient Position: Sitting, Cuff Size: Adult Large)   Pulse 69   Temp 97.9  F (36.6  C) (Temporal)   Resp 20   Ht 1.867 m (6' 1.5\")   Wt 102.5 kg (226 lb)   SpO2 97%   BMI 29.41 kg/m    EXAM:  GENERAL: healthy, alert and no distress  EYES: Eyes grossly normal to inspection, PERRL and conjunctivae and sclerae normal  HENT: ear canals and TM's normal, nose and mouth without ulcers or lesions  NECK: no adenopathy, no asymmetry, masses, or scars and thyroid normal to palpation  RESP: lungs clear to auscultation - no rales, rhonchi or wheezes  CV: regular rate and rhythm, normal S1 S2, no S3 or S4, no murmur, click or rub, no peripheral edema and peripheral pulses strong  ABDOMEN: soft, nontender, no hepatosplenomegaly, no masses and bowel sounds normal   (male): normal male genitalia without lesions or urethral discharge, no hernia  MS: no gross musculoskeletal defects noted, no edema  SKIN: no suspicious lesions or rashes  NEURO: Normal strength and tone, mentation intact and speech normal  PSYCH: mentation appears normal, " "affect normal/bright    Diagnostic Test Results:  Labs reviewed in Epic    ASSESSMENT/PLAN:       ICD-10-CM    1. Routine physical examination  Z00.00       2. Lipid screening  Z13.220 Lipid Panel (BFP)      3. Diabetes mellitus screening  Z13.1 Comprehensive Metobolic Panel (BFP)      4. BMI 29.0-29.9,adult  Z68.29 Comprehensive Metobolic Panel (BFP)      5. Loose stools  R19.5 HEMOGRAM PLATELET DIFF (BFP)     Comprehensive Metobolic Panel (BFP)           Patient has been advised of split billing requirements and indicates understanding: Yes  COUNSELING:  Reviewed preventive health counseling, as reflected in patient instructions       Regular exercise       Healthy diet/nutrition       Vision screening       Hearing screening       Immunizations       Alcohol Use        Safe sex practices/STD prevention       Colorectal cancer screening       Prostate cancer screening    Estimated body mass index is 29.41 kg/m  as calculated from the following:    Height as of this encounter: 1.867 m (6' 1.5\").    Weight as of this encounter: 102.5 kg (226 lb).    Weight management plan: Discussed healthy diet and exercise guidelines    He reports that he has quit smoking. His smoking use included other. He has quit using smokeless tobacco.  His smokeless tobacco use included chew.    Patient Instructions   Blood work today    Let me know if you want to see a pain/spine specialist    Follow-up yearly, sooner if concerns       Raghavendra Castano MD, [facility]    "

## 2023-01-30 NOTE — PATIENT INSTRUCTIONS
Blood work today    Let me know if you want to see a pain/spine specialist    Follow-up yearly, sooner if concerns

## 2023-02-14 ENCOUNTER — APPOINTMENT (OUTPATIENT)
Dept: GENERAL RADIOLOGY | Facility: CLINIC | Age: 33
End: 2023-02-14
Attending: EMERGENCY MEDICINE
Payer: COMMERCIAL

## 2023-02-14 ENCOUNTER — HOSPITAL ENCOUNTER (EMERGENCY)
Facility: CLINIC | Age: 33
Discharge: HOME | End: 2023-02-14
Attending: EMERGENCY MEDICINE | Admitting: EMERGENCY MEDICINE
Payer: COMMERCIAL

## 2023-02-14 VITALS
DIASTOLIC BLOOD PRESSURE: 62 MMHG | OXYGEN SATURATION: 99 % | HEIGHT: 73 IN | WEIGHT: 225 LBS | SYSTOLIC BLOOD PRESSURE: 109 MMHG | TEMPERATURE: 96.8 F | RESPIRATION RATE: 17 BRPM | HEART RATE: 56 BPM | BODY MASS INDEX: 29.82 KG/M2

## 2023-02-14 DIAGNOSIS — R10.9 FLANK PAIN: ICD-10-CM

## 2023-02-14 DIAGNOSIS — R07.89 INTERMITTENT LEFT-SIDED CHEST PAIN: ICD-10-CM

## 2023-02-14 DIAGNOSIS — R10.9 INTERMITTENT ABDOMINAL PAIN: ICD-10-CM

## 2023-02-14 LAB
ALBUMIN SERPL BCG-MCNC: 4.9 G/DL (ref 3.5–5.2)
ALBUMIN UR-MCNC: NEGATIVE MG/DL
ALP SERPL-CCNC: 45 U/L (ref 40–129)
ALT SERPL W P-5'-P-CCNC: 29 U/L (ref 10–50)
ANION GAP SERPL CALCULATED.3IONS-SCNC: 10 MMOL/L (ref 7–15)
APPEARANCE UR: CLEAR
AST SERPL W P-5'-P-CCNC: 33 U/L (ref 10–50)
BASOPHILS # BLD AUTO: 0.1 10E3/UL (ref 0–0.2)
BASOPHILS NFR BLD AUTO: 1 %
BILIRUB SERPL-MCNC: 0.4 MG/DL
BILIRUB UR QL STRIP: NEGATIVE
BUN SERPL-MCNC: 12.8 MG/DL (ref 6–20)
CALCIUM SERPL-MCNC: 9.7 MG/DL (ref 8.6–10)
CHLORIDE SERPL-SCNC: 102 MMOL/L (ref 98–107)
COLOR UR AUTO: NORMAL
CREAT SERPL-MCNC: 1.04 MG/DL (ref 0.67–1.17)
DEPRECATED HCO3 PLAS-SCNC: 29 MMOL/L (ref 22–29)
EOSINOPHIL # BLD AUTO: 0.1 10E3/UL (ref 0–0.7)
EOSINOPHIL NFR BLD AUTO: 2 %
ERYTHROCYTE [DISTWIDTH] IN BLOOD BY AUTOMATED COUNT: 12.9 % (ref 10–15)
GFR SERPL CREATININE-BSD FRML MDRD: >90 ML/MIN/1.73M2
GLUCOSE SERPL-MCNC: 97 MG/DL (ref 70–99)
GLUCOSE UR STRIP-MCNC: NEGATIVE MG/DL
HCT VFR BLD AUTO: 45.4 % (ref 40–53)
HEMOCCULT STL QL: NEGATIVE
HGB BLD-MCNC: 15.4 G/DL (ref 13.3–17.7)
HGB UR QL STRIP: NEGATIVE
HOLD SPECIMEN: NORMAL
HOLD SPECIMEN: NORMAL
IMM GRANULOCYTES # BLD: 0 10E3/UL
IMM GRANULOCYTES NFR BLD: 0 %
KETONES UR STRIP-MCNC: NEGATIVE MG/DL
LEUKOCYTE ESTERASE UR QL STRIP: NEGATIVE
LIPASE SERPL-CCNC: 26 U/L (ref 13–60)
LYMPHOCYTES # BLD AUTO: 1.5 10E3/UL (ref 0.8–5.3)
LYMPHOCYTES NFR BLD AUTO: 28 %
MCH RBC QN AUTO: 30.7 PG (ref 26.5–33)
MCHC RBC AUTO-ENTMCNC: 33.9 G/DL (ref 31.5–36.5)
MCV RBC AUTO: 90 FL (ref 78–100)
MONOCYTES # BLD AUTO: 0.4 10E3/UL (ref 0–1.3)
MONOCYTES NFR BLD AUTO: 7 %
NEUTROPHILS # BLD AUTO: 3.3 10E3/UL (ref 1.6–8.3)
NEUTROPHILS NFR BLD AUTO: 62 %
NITRATE UR QL: NEGATIVE
NRBC # BLD AUTO: 0 10E3/UL
NRBC BLD AUTO-RTO: 0 /100
PH UR STRIP: 7 [PH] (ref 5–7)
PLATELET # BLD AUTO: 218 10E3/UL (ref 150–450)
POTASSIUM SERPL-SCNC: 4.2 MMOL/L (ref 3.4–5.3)
PROT SERPL-MCNC: 7.8 G/DL (ref 6.4–8.3)
RBC # BLD AUTO: 5.02 10E6/UL (ref 4.4–5.9)
SODIUM SERPL-SCNC: 141 MMOL/L (ref 136–145)
SP GR UR STRIP: 1.01 (ref 1–1.03)
TROPONIN T SERPL HS-MCNC: 7 NG/L
UROBILINOGEN UR STRIP-MCNC: NORMAL MG/DL
WBC # BLD AUTO: 5.3 10E3/UL (ref 4–11)

## 2023-02-14 PROCEDURE — 83690 ASSAY OF LIPASE: CPT | Performed by: EMERGENCY MEDICINE

## 2023-02-14 PROCEDURE — 80053 COMPREHEN METABOLIC PANEL: CPT | Performed by: EMERGENCY MEDICINE

## 2023-02-14 PROCEDURE — 36415 COLL VENOUS BLD VENIPUNCTURE: CPT | Performed by: EMERGENCY MEDICINE

## 2023-02-14 PROCEDURE — 85025 COMPLETE CBC W/AUTO DIFF WBC: CPT | Performed by: EMERGENCY MEDICINE

## 2023-02-14 PROCEDURE — 82272 OCCULT BLD FECES 1-3 TESTS: CPT | Performed by: EMERGENCY MEDICINE

## 2023-02-14 PROCEDURE — 84484 ASSAY OF TROPONIN QUANT: CPT | Performed by: EMERGENCY MEDICINE

## 2023-02-14 PROCEDURE — 81003 URINALYSIS AUTO W/O SCOPE: CPT | Performed by: EMERGENCY MEDICINE

## 2023-02-14 PROCEDURE — 71046 X-RAY EXAM CHEST 2 VIEWS: CPT

## 2023-02-14 PROCEDURE — 99284 EMERGENCY DEPT VISIT MOD MDM: CPT | Mod: 25

## 2023-02-14 ASSESSMENT — ACTIVITIES OF DAILY LIVING (ADL)
ADLS_ACUITY_SCORE: 35
ADLS_ACUITY_SCORE: 35

## 2023-02-14 NOTE — ED PROVIDER NOTES
History     Chief Complaint:  Abdominal Pain       HPI   32-year-old male presenting to the ER for evaluation of 2.5 months of intermittent abdominal bilateral flank pain.  This is also associated with occasional and intermittent sharp left-sided chest pain.  Reports remote history of prior thoracic spine surgery.  Denies any fever, extremity numbness/tingling or weakness.  Denies any aggravation/relieving factors of the abdominal pain or chest pain.  Denies shortness of breath, leg swelling, prior history of VTE.  Reports nausea without vomiting and has had intermittent diarrhea with occasionally tarry stool.  Denies any urinary symptoms.     Reports none of the symptoms have worsened recently but he has been putting his off and would like to be evaluated for it.     Independent Historian:   None - Patient Only    Review of External Notes: N/A     ROS:  Review of Systems   See HPI.    Allergies:  No Known Allergies     Medications:    clonazePAM (KLONOPIN) 0.5 MG tablet  fluvoxaMINE Maleate (LUVOX CR) 100 MG 24 hr capsule  HEMP OIL OR EXTRACT OR OTHER CBD CANNABINOID, NOT MEDICAL CANNABIS,  mirtazapine (REMERON) 15 MG tablet        Past Medical History:    Past Medical History:   Diagnosis Date     Anxiety        Past Surgical History:    Past Surgical History:   Procedure Laterality Date     FUSION SPINE POSTERIOR MINIMALLY INVASIVE TWO LEVELS N/A 9/19/2019    Procedure: Thoracic 6-8 interbody and posterolateral fusion, minimally invasive epidural steroid injection;  Surgeon: Bobo Lopez MD;  Location:  OR     ORTHOPEDIC SURGERY Right 2010 bunion        Family History:    family history includes Alcoholism in his brother; Bipolar Disorder in his brother; Depression in his mother; Diabetes in an other family member; No Known Problems in his father and sister; Prostate Cancer in his maternal grandfather.    Social History:   reports that he has quit smoking. His smoking use included other. He has quit  "using smokeless tobacco.  His smokeless tobacco use included chew. He reports current alcohol use. He reports that he does not use drugs.  PCP: Raghavendra Castano     Physical Exam     Patient Vitals for the past 24 hrs:   BP Temp Temp src Pulse Resp SpO2 Height Weight   02/14/23 1528 121/82 96.8  F (36  C) Temporal 81 16 97 % 1.854 m (6' 1\") 102.1 kg (225 lb)        Physical Exam  General: the patient is awake and interactive  HEENT:  Moist mucous membranes, conjunctiva normal  Pulmonary:  CTAB, no wheezing/ronchi/rales. Normal respiratory effort  Cardiovascular:  RRR, no m/r/g, skin well perfused  Abdomen: Soft, nontender, nondistended.  No guarding or rebound.  Rectal (chaperoned): no anal fissure or hemorrhoids.  Musculoskeletal:  Moving 4 extremities grossly wnl, no deformities; no CVA tenderness.  Neuro:  Speech normal, no focal deficits    Emergency Department Course       Imaging:  Chest XR,  PA & LAT   Final Result   IMPRESSION: No acute cardiopulmonary abnormality. Thoracic spine posterior fusion hardware.         Report per radiology    Laboratory:  Labs Ordered and Resulted from Time of ED Arrival to Time of ED Departure   COMPREHENSIVE METABOLIC PANEL - Normal       Result Value    Sodium 141      Potassium 4.2      Chloride 102      Carbon Dioxide (CO2) 29      Anion Gap 10      Urea Nitrogen 12.8      Creatinine 1.04      Calcium 9.7      Glucose 97      Alkaline Phosphatase 45      AST 33      ALT 29      Protein Total 7.8      Albumin 4.9      Bilirubin Total 0.4      GFR Estimate >90     LIPASE - Normal    Lipase 26     TROPONIN T, HIGH SENSITIVITY - Normal    Troponin T, High Sensitivity 7     OCCULT BLOOD STOOL - Normal    Occult Blood Negative     UA MACROSCOPIC WITH REFLEX TO MICRO AND CULTURE - Normal    Color Urine Straw      Appearance Urine Clear      Glucose Urine Negative      Bilirubin Urine Negative      Ketones Urine Negative      Specific Gravity Urine 1.010      Blood Urine " Negative      pH Urine 7.0      Protein Albumin Urine Negative      Urobilinogen Urine Normal      Nitrite Urine Negative      Leukocyte Esterase Urine Negative     CBC WITH PLATELETS AND DIFFERENTIAL    WBC Count 5.3      RBC Count 5.02      Hemoglobin 15.4      Hematocrit 45.4      MCV 90      MCH 30.7      MCHC 33.9      RDW 12.9      Platelet Count 218      % Neutrophils 62      % Lymphocytes 28      % Monocytes 7      % Eosinophils 2      % Basophils 1      % Immature Granulocytes 0      NRBCs per 100 WBC 0      Absolute Neutrophils 3.3      Absolute Lymphocytes 1.5      Absolute Monocytes 0.4      Absolute Eosinophils 0.1      Absolute Basophils 0.1      Absolute Immature Granulocytes 0.0      Absolute NRBCs 0.0          Procedures   None    Emergency Department Course & Assessments:      Interventions:  Medications - No data to display     Independent Interpretation (X-rays, CTs, rhythm strip):  Chest x-ray shows no pneumothorax.    Consultations/Discussion of Management or Tests:  None       Social Determinants of Health affecting care:   None      Disposition:  The patient was discharged to home.     Impression & Plan      Medical Decision Makin-year-old male with history of thoracic fusion presenting to the ER for evaluation of 2.5 months of intermittent abdominal and bilateral flank pain.  Please above for details of HPI and exam.  Occasionally, patient also reports left-sided chest pain that is sharp in nature.  He is afebrile vitally stable.  His abdominal exam is completely benign without any focal tenderness to suggest intra-abdominal catastrophe or surgical abdomen.  I do not feel that CT imaging is indicated at this time.  EKG was reviewed showing no acute ischemic appearing changes or signs of pericarditis.  High-sensitivity troponin is normal despite intermittent symptoms for the last few months.  I feel this otherwise rules out ACS, furthermore, patient has a low heart score.  He is low  risk for PE and is PERC negative making PE unlikely.  Chest x-ray shows no acute abnormalities.  The rest of his basic lab studies are reassuring including LFTs and lipase.  UA shows no signs of infection or hematuria concerning for kidney stone.  Overall, unclear cause for patient's intermittent etiology of his symptoms for the last 2.5 months but given his well appearance and reassuring work-up here, I do feel that he is appropriate for outpatient discharge to follow-up with his PCP regarding his visit today which she is comfortable with.  Patient understands the uncertainty of the diagnosis but is comfortable with this at this time.  I discussed reasons to return the ER.  All questions were answered prior to patient's discharge.    Diagnosis:    ICD-10-CM    1. Intermittent abdominal pain  R10.9       2. Flank pain  R10.9     intermittent      3. Intermittent left-sided chest pain  R07.89            Discharge Medications:  New Prescriptions    No medications on file     2/14/2023   No att. providers found        Nguyễn Cameron MD  02/14/23 [id number]

## 2023-02-14 NOTE — ED TRIAGE NOTES
Pt. Presents to ED with complaints of abdominal pain, bilateral flank pain, loose stools, nausea, and chest pain that is intermittently sharp under his L pectoral. Pt. Reports pain worsens with certain positions. Reports a hx of thoracic spine fusion (T6-8) 9/19, has chronic pain and therapy. Denies fevers or chills. Reports black tarry stools. Denies ibuprofen, reports drinking alcohol recreationally, reports can of beer last night worsened his pain, denies withdrawal from ETOH. AVSS on RA.

## 2023-02-14 NOTE — ED PROVIDER NOTES
PIT/Triage Evaluation    32-year-old male presenting to the ER for evaluation of 2.5 months of intermittent abdominal bilateral flank pain.  This is also associated with occasional and intermittent sharp left-sided chest pain.  Reports remote history of prior thoracic spine surgery.  Denies any fever, extremity numbness/tingling or weakness.  Denies any aggravation/relieving factors of the abdominal pain or chest pain.  Denies shortness of breath, leg swelling, prior history of VTE.  Reports nausea without vomiting and has had intermittent diarrhea with occasionally tarry stool.  Denies any urinary symptoms.    Reports none of the symptoms have worsened recently but he has been putting his off and would like to be evaluated for it.    Exam is notable for:  General: the patient is awake and interactive  HEENT:  Moist mucous membranes, conjunctiva normal  Pulmonary:  Normal respiratory effort  Cardiovascular:  RRR, no m/r/g, skin well perfused  Abdomen: Soft, nontender, nondistended.  No guarding or rebound.  Musculoskeletal:  Moving 4 extremities grossly wnl, no deformities; no CVA tenderness.  Neuro:  Speech normal, no focal deficits      Appropriate interventions for symptom management were initiated if applicable.  Appropriate diagnostic tests were initiated if indicated.    Important information for subsequent clinician:  32-year-old male with 2.5 months of intermittent abdominal pain and bilateral flank pain.  Also reports intermittent sharp left-sided chest pain.  No fever, shortness of breath, abdominal surgeries.    I briefly evaluated the patient and developed an initial plan of care. I discussed this plan and explained that this brief interaction does not constitute a full evaluation. Patient/family understands that they should wait to be fully evaluated and discuss any test results with another clinician prior to leaving the hospital.      Nguyễn Cameron MD  02/14/23 [id number]

## 2023-02-15 LAB
ATRIAL RATE - MUSE: 57 BPM
DIASTOLIC BLOOD PRESSURE - MUSE: NORMAL MMHG
INTERPRETATION ECG - MUSE: NORMAL
P AXIS - MUSE: 64 DEGREES
PR INTERVAL - MUSE: 182 MS
QRS DURATION - MUSE: 100 MS
QT - MUSE: 392 MS
QTC - MUSE: 381 MS
R AXIS - MUSE: 42 DEGREES
SYSTOLIC BLOOD PRESSURE - MUSE: NORMAL MMHG
T AXIS - MUSE: 19 DEGREES
VENTRICULAR RATE- MUSE: 57 BPM

## 2023-02-15 NOTE — DISCHARGE INSTRUCTIONS
Discharge Instructions  Abdominal Pain    Abdominal pain (belly pain) can be caused by many things. Your evaluation today does not show the exact cause for your pain. Your provider today has decided that it is unlikely your pain is due to a life threatening problem, or a problem requiring surgery or hospital admission. Sometimes those problems cannot be found right away, so it is very important that you follow up as directed.  Sometimes only the changes which occur over time allow the cause of your pain to be found.    Generally, every Emergency Department visit should have a follow-up clinic visit with either a primary or a specialty clinic/provider. Please follow-up as instructed by your emergency provider today. With abdominal pain, we often recommend very close follow-up, such as the following day.    ADULTS:  Return to the Emergency Department right away if:    You get an oral temperature above 102oF or as directed by your provider.  You have blood in your stools. This may be bright red or appear as black, tarry stools.    You keep vomiting (throwing up) or cannot drink liquids.  You see blood when you vomit.   You cannot have a bowel movement or you cannot pass gas.  Your stomach gets bloated or bigger.  Your skin or the whites of your eyes look yellow.  You faint.  You have bloody, frequent or painful urination (peeing).  You have new symptoms or anything that worries you.    CHILDREN:  Return to the Emergency Department right away if your child has any of the above-listed symptoms or the following:    Pushes your hand away or screams/cries when his/her belly is touched.  You notice your child is very fussy or weak.  Your child is very tired and is too tired to eat or drink.  Your child is dehydrated.  Signs of dehydration can be:  Significant change in the amount of wet diapers/urine.  Your infant or child starts to have dry mouth and lips, or no saliva (spit) or tears.    PREGNANT WOMEN:  Return to the  Emergency Department right away if you have any of the above-listed symptoms or the following:    You have bleeding, leaking fluid or passing tissue from the vagina.  You have worse pain or cramping, or pain in your shoulder or back.  You have vomiting that will not stop.  You have a temperature of 100oF or more.  Your baby is not moving as much as usual.  You faint.  You get a bad headache with or without eye problems and abdominal pain.  You have a seizure.  You have unusual discharge from your vagina and abdominal pain.    Abdominal pain is pretty common during pregnancy.  Your pain may or may not be related to your pregnancy. You should follow-up closely with your OB provider so they can evaluate you and your baby.  Until you follow-up with your regular provider, do the following:     Avoid sex and do not put anything in your vagina.  Drink clear fluids.  Only take medications approved by your provider.    MORE INFORMATION:    Appendicitis:  A possible cause of abdominal pain in any person who still has their appendix is acute appendicitis. Appendicitis is often hard to diagnose.  Testing does not always rule out early appendicitis or other causes of abdominal pain. Close follow-up with your provider and re-evaluations may be needed to figure out the reason for your abdominal pain.    Follow-up:  It is very important that you make an appointment with your clinic and go to the appointment.  If you do not follow-up with your primary provider, it may result in missing an important development which could result in permanent injury or disability and/or lasting pain.  If there is any problem keeping your appointment, call your provider or return to the Emergency Department.    Medications:  Take your medications as directed by your provider today.  Before using over-the-counter medications, ask your provider and make sure to take the medications as directed.  If you have any questions about medications, ask your  "provider.    Diet:  Resume your normal diet as much as possible, but do not eat fried, fatty or spicy foods while you have pain.  Do not drink alcohol or have caffeine.  Do not smoke tobacco.    Probiotics: If you have been given an antibiotic, you may want to also take a probiotic pill or eat yogurt with live cultures. Probiotics have \"good bacteria\" to help your intestines stay healthy. Studies have shown that probiotics help prevent diarrhea (loose stools) and other intestine problems (including C. diff infection) when you take antibiotics. You can buy these without a prescription in the pharmacy section of the store.     If you were given a prescription for medicine here today, be sure to read all of the information (including the package insert) that comes with your prescription.  This will include important information about the medicine, its side effects, and any warnings that you need to know about.  The pharmacist who fills the prescription can provide more information and answer questions you may have about the medicine.  If you have questions or concerns that the pharmacist cannot address, please call or return to the Emergency Department.       Remember that you can always come back to the Emergency Department if you are not able to see your regular provider in the amount of time listed above, if you get any new symptoms, or if there is anything that worries you.        Discharge Instructions  Chest Pain    You have been seen today for chest pain or discomfort.  At this time, your provider has found no signs that your chest pain is due to a serious or life-threatening condition, (or you have declined more testing and/or admission to the hospital). However, sometimes there is a serious problem that does not show up right away. Your evaluation today may not be complete and you may need further testing and evaluation.     Generally, every Emergency Department visit should have a follow-up clinic visit with " either a primary or a specialty clinic/provider. Please follow-up as instructed by your emergency provider today.  Return to the Emergency Department if:  Your chest pain changes, gets worse, starts to happen more often, or comes with less activity.  You are newly short of breath.  You get very weak or tired.  You pass out or faint.  You have any new symptoms, like fever, cough, numb legs, or you cough up blood.  You have anything else that worries you.    Until you follow-up with your regular provider, please do the following:  Take one aspirin daily unless you have an allergy or are told not to by your provider.  If a stress test appointment has been made, go to the appointment.  If you have questions, contact your regular provider.  Follow-up with your regular provider/clinic as directed; this is very important.    If you were given a prescription for medicine here today, be sure to read all of the information (including the package insert) that comes with your prescription.  This will include important information about the medicine, its side effects, and any warnings that you need to know about.  The pharmacist who fills the prescription can provide more information and answer questions you may have about the medicine.  If you have questions or concerns that the pharmacist cannot address, please call or return to the Emergency Department.       Remember that you can always come back to the Emergency Department if you are not able to see your regular provider in the amount of time listed above, if you get any new symptoms, or if there is anything that worries you.

## 2023-04-09 ENCOUNTER — OFFICE VISIT (OUTPATIENT)
Dept: URGENT CARE | Facility: URGENT CARE | Age: 33
End: 2023-04-09
Payer: COMMERCIAL

## 2023-04-09 VITALS
HEART RATE: 73 BPM | TEMPERATURE: 98.6 F | OXYGEN SATURATION: 97 % | DIASTOLIC BLOOD PRESSURE: 70 MMHG | HEIGHT: 73 IN | BODY MASS INDEX: 27.04 KG/M2 | WEIGHT: 204 LBS | RESPIRATION RATE: 18 BRPM | SYSTOLIC BLOOD PRESSURE: 130 MMHG

## 2023-04-09 DIAGNOSIS — R10.84 ABDOMINAL PAIN, GENERALIZED: Primary | ICD-10-CM

## 2023-04-09 DIAGNOSIS — R21 RASH AND NONSPECIFIC SKIN ERUPTION: ICD-10-CM

## 2023-04-09 LAB
BASOPHILS # BLD AUTO: 0 10E3/UL (ref 0–0.2)
BASOPHILS NFR BLD AUTO: 0 %
EOSINOPHIL # BLD AUTO: 0.2 10E3/UL (ref 0–0.7)
EOSINOPHIL NFR BLD AUTO: 3 %
ERYTHROCYTE [DISTWIDTH] IN BLOOD BY AUTOMATED COUNT: 12.8 % (ref 10–15)
HCT VFR BLD AUTO: 44.1 % (ref 40–53)
HGB BLD-MCNC: 15 G/DL (ref 13.3–17.7)
LYMPHOCYTES # BLD AUTO: 1.7 10E3/UL (ref 0.8–5.3)
LYMPHOCYTES NFR BLD AUTO: 29 %
MCH RBC QN AUTO: 30.6 PG (ref 26.5–33)
MCHC RBC AUTO-ENTMCNC: 34 G/DL (ref 31.5–36.5)
MCV RBC AUTO: 90 FL (ref 78–100)
MONOCYTES # BLD AUTO: 0.5 10E3/UL (ref 0–1.3)
MONOCYTES NFR BLD AUTO: 8 %
NEUTROPHILS # BLD AUTO: 3.6 10E3/UL (ref 1.6–8.3)
NEUTROPHILS NFR BLD AUTO: 60 %
PLATELET # BLD AUTO: 212 10E3/UL (ref 150–450)
RBC # BLD AUTO: 4.9 10E6/UL (ref 4.4–5.9)
WBC # BLD AUTO: 6 10E3/UL (ref 4–11)

## 2023-04-09 PROCEDURE — 99204 OFFICE O/P NEW MOD 45 MIN: CPT | Performed by: FAMILY MEDICINE

## 2023-04-09 PROCEDURE — 85025 COMPLETE CBC W/AUTO DIFF WBC: CPT | Performed by: FAMILY MEDICINE

## 2023-04-09 PROCEDURE — 36415 COLL VENOUS BLD VENIPUNCTURE: CPT | Performed by: FAMILY MEDICINE

## 2023-04-09 RX ORDER — SULFAMETHOXAZOLE/TRIMETHOPRIM 800-160 MG
1 TABLET ORAL 2 TIMES DAILY
Qty: 20 TABLET | Refills: 0 | Status: SHIPPED | OUTPATIENT
Start: 2023-04-09 | End: 2023-04-19

## 2023-04-09 NOTE — PROGRESS NOTES
"SUBJECTIVE:  Chief Complaint   Patient presents with     Urgent Care     Abdominal Pain     Stomach pain on left side. Also having white bumps inside nose, concern of staph infection. Some confusion and headaches. Symptoms started in January. Sometimes eyes get red and rashy. Was in ED in Feb and they couldn't find anything.      Hong Santiago is a 32 year old male who presents with a chief complaint of abdominal pain and concerns for staph infection in nose.    Reviewed ER 2/14 for intermittent abdominal pain - CXR and labs unremarkable.    Abdominal - left sided and bilateral flank, brief episode, but will occur on a daily basis but last only a few minutes.  No known triggers.  BM normal, generally not constipated.    States that gets confused at times so does not remember thing.    In addition, is concerned about rash on face, is around chin, nose, mouth and also notice on forehead and is worried that the infection is going into his brain as he has been having headache.  States that noted yellowed scales but has cleaned it off.    Past Medical History:   Diagnosis Date     Anxiety      Current Outpatient Medications   Medication Sig Dispense Refill     clonazePAM (KLONOPIN) 0.5 MG tablet Take 1 tablet by mouth twice a day       fluvoxaMINE Maleate (LUVOX CR) 100 MG 24 hr capsule TAKE 4 CAPSULES BY MOUTH ONCE DAILY       HEMP OIL OR EXTRACT OR OTHER CBD CANNABINOID, NOT MEDICAL CANNABIS,        mirtazapine (REMERON) 15 MG tablet        Social History     Tobacco Use     Smoking status: Former     Types: Other     Smokeless tobacco: Former     Types: Chew   Vaping Use     Vaping status: Not on file   Substance Use Topics     Alcohol use: Yes     Comment: occasionally       ROS:  Review of systems negative except as stated above.    EXAM:   /70   Pulse 73   Temp 98.6  F (37  C) (Temporal)   Resp 18   Ht 1.854 m (6' 1\")   Wt 92.5 kg (204 lb)   SpO2 97%   BMI 26.91 kg/m    GENERAL APPEARANCE: healthy, " alert and no distress  ABD: soft, nontender  SKIN: few scattered maculopapules on forehead and perioral, no pustules, no yellowed scaling mattering  PSYCH:alert, affect bright    Results for orders placed or performed in visit on 04/09/23   CBC with platelets and differential     Status: None   Result Value Ref Range    WBC Count 6.0 4.0 - 11.0 10e3/uL    RBC Count 4.90 4.40 - 5.90 10e6/uL    Hemoglobin 15.0 13.3 - 17.7 g/dL    Hematocrit 44.1 40.0 - 53.0 %    MCV 90 78 - 100 fL    MCH 30.6 26.5 - 33.0 pg    MCHC 34.0 31.5 - 36.5 g/dL    RDW 12.8 10.0 - 15.0 %    Platelet Count 212 150 - 450 10e3/uL    % Neutrophils 60 %    % Lymphocytes 29 %    % Monocytes 8 %    % Eosinophils 3 %    % Basophils 0 %    Absolute Neutrophils 3.6 1.6 - 8.3 10e3/uL    Absolute Lymphocytes 1.7 0.8 - 5.3 10e3/uL    Absolute Monocytes 0.5 0.0 - 1.3 10e3/uL    Absolute Eosinophils 0.2 0.0 - 0.7 10e3/uL    Absolute Basophils 0.0 0.0 - 0.2 10e3/uL   CBC with platelets and differential     Status: None    Narrative    The following orders were created for panel order CBC with platelets and differential.  Procedure                               Abnormality         Status                     ---------                               -----------         ------                     CBC with platelets and d...[[id number]]                      Final result                 Please view results for these tests on the individual orders.       ASSESSMENT/PLAN:  (R10.84) Abdominal pain, generalized  (primary encounter diagnosis)  Plan: CBC with platelets and differential            (R21) Rash and nonspecific skin eruption  Comment: face  Plan: sulfamethoxazole-trimethoprim (BACTRIM DS)         800-160 MG tablet            Reassurance given, patient is not in acute distress and non-toxic appearing, vitals stable.  Recommend that abdominal pain - chronic, intermittent will require further evaluation and this needs to be thru primary provider.  Discussed  probable folliculitis or MRSA rash and will treat with RX Bactrim DS.      Follow up with primary provider in 1-2 weeks    Daryn Solis MD  April 9, 2023 3:35 PM

## 2023-11-07 ENCOUNTER — OFFICE VISIT (OUTPATIENT)
Dept: FAMILY MEDICINE | Facility: CLINIC | Age: 33
End: 2023-11-07

## 2023-11-07 VITALS
BODY MASS INDEX: 27.84 KG/M2 | SYSTOLIC BLOOD PRESSURE: 130 MMHG | DIASTOLIC BLOOD PRESSURE: 70 MMHG | TEMPERATURE: 98.3 F | WEIGHT: 211 LBS | HEART RATE: 75 BPM | OXYGEN SATURATION: 96 %

## 2023-11-07 DIAGNOSIS — R42 LIGHTHEADEDNESS: ICD-10-CM

## 2023-11-07 DIAGNOSIS — R00.2 PALPITATIONS: ICD-10-CM

## 2023-11-07 DIAGNOSIS — R26.89 LOSS OF BALANCE: Primary | ICD-10-CM

## 2023-11-07 LAB
ERYTHROCYTE [DISTWIDTH] IN BLOOD BY AUTOMATED COUNT: 13 %
HCT VFR BLD AUTO: 45.2 % (ref 40–53)
HEMOGLOBIN: 14.6 G/DL (ref 13.3–17.7)
MCH RBC QN AUTO: 30.4 PG (ref 26–33)
MCHC RBC AUTO-ENTMCNC: 32.3 G/DL (ref 31–36)
MCV RBC AUTO: 93.9 FL (ref 78–100)
PLATELET COUNT - QUEST: 197 10^9/L (ref 150–375)
RBC # BLD AUTO: 4.81 10*12/L (ref 4.4–5.9)
WBC # BLD AUTO: 6.1 10*9/L (ref 4–11)

## 2023-11-07 PROCEDURE — 99213 OFFICE O/P EST LOW 20 MIN: CPT

## 2023-11-07 PROCEDURE — 80053 COMPREHEN METABOLIC PANEL: CPT

## 2023-11-07 PROCEDURE — 85027 COMPLETE CBC AUTOMATED: CPT

## 2023-11-07 PROCEDURE — 84443 ASSAY THYROID STIM HORMONE: CPT | Mod: 90

## 2023-11-07 PROCEDURE — 93000 ELECTROCARDIOGRAM COMPLETE: CPT

## 2023-11-07 PROCEDURE — 84439 ASSAY OF FREE THYROXINE: CPT | Mod: 90

## 2023-11-07 PROCEDURE — 36415 COLL VENOUS BLD VENIPUNCTURE: CPT

## 2023-11-07 RX ORDER — FLUTICASONE PROPIONATE 50 MCG
SPRAY, SUSPENSION (ML) NASAL
COMMUNITY
Start: 2023-10-16 | End: 2023-11-07

## 2023-11-07 RX ORDER — MIRTAZAPINE 30 MG/1
TABLET, FILM COATED ORAL
COMMUNITY
Start: 2023-10-13 | End: 2024-08-01

## 2023-11-07 NOTE — PROGRESS NOTES
"Assessment & Plan     1. Loss of balance  - Unclear etiology of patient's loss of balance episode and other symptoms. Discussed it may have been a seizure but it is difficult to fully determine. CBC, CMP, EKG are within normal limits. Thyroid labs are pending. I have placed an order for a CT of his head and also a neurology referral for further workup. Patient was instructed to go to the ER if he has another similar episode and/or his symptoms worsen.     2. Lightheadedness  - See above.  - VENOUS COLLECTION  - Hemogram Platelet ([facility])  - Comprehensive Metobolic Panel ([facility])  - CT Head w/o Contrast; Future  - Radiology Referral  - Adult Neurology  Referral - To a [facility] Location (Use POS/Location)    3. Palpitations  - EKG within normal limits, thyroid labs pending, patient will be notified of results.   - EKG 12-lead complete w/read - Clinics  - TSH ([facility])  - T4 FREE ([facility])    Will follow up with thyroid lab results and CT scan results. Reasons to follow-up sooner or seek emergent care reviewed.     Xochilt Henderson PA-C  [facility]       Subjective     Hong Santiago is a 32 year old male who presents to clinic today for the following health issues:    HPI   Chief Complaint   Patient presents with     Follow Up     Pt  says he experienced an episode of confusion one day where he felt half out of it \" half dead half alive.\"  He became very off balance and couldn't get with it.  He has noticed little headaches all around his head since. PT mentioned  \"mini stroke\" He feels very lethargic, speaking at a certain frequency hurts his head. His memory feels effective. Seems jittery. Occurred October 26th.      Que presents with concerns of a recent worrisome episode. He notes the episode occurred on 10/26/23. He has never had anything like this before in the past. He notes he was working on his sports cars and then got up to go to the bathroom. As he was walking to the " bathroom, he became very off balance, his legs were shaking and swaying side to side, and he was in and out of consciousness. He then called for help to his mom who when she came down, he was feeling back to normal. He did not go to the ER however notes he felt as though he was dying during that episode. He denies any falls, completely losing consciousness, pain of any kind, or blurry vision. He denies any major recent illnesses, although notes he was treated with antibiotics (amoxicillin) for a sinus infection two weeks before this episode occurred.     Since the episode, he has had a lingering headache that hasn't gone away and in general feels as though his memory has gotten hazy. Also notes some palpitations and increased anxiety over this episode. He denies any dizziness, blurry vision, fevers/chills, difficulty breathing, etc. He does not have a history of seizures.     Past medical history and daily medications reviewed.       Objective    /70 (BP Location: Right arm, Patient Position: Sitting, Cuff Size: Adult Large)   Pulse 75   Temp 98.3  F (36.8  C) (Temporal)   Wt 95.7 kg (211 lb)   SpO2 96%   BMI 27.84 kg/m    Body mass index is 27.84 kg/m .    Physical Exam:  GENERAL: healthy, alert and no distress  EYES: Eyes grossly normal to inspection, PERRL and conjunctivae and sclerae normal  HENT: ear canals and TM's normal, nose and mouth without ulcers or lesions  NECK: no adenopathy, no asymmetry, masses, or scars and thyroid normal to palpation  RESP: lungs clear to auscultation - no rales, rhonchi or wheezes  CV: regular rate and rhythm, normal S1 S2, no S3 or S4, no murmur, click or rub, no peripheral edema  ABDOMEN: soft, nontender, no hepatosplenomegaly, no masses and bowel sounds normal  MS: no gross musculoskeletal defects noted, no edema  SKIN: no suspicious lesions or rashes  NEURO: Normal strength and tone, mentation intact and speech normal, CN II-XII are grossly intact  PSYCH: mentation  appears normal, affect normal/bright    Labs reviewed.    EKG: appears normal, NSR, normal axis, normal intervals, no acute ST/T changes c/w ischemia, no LVH by voltage criteria.    Results for orders placed or performed in visit on 11/07/23 (from the past 48 hour(s))   Comprehensive Metobolic Panel ([facility])   Result Value Ref Range    Carbon Dioxide 31.4 20 - 32 mmol/L    Creatinine 0.97 0.60 - 1.30 mg/dL    Glucose 113 (A) 60 - 99 mg/dL    Sodium 140.3 135 - 146 mmol/L    Potassium 4.00 3.5 - 5.3 mmol/L    Chloride 99.5 98 - 110 mmol/L    Protein Total 7.9 6.1 - 8.1 g/dL    Albumin 4.7 3.6 - 5.1 g/dL    Alkaline Phosphatase 38 33 - 130 U/L    ALT 51 (A) 0 - 32 U/L    AST 27 0 - 35 U/L    Bilirubin Total 0.6 0.2 - 1.2 mg/dL    Urea Nitrogen 15 7 - 25 mg/dL    Calcium 10.4 (A) 8.6 - 10.3 mg/dL    BUN/Creatinine Ratio 15.5 6 - 32    Globulin Calculated 3.2 1.9 - 3.7    A/G Ratio 1.5 1 - 2.5   Hemogram Platelet ([facility])   Result Value Ref Range    WBC 6.1 4.0 - 11 10*9/L    RBC Count 4.81 4.4 - 5.9 10*12/L    Hemoglobin 14.6 13.3 - 17.7 g/dL    Hematocrit 45.2 40.0 - 53.0 %    MCV 93.9 78 - 100 fL    MCH 30.4 26 - 33 pg    MCHC 32.3 31 - 36 g/dL    RDW 13.0 %    Platelet Count 197 150 - 375 10^9/L

## 2023-11-07 NOTE — NURSING NOTE
"Chief Complaint   Patient presents with    Follow Up     Pt  says he experienced an episode of confusion one day where he felt half out of it \" half dead half alive.\"  He became very off balance and couldn't get with it.  He has noticed little headaches all around his head since. PT mentioned  \"mini stroke\" He feels very lethargic, speaking at a certain frequency hurts his head. His memory feels effective. Seems jittery. Occurred October 26th.    Pre-visit Screening:  Immunizations:  up to date  Colonoscopy:  na  Mammogram: na  Asthma Action Test/Plan:  na  PHQ9:  na  GAD7:  na  Questioned patient about current smoking habits Pt. has never smoked.  Ok to leave detailed message on voice mail for today's visit only yes, phone # [phone]   "

## 2023-11-07 NOTE — PATIENT INSTRUCTIONS
Labs today, I will send you a message regarding lab results.     CT of head order placed.     Neurology referral also placed.     Please go to the ER if you ever experience any of those symptoms again.

## 2023-11-08 LAB
ALBUMIN SERPL-MCNC: 4.7 G/DL (ref 3.6–5.1)
ALBUMIN/GLOB SERPL: 1.5 {RATIO} (ref 1–2.5)
ALP SERPL-CCNC: 38 U/L (ref 33–130)
ALT 1742-6: 51 U/L (ref 0–32)
AST 1920-8: 27 U/L (ref 0–35)
BILIRUB SERPL-MCNC: 0.6 MG/DL (ref 0.2–1.2)
BUN SERPL-MCNC: 15 MG/DL (ref 7–25)
BUN/CREATININE RATIO: 15.5 (ref 6–32)
CALCIUM SERPL-MCNC: 10.4 MG/DL (ref 8.6–10.3)
CHLORIDE SERPLBLD-SCNC: 99.5 MMOL/L (ref 98–110)
CO2 SERPL-SCNC: 31.4 MMOL/L (ref 20–32)
CREAT SERPL-MCNC: 0.97 MG/DL (ref 0.6–1.3)
GLOBULIN, CALCULATED - QUEST: 3.2 (ref 1.9–3.7)
GLUCOSE SERPL-MCNC: 113 MG/DL (ref 60–99)
POTASSIUM SERPL-SCNC: 4 MMOL/L (ref 3.5–5.3)
PROT SERPL-MCNC: 7.9 G/DL (ref 6.1–8.1)
SODIUM SERPL-SCNC: 140.3 MMOL/L (ref 135–146)

## 2023-11-09 LAB
T4, FREE, NON-DIALYSIS - QUEST: 1.1 NG/DL (ref 0.8–1.8)
TSH SERPL-ACNC: 6.7 MIU/L (ref 0.4–4.5)

## 2024-01-26 ENCOUNTER — OFFICE VISIT (OUTPATIENT)
Dept: FAMILY MEDICINE | Facility: CLINIC | Age: 34
End: 2024-01-26

## 2024-01-26 VITALS
SYSTOLIC BLOOD PRESSURE: 130 MMHG | RESPIRATION RATE: 20 BRPM | DIASTOLIC BLOOD PRESSURE: 78 MMHG | OXYGEN SATURATION: 97 % | BODY MASS INDEX: 27.97 KG/M2 | TEMPERATURE: 97.6 F | HEART RATE: 99 BPM | WEIGHT: 212 LBS

## 2024-01-26 DIAGNOSIS — Z11.3 SCREENING FOR STDS (SEXUALLY TRANSMITTED DISEASES): Primary | ICD-10-CM

## 2024-01-26 DIAGNOSIS — F32.A DEPRESSIVE DISORDER: ICD-10-CM

## 2024-01-26 DIAGNOSIS — R74.8 ELEVATED LIVER ENZYMES: ICD-10-CM

## 2024-01-26 DIAGNOSIS — Z71.1 CONCERN ABOUT SKIN DISEASE WITHOUT DIAGNOSIS: ICD-10-CM

## 2024-01-26 DIAGNOSIS — F41.1 GAD (GENERALIZED ANXIETY DISORDER): ICD-10-CM

## 2024-01-26 LAB
ALBUMIN SERPL-MCNC: 4.9 G/DL (ref 3.6–5.1)
ALBUMIN/GLOB SERPL: 1.6 {RATIO} (ref 1–2.5)
ALP SERPL-CCNC: 34 U/L (ref 33–130)
ALT 1742-6: 20 U/L (ref 0–32)
AST 1920-8: 16 U/L (ref 0–35)
BILIRUB SERPL-MCNC: 0.8 MG/DL (ref 0.2–1.2)
BUN SERPL-MCNC: 13 MG/DL (ref 7–25)
BUN/CREATININE RATIO: 11.5 (ref 6–32)
CALCIUM SERPL-MCNC: 10.2 MG/DL (ref 8.6–10.3)
CHLORIDE SERPLBLD-SCNC: 103.2 MMOL/L (ref 98–110)
CO2 SERPL-SCNC: 26.9 MMOL/L (ref 20–32)
CREAT SERPL-MCNC: 1.13 MG/DL (ref 0.6–1.3)
ERYTHROCYTE [DISTWIDTH] IN BLOOD BY AUTOMATED COUNT: 12.3 %
GLOBULIN, CALCULATED - QUEST: 3.1 (ref 1.9–3.7)
GLUCOSE SERPL-MCNC: 118 MG/DL (ref 60–99)
HCT VFR BLD AUTO: 45.6 % (ref 40–53)
HEMOGLOBIN: 15 G/DL (ref 13.3–17.7)
MCH RBC QN AUTO: 31.1 PG (ref 26–33)
MCHC RBC AUTO-ENTMCNC: 32.9 G/DL (ref 31–36)
MCV RBC AUTO: 94.6 FL (ref 78–100)
PLATELET COUNT - QUEST: 224 10^9/L (ref 150–375)
POTASSIUM SERPL-SCNC: 4.15 MMOL/L (ref 3.5–5.3)
PROT SERPL-MCNC: 8 G/DL (ref 6.1–8.1)
RBC # BLD AUTO: 4.82 10*12/L (ref 4.4–5.9)
SODIUM SERPL-SCNC: 140.1 MMOL/L (ref 135–146)
WBC # BLD AUTO: 5 10*9/L (ref 4–11)

## 2024-01-26 PROCEDURE — 87491 CHLMYD TRACH DNA AMP PROBE: CPT | Mod: 90

## 2024-01-26 PROCEDURE — 86803 HEPATITIS C AB TEST: CPT | Mod: 90

## 2024-01-26 PROCEDURE — 86593 SYPHILIS TEST NON-TREP QUANT: CPT | Mod: 90

## 2024-01-26 PROCEDURE — 87389 HIV-1 AG W/HIV-1&-2 AB AG IA: CPT | Mod: 90

## 2024-01-26 PROCEDURE — 86780 TREPONEMA PALLIDUM: CPT | Mod: 90

## 2024-01-26 PROCEDURE — 85027 COMPLETE CBC AUTOMATED: CPT

## 2024-01-26 PROCEDURE — 99213 OFFICE O/P EST LOW 20 MIN: CPT

## 2024-01-26 PROCEDURE — 80053 COMPREHEN METABOLIC PANEL: CPT

## 2024-01-26 PROCEDURE — 87591 N.GONORRHOEAE DNA AMP PROB: CPT | Mod: 90

## 2024-01-26 PROCEDURE — 36415 COLL VENOUS BLD VENIPUNCTURE: CPT

## 2024-01-26 NOTE — PATIENT INSTRUCTIONS
Lab results pending, I will send you a my chart message.     Dermatology referral placed to [facility], someone will contact you within a week to get scheduled.     Please let me know if you have any trouble getting in to see a therapist and I can place a referral on my end to see if we can get you in sooner.

## 2024-01-26 NOTE — NURSING NOTE
Chief Complaint   Patient presents with    STD     Wants to do STD testing, has not had testing done in awhile but has had some blistering happen around his nose that come and go, feels that sometimes he notices a rash near his hairline and not sure if this is sexually transmitted, worried about herpes and this turning into encephalitis      Pre-visit Screening:  Immunizations:  up to date  Colonoscopy:  na  Mammogram: na  Asthma Action Test/Plan:  na  PHQ9:  na  GAD7:  na  Questioned patient about current smoking habits Pt. quit smoking some time ago.  Ok to leave detailed message on voice mail for today's visit only yes, phone # [phone] (home) [phone] (work)

## 2024-01-26 NOTE — PROGRESS NOTES
Assessment & Plan     1. Screening for STDs (sexually transmitted diseases)  - Discussed with Que I don't believe the skin lesions on his face are related to an STD however will obtain lab work, patient will be notified of results.   - VENOUS COLLECTION  - HEPATITIS C ANTIBODY ([facility])  - HIV 1/2 Agn Brenda 4th Gen w Reflex ([facility])  - RPR with Rflx to Titer and Confirm ([facility])  - Chlamydia Trach/N. Gonorrhoeae RNA TMA(Pap, Swab,Urine)([facility])    2. Elevated liver enzymes  - Lab work pending, patient will be notified of results.   - VENOUS COLLECTION  - Comprehensive Metobolic Panel ([facility])    3. Concern about skin disease without diagnosis  - Lab work pending. I also placed a dermatology referral to better address the skin lesions he has been having.   - Hemogram Platelet ([facility])  - Adult Dermatology  Referral - To a [facility] Location (Use POS/Location)    4. EDWARD (generalized anxiety disorder)  - Que was anxious and tearful throughout today's encounter and shared with me that he does not have many family members or friends to talk to about many things in his life, I encouraged him to ask his psychiatrist about seeing a therapist and he will keep me updated on this and whether he needs a referral from me. Patient denies any SI/HI. Return to clinic and ER precautions discussed.     5. Depressive disorder  - See above.     Will follow up pending lab results. Reasons to follow-up sooner or seek emergent care reviewed.     Xochilt Henderson PA-C  [facility]       Subjective     Hong Santiago is a 33 year old male who presents to clinic today for the following health issues:    HPI   Chief Complaint   Patient presents with     STD     Wants to do STD testing, has not had testing done in awhile but has had some blistering happen around his nose that come and go, feels that sometimes he notices a rash near his hairline and not sure if this is sexually transmitted, worried about herpes and  this turning into encephalitis       Que presents with concerns of STD testing. He states he last had unprotected sexual intercourse about 11 months ago with a female partner and since then has noticed getting white dots around his nose that comes and goes. He notes he first noticed this happening about 1-2 days afterwards. He's also noticed similar bumps inside of his ears. He denies any pain or discharge from these spots or any rashes on his skin. He also denies any fevers/chills, genital lesions, or penile discharge. He does not have a history of STDs and notes it has been a while since he's last been tested. Que notes he has been very anxious and stressed about possibly having an untreated STD and this turning into a more serious infection. He also would like to recheck his liver enzymes as one of the levels (ALT) was slightly elevated at 51.     Past medical history and daily medications reviewed.       Objective    /78 (BP Location: Right arm, Patient Position: Sitting, Cuff Size: Adult Large)   Pulse 99   Temp 97.6  F (36.4  C) (Temporal)   Resp 20   Wt 96.2 kg (212 lb)   SpO2 97%   BMI 27.97 kg/m    Body mass index is 27.97 kg/m .    Physical Examination:  GENERAL: healthy, alert and no distress  EYES: Eyes grossly normal to inspection, PERRL and conjunctivae and sclerae normal  HENT: ear canals and TM's normal, nose and mouth without ulcers or lesions  NECK: no adenopathy, no asymmetry, masses, or scars and thyroid normal to palpation  RESP: lungs clear to auscultation - no rales, rhonchi or wheezes  CV: regular rate and rhythm, normal S1 S2, no S3 or S4, no murmur, click or rub, no peripheral edema   ABDOMEN: soft, nontender, no masses  MS: no gross musculoskeletal defects noted, no edema  SKIN: no suspicious lesions or rashes  PSYCH: mentation appears normal, affect is emotional and anxious    Lab work pending.

## 2024-01-27 LAB
CHLAMYDIA TRACHOMATIS RNA, TMA - QUEST: NOT DETECTED
NEISSERIA GONORRHOEAE RNA TMA: NOT DETECTED
SEE NOTE - QUEST: NORMAL

## 2024-01-29 LAB
HCV AB - QUEST: NORMAL
HIV 1/2 AGN ABY 4TH GEN WITH REFLEX: NORMAL
RPR SCREEN - QUEST: NORMAL

## 2024-03-16 ENCOUNTER — HEALTH MAINTENANCE LETTER (OUTPATIENT)
Age: 34
End: 2024-03-16

## 2024-06-17 PROBLEM — Z76.89 HEALTH CARE HOME: Status: RESOLVED | Noted: 2018-08-16 | Resolved: 2024-06-17

## 2024-08-01 ENCOUNTER — OFFICE VISIT (OUTPATIENT)
Dept: FAMILY MEDICINE | Facility: CLINIC | Age: 34
End: 2024-08-01

## 2024-08-01 VITALS
DIASTOLIC BLOOD PRESSURE: 80 MMHG | BODY MASS INDEX: 24.26 KG/M2 | WEIGHT: 189 LBS | SYSTOLIC BLOOD PRESSURE: 128 MMHG | HEART RATE: 60 BPM | OXYGEN SATURATION: 93 % | HEIGHT: 74 IN | TEMPERATURE: 97 F

## 2024-08-01 DIAGNOSIS — Z00.00 ROUTINE GENERAL MEDICAL EXAMINATION AT A HEALTH CARE FACILITY: Primary | ICD-10-CM

## 2024-08-01 DIAGNOSIS — Z13.220 SCREENING FOR LIPOID DISORDERS: ICD-10-CM

## 2024-08-01 DIAGNOSIS — F32.A DEPRESSIVE DISORDER: ICD-10-CM

## 2024-08-01 DIAGNOSIS — R53.83 FATIGUE: ICD-10-CM

## 2024-08-01 DIAGNOSIS — F41.1 GAD (GENERALIZED ANXIETY DISORDER): ICD-10-CM

## 2024-08-01 DIAGNOSIS — Z13.1 SCREENING FOR DIABETES MELLITUS: ICD-10-CM

## 2024-08-01 PROCEDURE — 99395 PREV VISIT EST AGE 18-39: CPT | Performed by: FAMILY MEDICINE

## 2024-08-01 NOTE — PROGRESS NOTES
"3  SUBJECTIVE:   CC: Hong Santiago is an 33 year old male who presents for preventive health visit.       Patient has been advised of split billing requirements and indicates understanding: Yes  Healthy Habits:  Do you get at least three servings of calcium containing foods daily (dairy, green leafy vegetables, etc.)? yes  Amount of exercise or daily activities, outside of work: 5 day(s) per week  Problems taking medications regularly No  Medication side effects: No  Have you had an eye exam in the past two years? no  Do you see a dentist twice per year? yes  Do you have sleep apnea, excessive snoring or daytime drowsiness?no          Today's PHQ-2 Score:       8/1/2024     1:35 PM 1/30/2023    11:15 AM   PHQ-2 ( 1999 [employer])   Q1: Little interest or pleasure in doing things 0 0   Q2: Feeling down, depressed or hopeless 0 0   PHQ-2 Score 0 0       Abuse: Current or Past(Physical, Sexual or Emotional)- No  Do you feel safe in your environment? Yes    Have you ever done Advance Care Planning? (For example, a Health Directive, POLST, or a discussion with a medical provider or your loved ones about your wishes):     Social History     Tobacco Use    Smoking status: Former     Types: Other    Smokeless tobacco: Former     Types: Chew   Substance Use Topics    Alcohol use: Yes     Comment: occasionally     If you drink alcohol do you typically have >3 drinks per day or >7 drinks per week? No                      Last PSA: No results found for: \"PSA\"    Reviewed orders with patient. Reviewed health maintenance and updated orders accordingly - Yes  BP Readings from Last 3 Encounters:   08/01/24 128/80   01/26/24 130/78   11/07/23 130/70    Wt Readings from Last 3 Encounters:   08/01/24 85.7 kg (189 lb)   01/26/24 96.2 kg (212 lb)   11/07/23 95.7 kg (211 lb)                  Patient Active Problem List   Diagnosis    EDWARD (generalized anxiety disorder)    ACP (advance care planning)    Depressive disorder    Other malaise "    S/P fusion of thoracic spine    Acquired hallux valgus    Attention deficit disorder     Past Surgical History:   Procedure Laterality Date    FUSION SPINE POSTERIOR MINIMALLY INVASIVE TWO LEVELS N/A 9/19/2019    Procedure: Thoracic 6-8 interbody and posterolateral fusion, minimally invasive epidural steroid injection;  Surgeon: Bobo Lopez MD;  Location: [facility] OR    ORTHOPEDIC SURGERY Right 2010    bunion       Social History     Tobacco Use    Smoking status: Former     Types: Other    Smokeless tobacco: Current     Types: Chew    Tobacco comments:     Nicotine pouches   Substance Use Topics    Alcohol use: Not Currently     Comment: occasionally     Family History   Problem Relation Age of Onset    Depression Mother     No Known Problems Father     No Known Problems Sister     Bipolar Disorder Brother     Alcoholism Brother     Prostate Cancer Maternal Grandfather     Diabetes Other         mat uncle    Cerebrovascular Disease No family hx of     Coronary Artery Disease No family hx of     Colon Cancer No family hx of     Hyperlipidemia No family hx of     Hypertension No family hx of          Current Outpatient Medications   Medication Sig Dispense Refill    clonazePAM (KLONOPIN) 0.5 MG tablet Take 1 tablet by mouth twice a day      fluvoxaMINE Maleate (LUVOX CR) 100 MG 24 hr capsule TAKE 4 CAPSULES BY MOUTH ONCE DAILY       No Known Allergies  Recent Labs   Lab Test 01/26/24  0000 11/07/23  1758 11/07/23  0000 02/14/23  1539 01/30/23  0000 09/20/19  0540   LDL  --   --   --   --  134*  --    HDL  --   --   --   --  47  --    TRIG  --   --   --   --  106  --    ALT  --   --   --  29  --   --    CR 1.13  --  0.97 1.04 1.06 0.88   GFRESTIMATED  --   --   --  >90  --  >90   GFRESTBLACK  --   --   --   --   --  >90   POTASSIUM 4.15  --  4.00 4.2 4.28 4.2   TSH  --  6.70*  --   --   --   --         Reviewed and updated as needed this visit by clinical staff   Tobacco  Allergies  Meds              Reviewed and  "updated as needed this visit by Provider                  Past Medical History:   Diagnosis Date    Anxiety       Past Surgical History:   Procedure Laterality Date    FUSION SPINE POSTERIOR MINIMALLY INVASIVE TWO LEVELS N/A 9/19/2019    Procedure: Thoracic 6-8 interbody and posterolateral fusion, minimally invasive epidural steroid injection;  Surgeon: Bobo Lopez MD;  Location: [facility] OR    ORTHOPEDIC SURGERY Right 2010    [facility]       ROS:  CONSTITUTIONAL: NEGATIVE for fever, chills, change in weight  INTEGUMENTARY/SKIN: NEGATIVE for worrisome rashes, moles or lesions  EYES: NEGATIVE for vision changes or irritation  ENT: NEGATIVE for ear, mouth and throat problems  RESP: NEGATIVE for significant cough or SOB  CV: NEGATIVE for chest pain, palpitations or peripheral edema  GI: NEGATIVE for nausea, abdominal pain, heartburn, or change in bowel habits   male: negative for dysuria, hematuria, decreased urinary stream, erectile dysfunction, urethral discharge  MUSCULOSKELETAL: NEGATIVE for significant arthralgias or myalgia  NEURO: NEGATIVE for weakness, dizziness or paresthesias  PSYCHIATRIC: NEGATIVE for changes in mood or affect    OBJECTIVE:   /80 (BP Location: Right arm, Patient Position: Sitting, Cuff Size: Adult Large)   Pulse 60   Temp 97  F (36.1  C) (Temporal)   Ht 1.873 m (6' 1.75\")   Wt 85.7 kg (189 lb)   SpO2 93%   BMI 24.43 kg/m    EXAM:  GENERAL: alert and no distress  EYES: Eyes grossly normal to inspection, PERRL and conjunctivae and sclerae normal  HENT: ear canals and TM's normal, nose and mouth without ulcers or lesions  NECK: no adenopathy, no asymmetry, masses, or scars  RESP: lungs clear to auscultation - no rales, rhonchi or wheezes  CV: regular rate and rhythm, normal S1 S2, no S3 or S4, no murmur, click or rub, no peripheral edema  ABDOMEN: soft, nontender, no hepatosplenomegaly, no masses and bowel sounds normal   (male): normal male genitalia without lesions or urethral " "discharge, no hernia  MS: no gross musculoskeletal defects noted, no edema  SKIN: no suspicious lesions or rashes  NEURO: Normal strength and tone, mentation intact and speech normal  PSYCH: mentation appears normal, affect normal/bright    Diagnostic Test Results:  Labs reviewed in Epic    ASSESSMENT/PLAN:   (Z00.00) Routine general medical examination at a health care facility  (primary encounter diagnosis)  Comment: discussed preventitive healthcare   Plan: Continue to work on healthy diet and exercise, discussed healthy habits     (F32.A) Depressive disorder-psych  Comment: well controlled  Plan: continue current medications at current doses     (F41.1) EDWARD (generalized anxiety disorder)-psych  Comment: well controlled  Plan: continue current medications at current doses     (Z13.220) Screening for lipoid disorders  Comment:   Plan:     (Z13.1) Screening for diabetes mellitus  Comment:   Plan:     Patient has been advised of split billing requirements and indicates understanding: Yes  COUNSELING:  Reviewed preventive health counseling, as reflected in patient instructions       Regular exercise       Healthy diet/nutrition       Vision screening    Estimated body mass index is 24.43 kg/m  as calculated from the following:    Height as of this encounter: 1.873 m (6' 1.75\").    Weight as of this encounter: 85.7 kg (189 lb).        He reports that he has quit smoking. His smoking use included other. He has quit using smokeless tobacco.  His smokeless tobacco use included chew.      Counseling Resources:  ATP IV Guidelines  Pooled Cohorts Equation Calculator  FRAX Risk Assessment  ICSI Preventive Guidelines  Dietary Guidelines for Americans, 2010  USDA's MyPlate  ASA Prophylaxis  Lung CA Screening    Kervin Rapp MD  [facility]  "

## 2024-08-01 NOTE — NURSING NOTE
Chief Complaint   Patient presents with    Physical     Non-fasting, annual.    Consult     Discoloration of skin on bottom of male genitalia.     Pre-visit Screening:  Immunizations:  up to date  Colonoscopy:  na  Mammogram: na  Asthma Action Test/Plan:  na  PHQ9:  phq 2 given  GAD7:  na  Questioned patient about current smoking habits Pt. quit smoking some time ago.  Ok to leave detailed message on voice mail for today's visit only yes, phone # [phone] (home) [phone] (work)

## 2024-08-09 ENCOUNTER — TELEPHONE (OUTPATIENT)
Dept: FAMILY MEDICINE | Facility: CLINIC | Age: 34
End: 2024-08-09

## 2024-08-09 NOTE — TELEPHONE ENCOUNTER
Pt called the lab wondering if he can also have his testosterone checked at his upcoming lab appt on Monday. If ok, please put in standing. Thanks!

## 2024-08-12 NOTE — TELEPHONE ENCOUNTER
Pt informed of codes and is calling insurance to see if testosterone is covered. Order put in future, he will call back to schedule lab only and let us know if he wants the test.

## 2024-08-19 DIAGNOSIS — Z13.1 SCREENING FOR DIABETES MELLITUS: ICD-10-CM

## 2024-08-19 DIAGNOSIS — R53.83 FATIGUE: ICD-10-CM

## 2024-08-19 DIAGNOSIS — Z13.220 SCREENING FOR LIPOID DISORDERS: ICD-10-CM

## 2024-08-19 LAB
ALBUMIN SERPL-MCNC: 4.4 G/DL (ref 3.6–5.1)
ALP SERPL-CCNC: 38 U/L (ref 33–130)
ALT 1742-6: 41 U/L (ref 0–32)
AST 1920-8: 35 U/L (ref 0–35)
BILIRUB SERPL-MCNC: 0.9 MG/DL (ref 0.2–1.2)
BUN SERPL-MCNC: 16 MG/DL (ref 7–25)
BUN/CREATININE RATIO: 16 (ref 6–32)
CALCIUM SERPL-MCNC: 10 MG/DL (ref 8.6–10.3)
CHLORIDE SERPLBLD-SCNC: 100.3 MMOL/L (ref 98–110)
CHOLEST SERPL-MCNC: 173 MG/DL (ref 0–199)
CHOLEST/HDLC SERPL: 3 {RATIO} (ref 0–5)
CO2 SERPL-SCNC: 28 MMOL/L (ref 20–32)
CREAT SERPL-MCNC: 1.03 MG/DL (ref 0.6–1.3)
GLUCOSE SERPL-MCNC: 91 MG/DL (ref 60–99)
HDLC SERPL-MCNC: 57 MG/DL (ref 40–150)
LDLC SERPL CALC-MCNC: 105 MG/DL
POTASSIUM SERPL-SCNC: 4.22 MMOL/L (ref 3.5–5.3)
PROT SERPL-MCNC: 6.8 G/DL (ref 6.1–8.1)
SODIUM SERPL-SCNC: 138 MMOL/L (ref 135–146)
TRIGL SERPL-MCNC: 54 MG/DL (ref 0–149)

## 2024-08-19 PROCEDURE — 84402 ASSAY OF FREE TESTOSTERONE: CPT | Mod: 90 | Performed by: FAMILY MEDICINE

## 2024-08-19 PROCEDURE — 80053 COMPREHEN METABOLIC PANEL: CPT | Performed by: FAMILY MEDICINE

## 2024-08-19 PROCEDURE — 84403 ASSAY OF TOTAL TESTOSTERONE: CPT | Mod: 90 | Performed by: FAMILY MEDICINE

## 2024-08-19 PROCEDURE — 80061 LIPID PANEL: CPT | Performed by: FAMILY MEDICINE

## 2024-08-19 PROCEDURE — 36415 COLL VENOUS BLD VENIPUNCTURE: CPT | Performed by: FAMILY MEDICINE

## 2024-08-22 LAB
TESTOSTERONE FREE LC/MS/MS - QUEST: 108.1 PG/ML (ref 35–155)
TESTOSTERONE, TOTAL, LC/MS/MS-QUEST: 904 NG/DL (ref 250–1100)

## 2024-08-29 ENCOUNTER — MYC MEDICAL ADVICE (OUTPATIENT)
Dept: FAMILY MEDICINE | Facility: CLINIC | Age: 34
End: 2024-08-29

## 2025-01-20 ENCOUNTER — OFFICE VISIT (OUTPATIENT)
Dept: FAMILY MEDICINE | Facility: CLINIC | Age: 35
End: 2025-01-20

## 2025-01-20 VITALS
HEART RATE: 69 BPM | BODY MASS INDEX: 24.69 KG/M2 | DIASTOLIC BLOOD PRESSURE: 72 MMHG | OXYGEN SATURATION: 97 % | WEIGHT: 191 LBS | TEMPERATURE: 97.6 F | SYSTOLIC BLOOD PRESSURE: 126 MMHG

## 2025-01-20 DIAGNOSIS — G89.4 CHRONIC PAIN DISORDER: Primary | ICD-10-CM

## 2025-01-20 DIAGNOSIS — F32.A DEPRESSIVE DISORDER: ICD-10-CM

## 2025-01-20 DIAGNOSIS — M54.9 CHRONIC BACK PAIN, UNSPECIFIED BACK LOCATION, UNSPECIFIED BACK PAIN LATERALITY: ICD-10-CM

## 2025-01-20 DIAGNOSIS — Z98.1 S/P FUSION OF THORACIC SPINE: ICD-10-CM

## 2025-01-20 DIAGNOSIS — G89.29 CHRONIC BACK PAIN, UNSPECIFIED BACK LOCATION, UNSPECIFIED BACK PAIN LATERALITY: ICD-10-CM

## 2025-01-20 PROCEDURE — 99214 OFFICE O/P EST MOD 30 MIN: CPT | Performed by: STUDENT IN AN ORGANIZED HEALTH CARE EDUCATION/TRAINING PROGRAM

## 2025-01-20 PROCEDURE — G2211 COMPLEX E/M VISIT ADD ON: HCPCS | Performed by: STUDENT IN AN ORGANIZED HEALTH CARE EDUCATION/TRAINING PROGRAM

## 2025-01-20 RX ORDER — TRAZODONE HYDROCHLORIDE 50 MG/1
TABLET, FILM COATED ORAL
COMMUNITY
Start: 2025-01-08

## 2025-01-20 NOTE — PROGRESS NOTES
Assessment & Plan       ICD-10-CM    1. Chronic pain disorder  G89.4 Pain Management Referral - To a Non [facility] Location (Use POS/Location)      2. Depressive disorder  F32.A       3. S/P fusion of thoracic spine  Z98.1 Pain Management Referral - To a Non [facility] Location (Use POS/Location)      4. Chronic back pain, unspecified back location, unspecified back pain laterality  M54.9 Pain Management Referral - To a Non [facility] Location (Use POS/Location)    G89.29            Patient Instructions   Recommend talking with Jerry about psychologist     [facility]   [address]  [place] [address]  [phone] -- phone    Continue PT     Can bring FMLA paperwork anytime for office visit     Reasons to follow-up sooner or seek emergent care reviewed.     34 minutes spent on the date of the encounter doing chart review, history and exam, documentation and further activities per the note      Raghavendra Castano MD, [facility]      Subjective     Hong Santiago is a 34 year old male who presents to clinic today for the following health issues:    HPI   Chief Complaint   Patient presents with    Consult     Ongoing chronic back pain, had spinal fusion 2019. Goes to PT 1x/week which relieves pain due to dry needling. Wants to explore options for pain relief, wants to avoid surgery if possible.         T6-8 fusion 2019, Dr. Lopez, office visit note reviewed. Never had any improvement in sx.  PT every week long term, temporarily helpful.   Works as  for [employer].   Mental health: Jerry Palacios NP  Significant psychosocial effects of chronic pain on friendships and dating        Objective    /72 (BP Location: Left arm, Patient Position: Sitting, Cuff Size: Adult Large)   Pulse 69   Temp 97.6  F (36.4  C) (Temporal)   Wt 86.6 kg (191 lb)   SpO2 97%   BMI 24.69 kg/m    Body mass index is 24.69 kg/m .  Alert, NAD  NC/AT  Sclerae  anicteric  Resp nonlabored  Skin warm and dry  No focal neuro deficits. Speech intact. Normal gait.  Appropriate affect  Multiple thoracic surgical scars, no atrophy or scapular winging. Normal gait

## 2025-01-20 NOTE — PATIENT INSTRUCTIONS
Recommend talking with Jerry about psychologist     [facility]   [address]  [place], [place] [address] [phone] -- phone    Continue PT     Can bring FMLA paperwork anytime

## 2025-01-20 NOTE — NURSING NOTE
Chief Complaint   Patient presents with    Consult     Ongoing chronic back pain, had spinal fusion 2019. Goes to PT 1x/week which relieves pain due to dry needling. Wants to explore options for pain relief, wants to avoid surgery if possible.      Pre-visit Screening:  Immunizations:  not up to date - declined  Colonoscopy:    Mammogram:   Asthma Action Test/Plan:    PHQ9:    GAD7:    Questioned patient about current smoking habits Pt. quit smoking some time ago.  Ok to leave detailed message on voice mail for today's visit only yes, phone # [phone] (home) [phone] (work)

## 2025-01-27 ENCOUNTER — OFFICE VISIT (OUTPATIENT)
Dept: FAMILY MEDICINE | Facility: CLINIC | Age: 35
End: 2025-01-27

## 2025-01-27 VITALS
TEMPERATURE: 97.5 F | HEART RATE: 57 BPM | DIASTOLIC BLOOD PRESSURE: 68 MMHG | SYSTOLIC BLOOD PRESSURE: 110 MMHG | OXYGEN SATURATION: 98 %

## 2025-01-27 DIAGNOSIS — G89.4 CHRONIC PAIN DISORDER: ICD-10-CM

## 2025-01-27 DIAGNOSIS — G89.29 CHRONIC BACK PAIN, UNSPECIFIED BACK LOCATION, UNSPECIFIED BACK PAIN LATERALITY: ICD-10-CM

## 2025-01-27 DIAGNOSIS — Z98.1 S/P FUSION OF THORACIC SPINE: Primary | ICD-10-CM

## 2025-01-27 DIAGNOSIS — M54.9 CHRONIC BACK PAIN, UNSPECIFIED BACK LOCATION, UNSPECIFIED BACK PAIN LATERALITY: ICD-10-CM

## 2025-01-27 DIAGNOSIS — Z02.89 ENCOUNTER FOR COMPLETION OF FORM WITH PATIENT: ICD-10-CM

## 2025-01-27 PROCEDURE — G2211 COMPLEX E/M VISIT ADD ON: HCPCS | Performed by: STUDENT IN AN ORGANIZED HEALTH CARE EDUCATION/TRAINING PROGRAM

## 2025-01-27 PROCEDURE — 99213 OFFICE O/P EST LOW 20 MIN: CPT | Performed by: STUDENT IN AN ORGANIZED HEALTH CARE EDUCATION/TRAINING PROGRAM

## 2025-01-27 NOTE — NURSING NOTE
Chief Complaint   Patient presents with    Forms     FMLA for work      Pre-visit Screening:  Immunizations:  not up to date - declined  Colonoscopy:    Mammogram:   Asthma Action Test/Plan:    PHQ9:    GAD7:    Questioned patient about current smoking habits Pt. quit smoking some time ago.  Ok to leave detailed message on voice mail for today's visit only yes, phone # [phone] (home) [phone] (work)

## 2025-01-27 NOTE — PROGRESS NOTES
Assessment & Plan       ICD-10-CM    1. S/P fusion of thoracic spine  Z98.1       2. Chronic back pain, unspecified back location, unspecified back pain laterality  M54.9     G89.29       3. Chronic pain disorder  G89.4       4. Encounter for completion of form with patient  Z02.89          FMLA paperwork completed, reviewed tx plan      22 minutes spent on the date of the encounter doing chart review, history and exam, documentation and further activities per the note      Raghavendra Castano MD, [facility]      Subjective     Hong Santiago is a 34 year old male who presents to clinic today for the following health issues:    HPI   Chief Complaint   Patient presents with    Forms     FMLA for work       Phone tag with Bhupinder   [facility] paperwork, has ongoing PT once weekly, mental health, and awaiting [facility] consult.   Able to perform job duties, has understanding boss         Objective    /68 (BP Location: Right arm, Patient Position: Sitting, Cuff Size: Adult Large)   Pulse 57   Temp 97.5  F (36.4  C) (Temporal)   SpO2 98%   There is no height or weight on file to calculate BMI.  Alert, NAD  NC/AT  Sclerae anicteric  Resp nonlabored  Skin warm and dry  Speech intact. Normal gait.  Appropriate affect     Labs reviewed.

## 2025-02-17 ENCOUNTER — MYC MEDICAL ADVICE (OUTPATIENT)
Dept: FAMILY MEDICINE | Facility: CLINIC | Age: 35
End: 2025-02-17

## (undated) DEVICE — GLOVE PROTEXIS POWDER FREE 7.5 ORTHOPEDIC 2D73ET75

## (undated) DEVICE — DRSG ABDOMINAL 07 1/2X8" 7197D

## (undated) DEVICE — GLOVE PROTEXIS W/NEU-THERA 6.5  2D73TE65

## (undated) DEVICE — LINEN DRAPE 54X72" 5467

## (undated) DEVICE — BAG CLEAR TRASH 1.3M 39X33" P4040C

## (undated) DEVICE — DRAPE MAYO STAND 23X54 8337

## (undated) DEVICE — PAD FOAM WILSON FRAME/JACKSON TABLE PT KIT 5878

## (undated) DEVICE — DRAPE C-ARM 60X42" 1013

## (undated) DEVICE — POSITIONER PT PRONESAFE HEAD REST W/DERMAPROX INSERT 40599

## (undated) DEVICE — DRAPE IOBAN ISOLATION VERTICAL 6619

## (undated) DEVICE — DISC CUTTER BLADE

## (undated) DEVICE — SYR 03ML LL W/O NDL 309657

## (undated) DEVICE — SYR 10ML LL W/O NDL 302995

## (undated) DEVICE — IOM 15 MIN UP TO 7 HOURS

## (undated) DEVICE — Device

## (undated) DEVICE — ESU GROUND PAD ADULT W/CORD E7507

## (undated) DEVICE — LINEN ORTHO ACL PACK 5447

## (undated) DEVICE — DRSG STERI STRIP 1/2X4" R1547

## (undated) DEVICE — DRSG GAUZE 4X4" TRAY

## (undated) DEVICE — GLOVE PROTEXIS BLUE W/NEU-THERA 6.5  2D73EB65

## (undated) DEVICE — SU VICRYL 2-0 CT-2 CR 8X18" J726D

## (undated) DEVICE — PREP DURAPREP 26ML APL 8630

## (undated) DEVICE — DISPOSABLE MONOPOLAR PROBE

## (undated) DEVICE — SPONGE RAY-TEC 4X8" 7318

## (undated) DEVICE — BLADE KNIFE SURG 11 371111

## (undated) DEVICE — GLOVE PROTEXIS POWDER FREE SMT 8.0  2D72PT80X

## (undated) DEVICE — NDL SPINAL 18GA 3.5" 405184

## (undated) DEVICE — CATH TRAY FOLEY COUDE SURESTEP 16FR W/DRN BAG LATEX A304416A

## (undated) DEVICE — PACK SMALL SPINE RIDGES

## (undated) DEVICE — K-WIRES

## (undated) DEVICE — SYR 30ML LL W/O NDL 302832

## (undated) RX ORDER — GLYCOPYRROLATE 0.2 MG/ML
INJECTION INTRAMUSCULAR; INTRAVENOUS
Status: DISPENSED
Start: 2019-09-19

## (undated) RX ORDER — HYDROMORPHONE HYDROCHLORIDE 1 MG/ML
INJECTION, SOLUTION INTRAMUSCULAR; INTRAVENOUS; SUBCUTANEOUS
Status: DISPENSED
Start: 2019-09-19

## (undated) RX ORDER — PROPOFOL 10 MG/ML
INJECTION, EMULSION INTRAVENOUS
Status: DISPENSED
Start: 2019-09-19

## (undated) RX ORDER — ACETAMINOPHEN 325 MG/1
TABLET ORAL
Status: DISPENSED
Start: 2019-09-19

## (undated) RX ORDER — BUPIVACAINE HYDROCHLORIDE 7.5 MG/ML
INJECTION, SOLUTION EPIDURAL; RETROBULBAR
Status: DISPENSED
Start: 2019-09-19

## (undated) RX ORDER — FENTANYL CITRATE 50 UG/ML
INJECTION, SOLUTION INTRAMUSCULAR; INTRAVENOUS
Status: DISPENSED
Start: 2019-09-19

## (undated) RX ORDER — KETOROLAC TROMETHAMINE 30 MG/ML
INJECTION, SOLUTION INTRAMUSCULAR; INTRAVENOUS
Status: DISPENSED
Start: 2019-09-19

## (undated) RX ORDER — KETAMINE HCL IN 0.9 % NACL 50 MG/5 ML
SYRINGE (ML) INTRAVENOUS
Status: DISPENSED
Start: 2019-09-19

## (undated) RX ORDER — BUPIVACAINE HYDROCHLORIDE 2.5 MG/ML
INJECTION, SOLUTION EPIDURAL; INFILTRATION; INTRACAUDAL
Status: DISPENSED
Start: 2019-09-19

## (undated) RX ORDER — CEFAZOLIN SODIUM 2 G/100ML
INJECTION, SOLUTION INTRAVENOUS
Status: DISPENSED
Start: 2019-09-19

## (undated) RX ORDER — DEXAMETHASONE SODIUM PHOSPHATE 4 MG/ML
INJECTION, SOLUTION INTRA-ARTICULAR; INTRALESIONAL; INTRAMUSCULAR; INTRAVENOUS; SOFT TISSUE
Status: DISPENSED
Start: 2019-09-19

## (undated) RX ORDER — TRIAMCINOLONE ACETONIDE 40 MG/ML
INJECTION, SUSPENSION INTRA-ARTICULAR; INTRAMUSCULAR
Status: DISPENSED
Start: 2019-09-19

## (undated) RX ORDER — LIDOCAINE HYDROCHLORIDE 10 MG/ML
INJECTION, SOLUTION EPIDURAL; INFILTRATION; INTRACAUDAL; PERINEURAL
Status: DISPENSED
Start: 2019-09-19

## (undated) RX ORDER — OXYCODONE HYDROCHLORIDE 5 MG/1
TABLET ORAL
Status: DISPENSED
Start: 2019-09-19